# Patient Record
Sex: MALE | Race: WHITE
[De-identification: names, ages, dates, MRNs, and addresses within clinical notes are randomized per-mention and may not be internally consistent; named-entity substitution may affect disease eponyms.]

---

## 2017-01-23 ENCOUNTER — APPOINTMENT (OUTPATIENT)
Dept: UROLOGY | Facility: CLINIC | Age: 82
End: 2017-01-23

## 2017-01-23 VITALS — HEART RATE: 90 BPM | SYSTOLIC BLOOD PRESSURE: 112 MMHG | DIASTOLIC BLOOD PRESSURE: 68 MMHG | TEMPERATURE: 98 F

## 2017-07-17 ENCOUNTER — APPOINTMENT (OUTPATIENT)
Dept: UROLOGY | Facility: CLINIC | Age: 82
End: 2017-07-17

## 2017-07-17 VITALS
TEMPERATURE: 98.2 F | SYSTOLIC BLOOD PRESSURE: 128 MMHG | DIASTOLIC BLOOD PRESSURE: 83 MMHG | WEIGHT: 210 LBS | RESPIRATION RATE: 16 BRPM | OXYGEN SATURATION: 97 % | BODY MASS INDEX: 31.1 KG/M2 | HEART RATE: 63 BPM | HEIGHT: 69 IN

## 2017-07-17 DIAGNOSIS — C67.9 MALIGNANT NEOPLASM OF BLADDER, UNSPECIFIED: ICD-10-CM

## 2017-12-14 ENCOUNTER — INPATIENT (INPATIENT)
Facility: HOSPITAL | Age: 82
LOS: 3 days | Discharge: NURSING, REHAB OR LONG-TERM CARE | End: 2017-12-18
Attending: FAMILY MEDICINE | Admitting: FAMILY MEDICINE
Payer: MEDICARE

## 2017-12-14 VITALS
SYSTOLIC BLOOD PRESSURE: 141 MMHG | DIASTOLIC BLOOD PRESSURE: 75 MMHG | OXYGEN SATURATION: 100 % | HEIGHT: 66 IN | WEIGHT: 229.94 LBS | RESPIRATION RATE: 18 BRPM | HEART RATE: 94 BPM | TEMPERATURE: 98 F

## 2017-12-14 LAB
ALBUMIN SERPL ELPH-MCNC: 3.4 G/DL — SIGNIFICANT CHANGE UP (ref 3.3–5)
ALP SERPL-CCNC: 122 U/L — HIGH (ref 40–120)
ALT FLD-CCNC: 25 U/L — SIGNIFICANT CHANGE UP (ref 12–78)
ANION GAP SERPL CALC-SCNC: 6 MMOL/L — SIGNIFICANT CHANGE UP (ref 5–17)
APPEARANCE UR: CLEAR — SIGNIFICANT CHANGE UP
AST SERPL-CCNC: 21 U/L — SIGNIFICANT CHANGE UP (ref 15–37)
BACTERIA # UR AUTO: (no result)
BASOPHILS # BLD AUTO: 0 K/UL — SIGNIFICANT CHANGE UP (ref 0–0.2)
BASOPHILS NFR BLD AUTO: 0.5 % — SIGNIFICANT CHANGE UP (ref 0–2)
BILIRUB SERPL-MCNC: 0.8 MG/DL — SIGNIFICANT CHANGE UP (ref 0.2–1.2)
BILIRUB UR-MCNC: NEGATIVE — SIGNIFICANT CHANGE UP
BLD GP AB SCN SERPL QL: SIGNIFICANT CHANGE UP
BUN SERPL-MCNC: 39 MG/DL — HIGH (ref 7–23)
CALCIUM SERPL-MCNC: 9.1 MG/DL — SIGNIFICANT CHANGE UP (ref 8.5–10.1)
CHLORIDE SERPL-SCNC: 104 MMOL/L — SIGNIFICANT CHANGE UP (ref 96–108)
CO2 SERPL-SCNC: 32 MMOL/L — HIGH (ref 22–31)
COLOR SPEC: YELLOW — SIGNIFICANT CHANGE UP
CREAT SERPL-MCNC: 2.1 MG/DL — HIGH (ref 0.5–1.3)
DIFF PNL FLD: NEGATIVE — SIGNIFICANT CHANGE UP
EOSINOPHIL # BLD AUTO: 0.1 K/UL — SIGNIFICANT CHANGE UP (ref 0–0.5)
EOSINOPHIL NFR BLD AUTO: 1.1 % — SIGNIFICANT CHANGE UP (ref 0–6)
GLUCOSE SERPL-MCNC: 113 MG/DL — HIGH (ref 70–99)
GLUCOSE UR QL: NEGATIVE MG/DL — SIGNIFICANT CHANGE UP
HCT VFR BLD CALC: 39.8 % — SIGNIFICANT CHANGE UP (ref 39–50)
HGB BLD-MCNC: 12.5 G/DL — LOW (ref 13–17)
INR BLD: 2.69 RATIO — HIGH (ref 0.88–1.16)
KETONES UR-MCNC: NEGATIVE — SIGNIFICANT CHANGE UP
LEUKOCYTE ESTERASE UR-ACNC: (no result)
LYMPHOCYTES # BLD AUTO: 1 K/UL — SIGNIFICANT CHANGE UP (ref 1–3.3)
LYMPHOCYTES # BLD AUTO: 12.9 % — LOW (ref 13–44)
MCHC RBC-ENTMCNC: 30 PG — SIGNIFICANT CHANGE UP (ref 27–34)
MCHC RBC-ENTMCNC: 31.4 GM/DL — LOW (ref 32–36)
MCV RBC AUTO: 95.3 FL — SIGNIFICANT CHANGE UP (ref 80–100)
MONOCYTES # BLD AUTO: 0.6 K/UL — SIGNIFICANT CHANGE UP (ref 0–0.9)
MONOCYTES NFR BLD AUTO: 7.3 % — SIGNIFICANT CHANGE UP (ref 2–14)
NEUTROPHILS # BLD AUTO: 5.9 K/UL — SIGNIFICANT CHANGE UP (ref 1.8–7.4)
NEUTROPHILS NFR BLD AUTO: 78.3 % — HIGH (ref 43–77)
NITRITE UR-MCNC: NEGATIVE — SIGNIFICANT CHANGE UP
PH UR: 6 — SIGNIFICANT CHANGE UP (ref 5–8)
PLATELET # BLD AUTO: 156 K/UL — SIGNIFICANT CHANGE UP (ref 150–400)
POTASSIUM SERPL-MCNC: 4.5 MMOL/L — SIGNIFICANT CHANGE UP (ref 3.5–5.3)
POTASSIUM SERPL-SCNC: 4.5 MMOL/L — SIGNIFICANT CHANGE UP (ref 3.5–5.3)
PROT SERPL-MCNC: 6.7 GM/DL — SIGNIFICANT CHANGE UP (ref 6–8.3)
PROT UR-MCNC: NEGATIVE MG/DL — SIGNIFICANT CHANGE UP
PROTHROM AB SERPL-ACNC: 29.6 SEC — HIGH (ref 9.8–12.7)
RBC # BLD: 4.17 M/UL — LOW (ref 4.2–5.8)
RBC # FLD: 16.6 % — HIGH (ref 10.3–14.5)
RBC CASTS # UR COMP ASSIST: SIGNIFICANT CHANGE UP /HPF (ref 0–4)
SODIUM SERPL-SCNC: 142 MMOL/L — SIGNIFICANT CHANGE UP (ref 135–145)
SP GR SPEC: 1.01 — SIGNIFICANT CHANGE UP (ref 1.01–1.02)
TYPE + AB SCN PNL BLD: SIGNIFICANT CHANGE UP
UROBILINOGEN FLD QL: NEGATIVE MG/DL — SIGNIFICANT CHANGE UP
WBC # BLD: 7.6 K/UL — SIGNIFICANT CHANGE UP (ref 3.8–10.5)
WBC # FLD AUTO: 7.6 K/UL — SIGNIFICANT CHANGE UP (ref 3.8–10.5)
WBC UR QL: SIGNIFICANT CHANGE UP

## 2017-12-14 PROCEDURE — 73502 X-RAY EXAM HIP UNI 2-3 VIEWS: CPT | Mod: 26

## 2017-12-14 PROCEDURE — 73552 X-RAY EXAM OF FEMUR 2/>: CPT | Mod: 26

## 2017-12-14 PROCEDURE — 71010: CPT | Mod: 26

## 2017-12-14 PROCEDURE — 76377 3D RENDER W/INTRP POSTPROCES: CPT | Mod: 26

## 2017-12-14 PROCEDURE — 99285 EMERGENCY DEPT VISIT HI MDM: CPT

## 2017-12-14 PROCEDURE — 73700 CT LOWER EXTREMITY W/O DYE: CPT | Mod: 26,LT

## 2017-12-14 PROCEDURE — 73030 X-RAY EXAM OF SHOULDER: CPT | Mod: 26,LT

## 2017-12-14 PROCEDURE — 93010 ELECTROCARDIOGRAM REPORT: CPT

## 2017-12-14 PROCEDURE — 73562 X-RAY EXAM OF KNEE 3: CPT | Mod: 26,LT

## 2017-12-14 PROCEDURE — 70450 CT HEAD/BRAIN W/O DYE: CPT | Mod: 26

## 2017-12-14 RX ORDER — SODIUM CHLORIDE 9 MG/ML
1000 INJECTION INTRAMUSCULAR; INTRAVENOUS; SUBCUTANEOUS ONCE
Qty: 0 | Refills: 0 | Status: DISCONTINUED | OUTPATIENT
Start: 2017-12-14 | End: 2017-12-14

## 2017-12-14 RX ORDER — SODIUM CHLORIDE 9 MG/ML
500 INJECTION INTRAMUSCULAR; INTRAVENOUS; SUBCUTANEOUS ONCE
Qty: 0 | Refills: 0 | Status: COMPLETED | OUTPATIENT
Start: 2017-12-14 | End: 2017-12-14

## 2017-12-14 RX ORDER — MORPHINE SULFATE 50 MG/1
4 CAPSULE, EXTENDED RELEASE ORAL ONCE
Qty: 0 | Refills: 0 | Status: DISCONTINUED | OUTPATIENT
Start: 2017-12-14 | End: 2017-12-14

## 2017-12-14 RX ADMIN — SODIUM CHLORIDE 500 MILLILITER(S): 9 INJECTION INTRAMUSCULAR; INTRAVENOUS; SUBCUTANEOUS at 20:53

## 2017-12-14 RX ADMIN — MORPHINE SULFATE 4 MILLIGRAM(S): 50 CAPSULE, EXTENDED RELEASE ORAL at 22:45

## 2017-12-14 RX ADMIN — MORPHINE SULFATE 4 MILLIGRAM(S): 50 CAPSULE, EXTENDED RELEASE ORAL at 21:55

## 2017-12-14 NOTE — ED ADULT NURSE NOTE - OBJECTIVE STATEMENT
pt fell of bed this morning at 1:30 AM c/o of L hip pain. on assessment, L leg deformity noted. pt fell of bed this morning at 1:30 AM c/o of L hip pain. on assessment, L leg deformity noted. hx of b/l hip surgeries. denies LOC, hitting head.

## 2017-12-14 NOTE — ED PROVIDER NOTE - MEDICAL DECISION MAKING DETAILS
Head CT WNL.  Xray hip with ? periprosthetic fracture.  CT confirms nondisplaced fx.  Orthopedics consulted will follow patient as inpatient, poss surgery.  Pt now starting to complain of back back, localized mid thoracic on reexam.  Denies chest, flank, or abdominal pain.  XRs of back appear unremarkable, will scan rest of body to r/o occult fracture or other traumatic injury prior to admission given change in his exam on my reeval.  Signout to Dr. Leigh @  01:30.  Will follow up on scans and admit patient. Head CT WNL.  Xray hip with ? periprosthetic fracture.  CT confirms nondisplaced fx.  Orthopedics consulted will follow patient as inpatient, poss surgery.  Pt now starting to complain of back pain, localized mid thoracic on reexam.  Denies chest, flank, or abdominal pain.  XRs of back appear unremarkable, will scan rest of body to r/o occult fracture or other traumatic injury prior to admission given change in his exam on my reeval.  Signout to Dr. Leigh @  01:30.  Will follow up on scans and admit patient.

## 2017-12-14 NOTE — ED PROVIDER NOTE - OBJECTIVE STATEMENT
88 y/o male with PMHx of pacemaker, bilateral hip replacements presents to the ED c/o L hip pain s/p fall overnight at 1:30am. Pt states that he had been on the edge of the bed to get up to go to the bathroom and he slid downward on the carpet. Notes a prior fall a few months ago, head CT was negative. Denies head trauma, LOC. On Coumadin. Pt ambulates with walker at bedside. 86 y/o male with PMHx of pacemaker, bilateral hip replacements presents to the ED c/o L hip pain s/p fall overnight at 1:30am. Pt states that he had been on the edge of the bed to get up to go to the bathroom and he slid downward on the carpet. Notes a prior fall a few months ago, head CT was negative. Denies head trauma, LOC, or any other traumatic injuries or pain elsewhere. On Coumadin. Pt ambulates with walker at bedside.

## 2017-12-15 DIAGNOSIS — Z96.643 PRESENCE OF ARTIFICIAL HIP JOINT, BILATERAL: Chronic | ICD-10-CM

## 2017-12-15 DIAGNOSIS — Z90.49 ACQUIRED ABSENCE OF OTHER SPECIFIED PARTS OF DIGESTIVE TRACT: Chronic | ICD-10-CM

## 2017-12-15 LAB
ANION GAP SERPL CALC-SCNC: 5 MMOL/L — SIGNIFICANT CHANGE UP (ref 5–17)
BASOPHILS # BLD AUTO: 0 K/UL — SIGNIFICANT CHANGE UP (ref 0–0.2)
BASOPHILS NFR BLD AUTO: 0.5 % — SIGNIFICANT CHANGE UP (ref 0–2)
BUN SERPL-MCNC: 35 MG/DL — HIGH (ref 7–23)
CALCIUM SERPL-MCNC: 9.1 MG/DL — SIGNIFICANT CHANGE UP (ref 8.5–10.1)
CHLORIDE SERPL-SCNC: 106 MMOL/L — SIGNIFICANT CHANGE UP (ref 96–108)
CO2 SERPL-SCNC: 33 MMOL/L — HIGH (ref 22–31)
CREAT SERPL-MCNC: 1.91 MG/DL — HIGH (ref 0.5–1.3)
EOSINOPHIL # BLD AUTO: 0 K/UL — SIGNIFICANT CHANGE UP (ref 0–0.5)
EOSINOPHIL NFR BLD AUTO: 0.5 % — SIGNIFICANT CHANGE UP (ref 0–6)
GLUCOSE SERPL-MCNC: 103 MG/DL — HIGH (ref 70–99)
HCT VFR BLD CALC: 42 % — SIGNIFICANT CHANGE UP (ref 39–50)
HGB BLD-MCNC: 13.3 G/DL — SIGNIFICANT CHANGE UP (ref 13–17)
INR BLD: 2.53 RATIO — HIGH (ref 0.88–1.16)
LYMPHOCYTES # BLD AUTO: 0.9 K/UL — LOW (ref 1–3.3)
LYMPHOCYTES # BLD AUTO: 10 % — LOW (ref 13–44)
MCHC RBC-ENTMCNC: 30 PG — SIGNIFICANT CHANGE UP (ref 27–34)
MCHC RBC-ENTMCNC: 31.6 GM/DL — LOW (ref 32–36)
MCV RBC AUTO: 94.9 FL — SIGNIFICANT CHANGE UP (ref 80–100)
MONOCYTES # BLD AUTO: 0.6 K/UL — SIGNIFICANT CHANGE UP (ref 0–0.9)
MONOCYTES NFR BLD AUTO: 6.6 % — SIGNIFICANT CHANGE UP (ref 2–14)
NEUTROPHILS # BLD AUTO: 7.2 K/UL — SIGNIFICANT CHANGE UP (ref 1.8–7.4)
NEUTROPHILS NFR BLD AUTO: 82.4 % — HIGH (ref 43–77)
PLATELET # BLD AUTO: 138 K/UL — LOW (ref 150–400)
POTASSIUM SERPL-MCNC: 4.2 MMOL/L — SIGNIFICANT CHANGE UP (ref 3.5–5.3)
POTASSIUM SERPL-SCNC: 4.2 MMOL/L — SIGNIFICANT CHANGE UP (ref 3.5–5.3)
PROTHROM AB SERPL-ACNC: 27.9 SEC — HIGH (ref 9.8–12.7)
RBC # BLD: 4.43 M/UL — SIGNIFICANT CHANGE UP (ref 4.2–5.8)
RBC # FLD: 16.5 % — HIGH (ref 10.3–14.5)
SODIUM SERPL-SCNC: 144 MMOL/L — SIGNIFICANT CHANGE UP (ref 135–145)
WBC # BLD: 8.7 K/UL — SIGNIFICANT CHANGE UP (ref 3.8–10.5)
WBC # FLD AUTO: 8.7 K/UL — SIGNIFICANT CHANGE UP (ref 3.8–10.5)

## 2017-12-15 PROCEDURE — 74176 CT ABD & PELVIS W/O CONTRAST: CPT | Mod: 26

## 2017-12-15 PROCEDURE — 73502 X-RAY EXAM HIP UNI 2-3 VIEWS: CPT | Mod: 26

## 2017-12-15 PROCEDURE — 72100 X-RAY EXAM L-S SPINE 2/3 VWS: CPT | Mod: 26

## 2017-12-15 PROCEDURE — 71250 CT THORAX DX C-: CPT | Mod: 26

## 2017-12-15 PROCEDURE — 72070 X-RAY EXAM THORAC SPINE 2VWS: CPT | Mod: 26

## 2017-12-15 RX ORDER — ACETAMINOPHEN 500 MG
650 TABLET ORAL EVERY 6 HOURS
Qty: 0 | Refills: 0 | Status: DISCONTINUED | OUTPATIENT
Start: 2017-12-15 | End: 2017-12-18

## 2017-12-15 RX ORDER — FOLIC ACID 0.8 MG
1 TABLET ORAL DAILY
Qty: 0 | Refills: 0 | Status: DISCONTINUED | OUTPATIENT
Start: 2017-12-15 | End: 2017-12-18

## 2017-12-15 RX ORDER — MORPHINE SULFATE 50 MG/1
4 CAPSULE, EXTENDED RELEASE ORAL ONCE
Qty: 0 | Refills: 0 | Status: DISCONTINUED | OUTPATIENT
Start: 2017-12-15 | End: 2017-12-15

## 2017-12-15 RX ORDER — AMIODARONE HYDROCHLORIDE 400 MG/1
200 TABLET ORAL DAILY
Qty: 0 | Refills: 0 | Status: DISCONTINUED | OUTPATIENT
Start: 2017-12-15 | End: 2017-12-18

## 2017-12-15 RX ORDER — ATORVASTATIN CALCIUM 80 MG/1
20 TABLET, FILM COATED ORAL AT BEDTIME
Qty: 0 | Refills: 0 | Status: DISCONTINUED | OUTPATIENT
Start: 2017-12-15 | End: 2017-12-18

## 2017-12-15 RX ORDER — WARFARIN SODIUM 2.5 MG/1
2 TABLET ORAL EVERY OTHER DAY
Qty: 0 | Refills: 0 | Status: DISCONTINUED | OUTPATIENT
Start: 2017-12-16 | End: 2017-12-18

## 2017-12-15 RX ORDER — WARFARIN SODIUM 2.5 MG/1
1 TABLET ORAL
Qty: 0 | Refills: 0 | COMMUNITY

## 2017-12-15 RX ORDER — WARFARIN SODIUM 2.5 MG/1
1 TABLET ORAL EVERY OTHER DAY
Qty: 0 | Refills: 0 | Status: COMPLETED | OUTPATIENT
Start: 2017-12-15 | End: 2017-12-17

## 2017-12-15 RX ORDER — METOPROLOL TARTRATE 50 MG
25 TABLET ORAL
Qty: 0 | Refills: 0 | Status: DISCONTINUED | OUTPATIENT
Start: 2017-12-15 | End: 2017-12-18

## 2017-12-15 RX ORDER — ALLOPURINOL 300 MG
300 TABLET ORAL DAILY
Qty: 0 | Refills: 0 | Status: DISCONTINUED | OUTPATIENT
Start: 2017-12-15 | End: 2017-12-18

## 2017-12-15 RX ORDER — WARFARIN SODIUM 2.5 MG/1
0 TABLET ORAL
Qty: 0 | Refills: 0 | COMMUNITY

## 2017-12-15 RX ORDER — FUROSEMIDE 40 MG
40 TABLET ORAL
Qty: 0 | Refills: 0 | Status: DISCONTINUED | OUTPATIENT
Start: 2017-12-15 | End: 2017-12-15

## 2017-12-15 RX ORDER — LEVOTHYROXINE SODIUM 125 MCG
100 TABLET ORAL DAILY
Qty: 0 | Refills: 0 | Status: DISCONTINUED | OUTPATIENT
Start: 2017-12-15 | End: 2017-12-18

## 2017-12-15 RX ORDER — WARFARIN SODIUM 2.5 MG/1
1 TABLET ORAL EVERY OTHER DAY
Qty: 0 | Refills: 0 | Status: DISCONTINUED | OUTPATIENT
Start: 2017-12-15 | End: 2017-12-15

## 2017-12-15 RX ADMIN — MORPHINE SULFATE 4 MILLIGRAM(S): 50 CAPSULE, EXTENDED RELEASE ORAL at 01:15

## 2017-12-15 RX ADMIN — MORPHINE SULFATE 4 MILLIGRAM(S): 50 CAPSULE, EXTENDED RELEASE ORAL at 09:28

## 2017-12-15 RX ADMIN — Medication 1 MILLIGRAM(S): at 11:25

## 2017-12-15 RX ADMIN — Medication 650 MILLIGRAM(S): at 11:25

## 2017-12-15 RX ADMIN — ATORVASTATIN CALCIUM 20 MILLIGRAM(S): 80 TABLET, FILM COATED ORAL at 21:58

## 2017-12-15 RX ADMIN — MORPHINE SULFATE 4 MILLIGRAM(S): 50 CAPSULE, EXTENDED RELEASE ORAL at 06:31

## 2017-12-15 RX ADMIN — Medication 25 MILLIGRAM(S): at 17:24

## 2017-12-15 RX ADMIN — MORPHINE SULFATE 4 MILLIGRAM(S): 50 CAPSULE, EXTENDED RELEASE ORAL at 00:35

## 2017-12-15 RX ADMIN — Medication 300 MILLIGRAM(S): at 11:24

## 2017-12-15 RX ADMIN — WARFARIN SODIUM 1 MILLIGRAM(S): 2.5 TABLET ORAL at 21:58

## 2017-12-15 NOTE — H&P ADULT - ASSESSMENT
A/P  # Left hip pain/s/p mechanical fall R/O left hip periprosthetic fx  - admit to med surg  -pain management  -NWB LLE  - ortho consult appreciated   -f/u ortho regarding if patient needs surgical intervention    #Pre op eval  Patient has high  cardiac risk for intermediate risk surgery based on RCRI criteria (CKD -cr >2)  Evaluation of METS limited due to patients chronic gait disorder  if ortho plans for OR ,would consult cardiology for preop clearance  and for PPM interrogation prior to OR    # hx of Afib [id number] 4 with PPM  -INR therapeutic, continue coumadin,continue BB, continue amiodarone ,lipitor  -if plan for OR would need coumadin reversal and cardio consult to recommend need for bridging with Iv heparin     # CKD stage , hx of Left renal cyst  - followed as outpatient with Dr hardin(uro) and nephrology ([facility]-patient does not know name)  -continue allopurinol    #hx Chronic venous insufficiency  continue lasix   # DVT prophylaxis- on coumadin A/P  # Left hip pain/s/p mechanical fall R/O left hip periprosthetic fx  - admit to med surg  -pain management  -NWB LLE  - ortho consult appreciated   -f/u ortho regarding if patient needs surgical intervention  -PT    #Pre op eval  Patient has high  cardiac risk for intermediate risk surgery based on RCRI criteria (CKD -cr >2)  Evaluation of METS limited due to patients chronic gait disorder  if ortho plans for OR and patient agrees ,would consult cardiology for preop clearance  and for PPM interrogation prior to OR    # hx of Afib [id number] 4 with PPM  -INR therapeutic, continue coumadin,continue BB, continue amiodarone ,lipitor  -if plan for OR would need coumadin reversal and cardio consult to recommend need for bridging with Iv heparin     # CKD stage 4 (no prior labs to comapare with) , hx of Left renal cyst  - followed as outpatient with Dr hardin(uro) and nephrology ([facility]-patient does not know name)  -continue allopurinol  monitor BMP s/p IVF received in ED    #hx Chronic venous insufficiency  continue lasix     # DVT prophylaxis- on coumadin A/P  # Left hip pain/s/p mechanical fall R/O left hip periprosthetic fx  - admit to med surg  -pain management  -NWB LLE  - ortho consult appreciated   -f/u ortho regarding if patient needs surgical intervention  -PT    #Pre op eval  Patient has high  cardiac risk for intermediate risk surgery based on RCRI criteria (CKD -cr >2)  Evaluation of METS limited due to patients chronic gait disorder  if ortho plans for OR and patient agrees ,would consult cardiology for preop clearance  and for PPM interrogation prior to OR    # hx of Afib [id number] 4 with PPM  -INR therapeutic, continue coumadin,continue BB, continue amiodarone ,lipitor  -if plan for OR would need coumadin reversal and cardio consult to recommend need for bridging with Iv heparin     # CKD stage 4 vs superimposed FRANCINE (no prior labs to compare with) , hx of Left renal cyst  - followed as outpatient with Dr hardin(uro) and nephrology ([facility]-patient does not know name)  -continue allopurinol  monitor BMP s/p IVF received in ED    #hx Chronic venous insufficiency   ,need to get prior lab record from PMD to compare renal function   if renal function at baseline continue lasix     # DVT prophylaxis- on coumadin

## 2017-12-15 NOTE — PROGRESS NOTE ADULT - ASSESSMENT
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

## 2017-12-15 NOTE — CONSULT NOTE ADULT - SUBJECTIVE AND OBJECTIVE BOX
ortho Paged at [id number], examined patient by [id number]    87y Male ambulates with walker and wheelchair at baseline presents c/o L hip pain s/p fall and slide out of bed.  Patient states he felt thigh pain and slid down, was able to ambulate after the fall to the living room.  Since then he has been unable to ambulate, but he can bear weight, but it is painful. Denies HS/LOC. Denies numbness/tingling. Denies fever/chills. Denies pain/injury elsewhere.   Patient has B/L DARIO done over 20 years ago.  He states he has had chronic Left thigh pain for years and has not seen orthopedic surgeon to get evaluation.         PAST MEDICAL & SURGICAL HISTORY:  HTN  Pacemaker  Afib on Coumadin  Venous Stasis  PVD  CAD     Allergies    penicillins (Unknown)    Intolerances  None                            12.5   7.6   )-----------( 156      ( 14 Dec 2017 19:26 )             39.8     14 Dec 2017 19:26    142    |  104    |  39     ----------------------------<  113    4.5     |  32     |  2.10     Ca    9.1        14 Dec 2017 19:26    TPro  6.7    /  Alb  3.4    /  TBili  0.8    /  DBili  x      /  AST  21     /  ALT  25     /  AlkPhos  122    14 Dec 2017 19:26    PT/INR - ( 14 Dec 2017 19:26 )   PT: 29.6 sec;   INR: 2.69 ratio           Vital Signs Last 24 Hrs  HR: 94 (12-14-17 @ 18:10) (94 - 94)  BP: 141/75 (12-14-17 @ 18:10) (141/75 - 141/75)  RR: 18 (12-14-17 @ 18:10) (18 - 18)  SpO2: 100% (12-14-17 @ 18:10) (100% - 100%)    Imaging: resident report; XR Hip/Pelvis demonstrate B/L DARIO, L DARIO demonstrates chronic appearing subsidence of prosthesis with bone formation along lateral cortex.- official read pending    Physical Exam  General: NAD, Alert, Awake and oriented  Neurologic: No gross deficits, moving all 4s.    LEFT LE:  No open skin. Chronic venous stasis of B/L LE. No deformities or other signs of trauma at hip, knee, lower leg, ankle or foot.  Patient has chronic leg length discrepancy, R>L. TTP along lateral cortex. Full baseline painless Knee, ankle and toes. Moderate pain with passive ROM of hip, pain located to groin and lateral thigh with IR/ER, no pain with log roll.  Patient unable to SLR, but patient able to hold LLE elevated with assistance with minimal pain. SILT toes 1-5.  DP/PT weak but intact with brisk cap refill distally. Compartments soft and compressible. EHL/FHL/TA/GS intact 5/5.    Secondary Survey: No bony TTP along prominences, full painless ROM throughout, able to SLR RLE, SILT, compartments soft, grossly benign.
Patient is a 87y old  Male who presents with a chief complaint of left hip pain s/p fall.      HPI:  86 y/o male with PMHx of pacemaker,Afib , hx of renal cyst follows with dr hardin outpatient and abnormal kidney function follows with outpatient nephrology ,bilateral hip replacements presents to the ED c/o L hip pain s/p fall overnight at 1:30am 17. Pt states that he had been on the edge of the bed to get up to go to the bathroom and he slid downward on the carpet. Notes a prior fall a few months ago, head CT was negative. Denies head trauma, LOC, or any other traumatic injuries or pain elsewhere. On Coumadin.   Pt ambulates with walker at home.  Pt was noted to have a prosthetic hip fracture on left side and cardiology consulted for preop evaluation.  Pt denies any CP or SOB or synocpe.      pmhx/pshx- afib/PPM on coumadin, chronic venous insufficiency s/p right lower extremity vein surgery3 years ago, HTN,hypothyroidism, cholecystectomy ,B/l hip replacements,hyperlipidemia, ? CKD, hx of renal cyst per patient followed every 3 months by dr hardin   social- quit smoking 10 yrs ago, occasional etoh use  ,walks mainly with walker and sometimes uses wheel chair  family hx- non contributory (15 Dec 2017 06:27)      PAST MEDICAL & SURGICAL HISTORY:  PVD (peripheral vascular disease)      MEDICATIONS  (STANDING):  allopurinol 300 milliGRAM(s) Oral daily  amiodarone    Tablet 200 milliGRAM(s) Oral daily  atorvastatin 20 milliGRAM(s) Oral at bedtime  folic acid 1 milliGRAM(s) Oral daily  levothyroxine 100 MICROGram(s) Oral daily  metoprolol     tartrate 25 milliGRAM(s) Oral two times a day  warfarin 1 milliGRAM(s) Oral every other day    MEDICATIONS  (PRN):  acetaminophen   Tablet 650 milliGRAM(s) Oral every 6 hours PRN moderate pain      FAMILY HISTORY:  No family history of premature CAD or SCD.       SOCIAL HISTORY:  non smoker, no alcohol use     REVIEW OF SYSTEMS:  CONSTITUTIONAL:  No night sweats.  No fatigue, malaise, lethargy.  No fever or chills.  HEENT:  Eyes:  No visual changes.  No eye pain.      ENT:  No runny nose.  No epistaxis.  No sinus pain.  No sore throat.  No odynophagia.  No ear pain.  No congestion.  RESPIRATORY:  No cough.  No wheeze.  No hemoptysis.  No shortness of breath.  CARDIOVASCULAR:  No chest pains.  No palpitations. No shortness of breath, No orthopnea or PND.  GASTROINTESTINAL:  No abdominal pain.  No nausea or vomiting.  No diarrhea or constipation.  No hematemesis.  No hematochezia.  No melena.  GENITOURINARY:  No urgency.  No frequency.  No dysuria.  No hematuria.  No obstructive symptoms.  No discharge.  No pain.  No significant abnormal bleeding.  MUSCULOSKELETAL:  L hip pain  NEUROLOGICAL:  No tingling or numbness or weakness.  PSYCHIATRIC:  No confusion  SKIN:  No rashes.  No lesions.  No wounds.  ENDOCRINE:  No unexplained weight loss.  No polydipsia.  No polyuria.  No polyphagia.  HEMATOLOGIC:  No anemia.  No purpura.  No petechiae.  No prolonged or excessive bleeding.   ALLERGIC AND IMMUNOLOGIC:  No pruritus.  No swelling.         Vital Signs Last 24 Hrs  T(C): 36.6 (15 Dec 2017 09:16), Max: 36.8 (15 Dec 2017 08:29)  T(F): 97.8 (15 Dec 2017 09:16), Max: 98.3 (15 Dec 2017 08:29)  HR: 86 (15 Dec 2017 09:16) (62 - 94)  BP: 120/60 (15 Dec 2017 09:16) (107/64 - 151/69)  BP(mean): --  RR: 15 (15 Dec 2017 09:16) (15 - 18)  SpO2: 100% (15 Dec 2017 09:16) (97% - 100%)    PHYSICAL EXAM-    Constitutional: The patient appears to be normal, well developed, well nourished and alert and oriented to time, place and person. The patient does not appear acutely ill.     Head: Head is normocephalic and atraumatic.      Neck: The patient's neck is supple without enlargement, has no palpable thyromegaly nor thyroid nodules and has no jugular venous distention. No audible carotid bruits. There are strong carotid pulses bilaterally. No JVD.     Cardiovascular: Regular rate and rhythm without S3, S4. No murmurs or rubs are appreciated.      Respiratory: Breathsounds are normal. No rales. No wheezing.    Abdomen: Soft, nontender, nondistended with positive bowel sounds.      Extremity: No tenderness. No  pitting edema No skin discoloration No clubbing No cyanosis.     Neurologic: The patient is alert and oriented.      Skin: No rash, no obvious lesions noted.      Psychiatric: The patient appears to be emotionally stable.      INTERPRETATION OF TELEMETRY: not on     ECG: A paced rythm    I&O's Detail    14 Dec 2017 07:01  -  15 Dec 2017 07:00  --------------------------------------------------------  IN:  Total IN: 0 mL    OUT:    Voided: 750 mL  Total OUT: 750 mL    Total NET: -750 mL      15 Dec 2017 07:01  -  15 Dec 2017 15:30  --------------------------------------------------------  IN:    Oral Fluid: 240 mL  Total IN: 240 mL    OUT:    Voided: 200 mL  Total OUT: 200 mL    Total NET: 40 mL          LABS:                        13.3   8.7   )-----------( 138      ( 15 Dec 2017 12:45 )             42.0     1215    144  |  106  |  35<H>  ----------------------------<  103<H>  4.2   |  33<H>  |  1.91<H>    Ca    9.1      15 Dec 2017 12:45    TPro  6.7  /  Alb  3.4  /  TBili  0.8  /  DBili  x   /  AST  21  /  ALT  25  /  AlkPhos  122<H>  12-14        PT/INR - ( 15 Dec 2017 12:45 )   PT: 27.9 sec;   INR: 2.53 ratio           Urinalysis Basic - ( 14 Dec 2017 20:10 )    Color: Yellow / Appearance: Clear / S.010 / pH: x  Gluc: x / Ketone: Negative  / Bili: Negative / Urobili: Negative mg/dL   Blood: x / Protein: Negative mg/dL / Nitrite: Negative   Leuk Esterase: Trace / RBC: 0-2 /HPF / WBC 0-2   Sq Epi: x / Non Sq Epi: x / Bacteria: Occasional      I&O's Summary    14 Dec 2017 07:01  -  15 Dec 2017 07:00  --------------------------------------------------------  IN: 0 mL / OUT: 750 mL / NET: -750 mL    15 Dec 2017 07:  -  15 Dec 2017 15:30  --------------------------------------------------------  IN: 240 mL / OUT: 200 mL / NET: 40 mL      BNP  RADIOLOGY & ADDITIONAL STUDIES:  < from: CT Lower Extremity No Cont, Left (12.14.17 @ 22:56) >    EXAM:  CT LWR EXT LT                          [facility]                            PROCEDURE DATE:  2017          INTERPRETATION:  Clinical indication: Rule out left hip periprosthetic   fracture..    Technique: CT axial images of the left hip and femur were obtained   without intravenous contrast. Coronal and sagittal reformatted images   were also obtained. 3-D volume rendering images were obtained from a   separate workstation  .    Comparison: Earlier radiographs of the same date.     Findings:    Bones: Severe metallic artifact limits the evaluation around the hip   joint. Osteopenia. There is separation of the metallic and nonmetallic   components of the femoral stem causing endosteal erosion to the femur   medially and laterally (604-72, 3-109). There is outward buckling of the   overlying cortex but there is no acute definite cortical disruption this   level. About 4 cm above this cortical buckling, there is a thin cortical   disruption only seenon the coronal plane. Volume rendering images   demonstrate an oblique subtle fracture (500-6 through 13) .  Acetabular   and proximal femoral component are unremarkable.    Soft tissue: No hematoma in the visualized soft tissue. No significant   hipjoint effusion. Atrophic left femur and hip musculature..        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    Nondisplaced hairline fracture of the proximal femur 4 cm above the   cortical buckling.    < end of copied text >

## 2017-12-15 NOTE — H&P ADULT - NSHPLABSRESULTS_GEN_ALL_CORE
12.5   7.6   )-----------( 156      ( 14 Dec 2017 19:26 )             39.8       CBC Full  -  ( 14 Dec 2017 19:26 )  WBC Count : 7.6 K/uL  Hemoglobin : 12.5 g/dL  Hematocrit : 39.8 %  Platelet Count - Automated : 156 K/uL  Mean Cell Volume : 95.3 fl  Mean Cell Hemoglobin : 30.0 pg  Mean Cell Hemoglobin Concentration : 31.4 gm/dL  Auto Neutrophil # : 5.9 K/uL  Auto Lymphocyte # : 1.0 K/uL  Auto Monocyte # : 0.6 K/uL  Auto Eosinophil # : 0.1 K/uL  Auto Basophil # : 0.0 K/uL  Auto Neutrophil % : 78.3 %  Auto Lymphocyte % : 12.9 %  Auto Monocyte % : 7.3 %  Auto Eosinophil % : 1.1 %  Auto Basophil % : 0.5 %          142  |  104  |  39<H>  ----------------------------<  113<H>  4.5   |  32<H>  |  2.10<H>    Ca    9.1      14 Dec 2017 19:26    TPro  6.7  /  Alb  3.4  /  TBili  0.8  /  DBili  x   /  AST  21  /  ALT  25  /  AlkPhos  122<H>        LIVER FUNCTIONS - ( 14 Dec 2017 19:26 )  Alb: 3.4 g/dL / Pro: 6.7 gm/dL / ALK PHOS: 122 U/L / ALT: 25 U/L / AST: 21 U/L / GGT: x             PT/INR - ( 14 Dec 2017 19:26 )   PT: 29.6 sec;   INR: 2.69 ratio                   Urinalysis Basic - ( 14 Dec 2017 20:10 )    Color: Yellow / Appearance: Clear / S.010 / pH: x  Gluc: x / Ketone: Negative  / Bili: Negative / Urobili: Negative mg/dL   Blood: x / Protein: Negative mg/dL / Nitrite: Negative   Leuk Esterase: Trace / RBC: 0-2 /HPF / WBC 0-2   Sq Epi: x / Non Sq Epi: x / Bacteria: Occasional            MEDICATIONS  (STANDING):  allopurinol 300 milliGRAM(s) Oral daily  amiodarone    Tablet 200 milliGRAM(s) Oral daily  atorvastatin 20 milliGRAM(s) Oral at bedtime  folic acid 1 milliGRAM(s) Oral daily  furosemide    Tablet 40 milliGRAM(s) Oral two times a day  levothyroxine 100 MICROGram(s) Oral daily  metoprolol     tartrate 25 milliGRAM(s) Oral two times a day

## 2017-12-15 NOTE — CHART NOTE - NSCHARTNOTEFT_GEN_A_CORE
discussed case further with Dr. cleary  considering operative mgmt for periprosthetic hip fx  will discuss with patient and family further  possible OR tomorrow pending medical optimization/clearance  npo except meds after midnight  ic fluids while npo  hold chemical AC after midnight  will advise if plan changes further

## 2017-12-15 NOTE — H&P ADULT - NSHPPHYSICALEXAM_GEN_ALL_CORE
PHYSICAL EXAM:    Daily Height in cm: 167.64 (14 Dec 2017 18:10)    Daily     ICU Vital Signs Last 24 Hrs  T(C): 36.4 (15 Dec 2017 05:07), Max: 36.7 (14 Dec 2017 18:10)  T(F): 97.6 (15 Dec 2017 05:07), Max: 98.1 (14 Dec 2017 18:10)  HR: 64 (15 Dec 2017 05:07) (62 - 94)  BP: 133/92 (15 Dec 2017 05:07) (107/64 - 141/75)  BP(mean): --  ABP: --  ABP(mean): --  RR: 17 (15 Dec 2017 05:07) (17 - 18)  SpO2: 100% (15 Dec 2017 05:07) (100% - 100%)      Constitutional: NAD,pleasant,  HEENT: Atraumatic, PAT, Normal, No congestion  Respiratory: Breath Sounds normal, no rhonchi/wheeze  Cardiovascular: N S1S2; LAUREN present  Gastrointestinal: Abdomen soft, non tender, obese Bowel Ssounds present  Extremities: B/l lower ext edema 2+,   Neurological: awake ,alert oriented x3, no gross focal motor deficits except chronic RLE weakness (unable to passively elevate RLE above bed  which is chronic as per patient), left hip tenderness ,no edema, unable to move left hip due to pain on movement  Skin: Non cellulitic,   Lymph Nodes: No lymphadenopathy noted  Back: No CVA tenderness   Musculoskeletal: non tender  Breasts: Deferred  Genitourinary: deferred  Rectal: Deferred

## 2017-12-15 NOTE — H&P ADULT - HISTORY OF PRESENT ILLNESS
88 y/o male with PMHx of pacemaker,Afib , hx of renal cyst follows with dr hardin outpatient and abnormal kidney function follows with outpatient nephrology ,bilateral hip replacements presents to the ED c/o L hip pain s/p fall overnight at 1:30am 12/14/17. Pt states that he had been on the edge of the bed to get up to go to the bathroom and he slid downward on the carpet. Notes a prior fall a few months ago, head CT was negative. Denies head trauma, LOC, or any other traumatic injuries or pain elsewhere. On Coumadin. Pt ambulates with walker at home  trauma w/u done in ED head CT,Ct chest and Ct abd/pelvis-large left kidney cyst exophytic renal cyst vs retroperitoneal ,sigmoid diverticulosis without diverticulitis,left apical scarring/fibrosis  patient seen by ortho in Ed- possibility of left hip periprosthetic fx   INR 2.69 on coumadin  EKG paced rhythm     pmhx/pshx- afib/PPM on coumadin, chronic venous insufficiency s/p right lower extremity vein surgery3 years ago, HTN,hypothyroidism, cholecystectomy ,B/l hip replacements,hyperlipidemia, ? CKD, hx of renal cyst per patient followed every 3 months by dr hardin   social- quit smoking 10 yrs ago, occasional etoh use  ,walks mainly with walker and sometimes uses wheel chair  family hx- non contributory

## 2017-12-15 NOTE — PROGRESS NOTE ADULT - SUBJECTIVE AND OBJECTIVE BOX
Pt seen and examined. Pain controlled. No acute events overnight.  Denies any Fever/CHills/CP/SOB.  Patient complains of mild R hip pain this AM    Vital Signs Last 24 Hrs  T(C): 36.7 (14 Dec 2017 18:10), Max: 36.7 (14 Dec 2017 18:10)  T(F): 98.1 (14 Dec 2017 18:10), Max: 98.1 (14 Dec 2017 18:10)  HR: 64 (15 Dec 2017 05:07) (62 - 94)  BP: 133/92 (15 Dec 2017 05:07) (107/64 - 141/75)  RR: 18 (14 Dec 2017 18:10) (18 - 18)  SpO2: 100% (14 Dec 2017 18:10) (100% - 100%)    Gen: NAD  LLE:  Skin intact  Pain with ROM of L hip, unable to SLR L hip, Positive log roll L hip  +sensation L2-S1  +dorsiflexion/plantarflexion of ankle/hallux  +dorsalis pedis pulse, weak but palpable   Soft compartments, - calf tenderness Pt seen and examined. Pain controlled. No acute events overnight.  Denies any Fever/CHills/CP/SOB.  Patient complains of mild R hip pain this AM    Vital Signs Last 24 Hrs  T(C): 36.7 (14 Dec 2017 18:10), Max: 36.7 (14 Dec 2017 18:10)  T(F): 98.1 (14 Dec 2017 18:10), Max: 98.1 (14 Dec 2017 18:10)  HR: 64 (15 Dec 2017 05:07) (62 - 94)  BP: 133/92 (15 Dec 2017 05:07) (107/64 - 141/75)  RR: 18 (14 Dec 2017 18:10) (18 - 18)  SpO2: 100% (14 Dec 2017 18:10) (100% - 100%)    Gen: NAD  LLE:  Skin intact  Pain with ROM of L hip, unable to SLR L hip, neg log roll L hip  +sensation L2-S1  +dorsiflexion/plantarflexion of ankle/hallux  +dorsalis pedis pulse, weak but palpable   Soft compartments, - calf tenderness

## 2017-12-16 LAB
ALBUMIN SERPL ELPH-MCNC: 3 G/DL — LOW (ref 3.3–5)
ALP SERPL-CCNC: 225 U/L — HIGH (ref 40–120)
ALT FLD-CCNC: 219 U/L — HIGH (ref 12–78)
ANION GAP SERPL CALC-SCNC: 8 MMOL/L — SIGNIFICANT CHANGE UP (ref 5–17)
APTT BLD: 44.9 SEC — HIGH (ref 27.5–37.4)
AST SERPL-CCNC: 160 U/L — HIGH (ref 15–37)
BASOPHILS # BLD AUTO: 0 K/UL — SIGNIFICANT CHANGE UP (ref 0–0.2)
BASOPHILS NFR BLD AUTO: 0.7 % — SIGNIFICANT CHANGE UP (ref 0–2)
BILIRUB SERPL-MCNC: 0.9 MG/DL — SIGNIFICANT CHANGE UP (ref 0.2–1.2)
BUN SERPL-MCNC: 35 MG/DL — HIGH (ref 7–23)
CALCIUM SERPL-MCNC: 8.9 MG/DL — SIGNIFICANT CHANGE UP (ref 8.5–10.1)
CHLORIDE SERPL-SCNC: 106 MMOL/L — SIGNIFICANT CHANGE UP (ref 96–108)
CO2 SERPL-SCNC: 30 MMOL/L — SIGNIFICANT CHANGE UP (ref 22–31)
CREAT SERPL-MCNC: 1.77 MG/DL — HIGH (ref 0.5–1.3)
EOSINOPHIL # BLD AUTO: 0.2 K/UL — SIGNIFICANT CHANGE UP (ref 0–0.5)
EOSINOPHIL NFR BLD AUTO: 2.6 % — SIGNIFICANT CHANGE UP (ref 0–6)
GLUCOSE SERPL-MCNC: 88 MG/DL — SIGNIFICANT CHANGE UP (ref 70–99)
HCT VFR BLD CALC: 38.8 % — LOW (ref 39–50)
HGB BLD-MCNC: 12.2 G/DL — LOW (ref 13–17)
INR BLD: 2.93 RATIO — HIGH (ref 0.88–1.16)
LYMPHOCYTES # BLD AUTO: 1.1 K/UL — SIGNIFICANT CHANGE UP (ref 1–3.3)
LYMPHOCYTES # BLD AUTO: 15.1 % — SIGNIFICANT CHANGE UP (ref 13–44)
MAGNESIUM SERPL-MCNC: 2.2 MG/DL — SIGNIFICANT CHANGE UP (ref 1.6–2.6)
MCHC RBC-ENTMCNC: 30 PG — SIGNIFICANT CHANGE UP (ref 27–34)
MCHC RBC-ENTMCNC: 31.4 GM/DL — LOW (ref 32–36)
MCV RBC AUTO: 95.6 FL — SIGNIFICANT CHANGE UP (ref 80–100)
MONOCYTES # BLD AUTO: 0.5 K/UL — SIGNIFICANT CHANGE UP (ref 0–0.9)
MONOCYTES NFR BLD AUTO: 7.5 % — SIGNIFICANT CHANGE UP (ref 2–14)
NEUTROPHILS # BLD AUTO: 5.3 K/UL — SIGNIFICANT CHANGE UP (ref 1.8–7.4)
NEUTROPHILS NFR BLD AUTO: 74.1 % — SIGNIFICANT CHANGE UP (ref 43–77)
PHOSPHATE SERPL-MCNC: 2.5 MG/DL — SIGNIFICANT CHANGE UP (ref 2.5–4.5)
PLATELET # BLD AUTO: 149 K/UL — LOW (ref 150–400)
POTASSIUM SERPL-MCNC: 3.8 MMOL/L — SIGNIFICANT CHANGE UP (ref 3.5–5.3)
POTASSIUM SERPL-SCNC: 3.8 MMOL/L — SIGNIFICANT CHANGE UP (ref 3.5–5.3)
PROT SERPL-MCNC: 6.3 GM/DL — SIGNIFICANT CHANGE UP (ref 6–8.3)
PROTHROM AB SERPL-ACNC: 32.4 SEC — HIGH (ref 9.8–12.7)
RBC # BLD: 4.06 M/UL — LOW (ref 4.2–5.8)
RBC # FLD: 16.3 % — HIGH (ref 10.3–14.5)
SODIUM SERPL-SCNC: 144 MMOL/L — SIGNIFICANT CHANGE UP (ref 135–145)
WBC # BLD: 7.2 K/UL — SIGNIFICANT CHANGE UP (ref 3.8–10.5)
WBC # FLD AUTO: 7.2 K/UL — SIGNIFICANT CHANGE UP (ref 3.8–10.5)

## 2017-12-16 RX ORDER — DOCUSATE SODIUM 100 MG
100 CAPSULE ORAL THREE TIMES A DAY
Qty: 0 | Refills: 0 | Status: DISCONTINUED | OUTPATIENT
Start: 2017-12-16 | End: 2017-12-18

## 2017-12-16 RX ORDER — FUROSEMIDE 40 MG
40 TABLET ORAL
Qty: 0 | Refills: 0 | Status: DISCONTINUED | OUTPATIENT
Start: 2017-12-16 | End: 2017-12-18

## 2017-12-16 RX ORDER — POLYETHYLENE GLYCOL 3350 17 G/17G
17 POWDER, FOR SOLUTION ORAL DAILY
Qty: 0 | Refills: 0 | Status: DISCONTINUED | OUTPATIENT
Start: 2017-12-16 | End: 2017-12-18

## 2017-12-16 RX ORDER — SENNA PLUS 8.6 MG/1
2 TABLET ORAL AT BEDTIME
Qty: 0 | Refills: 0 | Status: DISCONTINUED | OUTPATIENT
Start: 2017-12-16 | End: 2017-12-18

## 2017-12-16 RX ORDER — POTASSIUM CHLORIDE 20 MEQ
10 PACKET (EA) ORAL
Qty: 0 | Refills: 0 | Status: DISCONTINUED | OUTPATIENT
Start: 2017-12-16 | End: 2017-12-18

## 2017-12-16 RX ADMIN — AMIODARONE HYDROCHLORIDE 200 MILLIGRAM(S): 400 TABLET ORAL at 05:41

## 2017-12-16 RX ADMIN — Medication 100 MICROGRAM(S): at 05:41

## 2017-12-16 RX ADMIN — Medication 300 MILLIGRAM(S): at 11:48

## 2017-12-16 RX ADMIN — Medication 1 MILLIGRAM(S): at 11:48

## 2017-12-16 RX ADMIN — Medication 10 MILLIEQUIVALENT(S): at 17:48

## 2017-12-16 RX ADMIN — Medication 25 MILLIGRAM(S): at 05:41

## 2017-12-16 RX ADMIN — Medication 40 MILLIGRAM(S): at 17:48

## 2017-12-16 RX ADMIN — ATORVASTATIN CALCIUM 20 MILLIGRAM(S): 80 TABLET, FILM COATED ORAL at 22:02

## 2017-12-16 RX ADMIN — Medication 25 MILLIGRAM(S): at 17:48

## 2017-12-16 RX ADMIN — WARFARIN SODIUM 2 MILLIGRAM(S): 2.5 TABLET ORAL at 22:02

## 2017-12-16 RX ADMIN — Medication 650 MILLIGRAM(S): at 14:59

## 2017-12-16 NOTE — PROGRESS NOTE ADULT - SUBJECTIVE AND OBJECTIVE BOX
Pt seen and examined. Pain controlled. No acute events overnight.  Denies any Fever/CHills/CP/SOB. Right hip pain improved.    Vital Signs Last 24 Hrs  T(C): 36.7 (16 Dec 2017 05:24), Max: 36.8 (15 Dec 2017 08:29)  T(F): 98.1 (16 Dec 2017 05:24), Max: 98.3 (15 Dec 2017 08:29)  HR: 59 (16 Dec 2017 05:24) (59 - 86)  BP: 124/72 (16 Dec 2017 05:24) (99/73 - 151/69)  RR: 14 (16 Dec 2017 05:24) (14 - 18)  SpO2: 98% (16 Dec 2017 05:24) (96% - 100%)    Gen: NAD  LLE:  Skin intact  Pain with ROM of L hip, unable to SLR L hip, neg log roll L hip  +sensation L2-S1  +dorsiflexion/plantarflexion of ankle/hallux  +dorsalis pedis pulse, weak but palpable   Soft compartments, - calf tenderness

## 2017-12-16 NOTE — PROGRESS NOTE ADULT - SUBJECTIVE AND OBJECTIVE BOX
c/c: left hip pain/fall    HPI:  88 y/o male with PMHx of pacemaker,Afib on coumadin, Hypothyroidism, HLD, CKD,  ,bilateral hip replacements presents to the ED c/o L hip pain s/p fall overnight at 1:30am 17. Pt states that he had been on the edge of the bed to get up to go to the bathroom and he slid downward on the carpet. Notes a prior fall a few months ago, head CT was negative. Denies head trauma, LOC, or any other traumatic injuries or pain elsewhere.  Pt ambulates with walker at home  trauma w/u done in ED head CT,Ct chest and Ct abd/pelvis-large left kidney cyst exophytic renal cyst vs retroperitoneal ,sigmoid diverticulosis without diverticulitis,left apical scarring/fibrosis  patient seen by ortho in Ed for  left hip periprosthetic fx     : pt seen and examined this am. Was told non operative mx was recommended by ortho. Pain relatively controlled at present while at rest. No sob/chest pain. tolerating po.     Review of system- All 10 systems reviewed and is as per HPI otherwise negative.       T(C): 36.4 (17 @ 11:05), Max: 36.8 (12-15-17 @ 17:12)  HR: 60 (17 @ 11:05) (59 - 61)  BP: 129/63 (-17 @ 11:05) (99/73 - 129/63)  RR: 18 (17 @ 11:05) (14 - 18)  SpO2: 100% (17 @ 11:05) (96% - 100%)      PHYSICAL EXAM:    GENERAL: Comfortable, no acute distress  HEAD:  Atraumatic, Normocephalic  EYES: EOMI, PERRLA  HEENT: Moist mucous membranes  NECK: Supple, No JVD  NERVOUS SYSTEM:  Alert & Oriented X3, non focal.  CHEST/LUNG: Clear to auscultation bilaterally  HEART: Regular rate and rhythm;   ABDOMEN: Soft, Nontender, Nondistended; Bowel sounds present  GENITOURINARY- Voiding, no palpable bladder  EXTREMITIES:  b/l Le edema, hyperpigmentation, chronic dermatitis.   MUSCULOSKELETAL- right hip pain with movement.   SKIN-chronic LE dermatitis.       LABS:                        12.2   7.2   )-----------( 149      ( 16 Dec 2017 06:53 )             38.8     12-    144  |  106  |  35<H>  ----------------------------<  88  3.8   |  30  |  1.77<H>    Ca    8.9      16 Dec 2017 06:53  Phos  2.5     12-  Mg     2.2     -    TPro  6.3  /  Alb  3.0<L>  /  TBili  0.9  /  DBili  x   /  AST  160<H>  /  ALT  219<H>  /  AlkPhos  225<H>  12-    PT/INR - ( 16 Dec 2017 06:53 )   PT: 32.4 sec;   INR: 2.93 ratio         PTT - ( 16 Dec 2017 06:53 )  PTT:44.9 sec  Urinalysis Basic - ( 14 Dec 2017 20:10 )    Color: Yellow / Appearance: Clear / S.010 / pH: x  Gluc: x / Ketone: Negative  / Bili: Negative / Urobili: Negative mg/dL   Blood: x / Protein: Negative mg/dL / Nitrite: Negative   Leuk Esterase: Trace / RBC: 0-2 /HPF / WBC 0-2   Sq Epi: x / Non Sq Epi: x / Bacteria: Occasional      MEDS  acetaminophen   Tablet 650 milliGRAM(s) Oral every 6 hours PRN  allopurinol 300 milliGRAM(s) Oral daily  amiodarone    Tablet 200 milliGRAM(s) Oral daily  atorvastatin 20 milliGRAM(s) Oral at bedtime  folic acid 1 milliGRAM(s) Oral daily  levothyroxine 100 MICROGram(s) Oral daily  metoprolol     tartrate 25 milliGRAM(s) Oral two times a day  warfarin 1 milliGRAM(s) Oral every other day  warfarin 2 milliGRAM(s) Oral every other day    ASSESSMENT AND PLAN:  87M, PMH AS ABOVE A/W:    1. Fall/left periprosthetic hip fracture:  -f/u final ortho recommendations  -physical therapy  -incentive spirometry  -pain control    2. hx of Afib [id number] with PPM  -INR therapeutic, continue coumadin, continue BB, continue amiodarone ,lipitor  -hold coumadin if OR planned.    3.  CKD stage 4 vs superimposed FRANCINE   -monitor renal function    4. hx Chronic venous insufficiency  -resume lasix     5. ELevated LFTs:  -no liver/gb abnormalities on imaging  -repeat in am.    6.  DVT prophylaxis- on coumadin

## 2017-12-16 NOTE — PROGRESS NOTE ADULT - ASSESSMENT
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 Zimmer does not want to operate in the acute setting  Patient states he now wants surgery   Pain control  NWB LLE   DVT ppx- can continue coumadin dosing  PT  Continue care per primary team  attending aware and agrees with above  will advise if plan changes

## 2017-12-17 LAB
-  AMIKACIN: SIGNIFICANT CHANGE UP
-  AZTREONAM: SIGNIFICANT CHANGE UP
-  CEFEPIME: SIGNIFICANT CHANGE UP
-  CEFTAZIDIME: SIGNIFICANT CHANGE UP
-  CIPROFLOXACIN: SIGNIFICANT CHANGE UP
-  GENTAMICIN: SIGNIFICANT CHANGE UP
-  IMIPENEM: SIGNIFICANT CHANGE UP
-  LEVOFLOXACIN: SIGNIFICANT CHANGE UP
-  MEROPENEM: SIGNIFICANT CHANGE UP
-  PIPERACILLIN/TAZOBACTAM: SIGNIFICANT CHANGE UP
-  TOBRAMYCIN: SIGNIFICANT CHANGE UP
ALBUMIN SERPL ELPH-MCNC: 3 G/DL — LOW (ref 3.3–5)
ALP SERPL-CCNC: 202 U/L — HIGH (ref 40–120)
ALT FLD-CCNC: 152 U/L — HIGH (ref 12–78)
ANION GAP SERPL CALC-SCNC: 7 MMOL/L — SIGNIFICANT CHANGE UP (ref 5–17)
AST SERPL-CCNC: 74 U/L — HIGH (ref 15–37)
BASOPHILS # BLD AUTO: 0 K/UL — SIGNIFICANT CHANGE UP (ref 0–0.2)
BASOPHILS NFR BLD AUTO: 0.5 % — SIGNIFICANT CHANGE UP (ref 0–2)
BILIRUB SERPL-MCNC: 0.7 MG/DL — SIGNIFICANT CHANGE UP (ref 0.2–1.2)
BUN SERPL-MCNC: 36 MG/DL — HIGH (ref 7–23)
CALCIUM SERPL-MCNC: 8.9 MG/DL — SIGNIFICANT CHANGE UP (ref 8.5–10.1)
CHLORIDE SERPL-SCNC: 103 MMOL/L — SIGNIFICANT CHANGE UP (ref 96–108)
CO2 SERPL-SCNC: 32 MMOL/L — HIGH (ref 22–31)
CREAT SERPL-MCNC: 1.67 MG/DL — HIGH (ref 0.5–1.3)
CULTURE RESULTS: SIGNIFICANT CHANGE UP
EOSINOPHIL # BLD AUTO: 0.1 K/UL — SIGNIFICANT CHANGE UP (ref 0–0.5)
EOSINOPHIL NFR BLD AUTO: 1.7 % — SIGNIFICANT CHANGE UP (ref 0–6)
GLUCOSE SERPL-MCNC: 93 MG/DL — SIGNIFICANT CHANGE UP (ref 70–99)
HCT VFR BLD CALC: 38.7 % — LOW (ref 39–50)
HGB BLD-MCNC: 12.3 G/DL — LOW (ref 13–17)
INR BLD: 2.7 RATIO — HIGH (ref 0.88–1.16)
LYMPHOCYTES # BLD AUTO: 1.3 K/UL — SIGNIFICANT CHANGE UP (ref 1–3.3)
LYMPHOCYTES # BLD AUTO: 18.3 % — SIGNIFICANT CHANGE UP (ref 13–44)
MCHC RBC-ENTMCNC: 30.2 PG — SIGNIFICANT CHANGE UP (ref 27–34)
MCHC RBC-ENTMCNC: 31.8 GM/DL — LOW (ref 32–36)
MCV RBC AUTO: 94.9 FL — SIGNIFICANT CHANGE UP (ref 80–100)
METHOD TYPE: SIGNIFICANT CHANGE UP
MONOCYTES # BLD AUTO: 0.6 K/UL — SIGNIFICANT CHANGE UP (ref 0–0.9)
MONOCYTES NFR BLD AUTO: 7.7 % — SIGNIFICANT CHANGE UP (ref 2–14)
NEUTROPHILS # BLD AUTO: 5.2 K/UL — SIGNIFICANT CHANGE UP (ref 1.8–7.4)
NEUTROPHILS NFR BLD AUTO: 71.8 % — SIGNIFICANT CHANGE UP (ref 43–77)
ORGANISM # SPEC MICROSCOPIC CNT: SIGNIFICANT CHANGE UP
ORGANISM # SPEC MICROSCOPIC CNT: SIGNIFICANT CHANGE UP
PLATELET # BLD AUTO: 160 K/UL — SIGNIFICANT CHANGE UP (ref 150–400)
POTASSIUM SERPL-MCNC: 4.3 MMOL/L — SIGNIFICANT CHANGE UP (ref 3.5–5.3)
POTASSIUM SERPL-SCNC: 4.3 MMOL/L — SIGNIFICANT CHANGE UP (ref 3.5–5.3)
PROT SERPL-MCNC: 6.3 GM/DL — SIGNIFICANT CHANGE UP (ref 6–8.3)
PROTHROM AB SERPL-ACNC: 29.8 SEC — HIGH (ref 9.8–12.7)
RBC # BLD: 4.08 M/UL — LOW (ref 4.2–5.8)
RBC # FLD: 15.6 % — HIGH (ref 10.3–14.5)
SODIUM SERPL-SCNC: 142 MMOL/L — SIGNIFICANT CHANGE UP (ref 135–145)
SPECIMEN SOURCE: SIGNIFICANT CHANGE UP
WBC # BLD: 7.3 K/UL — SIGNIFICANT CHANGE UP (ref 3.8–10.5)
WBC # FLD AUTO: 7.3 K/UL — SIGNIFICANT CHANGE UP (ref 3.8–10.5)

## 2017-12-17 RX ORDER — OXYCODONE HYDROCHLORIDE 5 MG/1
5 TABLET ORAL EVERY 6 HOURS
Qty: 0 | Refills: 0 | Status: DISCONTINUED | OUTPATIENT
Start: 2017-12-17 | End: 2017-12-18

## 2017-12-17 RX ORDER — MORPHINE SULFATE 50 MG/1
4 CAPSULE, EXTENDED RELEASE ORAL EVERY 4 HOURS
Qty: 0 | Refills: 0 | Status: DISCONTINUED | OUTPATIENT
Start: 2017-12-17 | End: 2017-12-18

## 2017-12-17 RX ADMIN — Medication 25 MILLIGRAM(S): at 05:36

## 2017-12-17 RX ADMIN — Medication 100 MILLIGRAM(S): at 21:47

## 2017-12-17 RX ADMIN — Medication 25 MILLIGRAM(S): at 17:38

## 2017-12-17 RX ADMIN — AMIODARONE HYDROCHLORIDE 200 MILLIGRAM(S): 400 TABLET ORAL at 05:36

## 2017-12-17 RX ADMIN — Medication 40 MILLIGRAM(S): at 05:36

## 2017-12-17 RX ADMIN — Medication 40 MILLIGRAM(S): at 17:38

## 2017-12-17 RX ADMIN — MORPHINE SULFATE 4 MILLIGRAM(S): 50 CAPSULE, EXTENDED RELEASE ORAL at 10:15

## 2017-12-17 RX ADMIN — Medication 100 MILLIGRAM(S): at 12:31

## 2017-12-17 RX ADMIN — SENNA PLUS 2 TABLET(S): 8.6 TABLET ORAL at 21:48

## 2017-12-17 RX ADMIN — MORPHINE SULFATE 4 MILLIGRAM(S): 50 CAPSULE, EXTENDED RELEASE ORAL at 10:45

## 2017-12-17 RX ADMIN — ATORVASTATIN CALCIUM 20 MILLIGRAM(S): 80 TABLET, FILM COATED ORAL at 21:48

## 2017-12-17 RX ADMIN — Medication 300 MILLIGRAM(S): at 12:30

## 2017-12-17 RX ADMIN — WARFARIN SODIUM 1 MILLIGRAM(S): 2.5 TABLET ORAL at 21:48

## 2017-12-17 RX ADMIN — Medication 10 MILLIEQUIVALENT(S): at 17:38

## 2017-12-17 RX ADMIN — Medication 10 MILLIEQUIVALENT(S): at 05:36

## 2017-12-17 RX ADMIN — Medication 1 MILLIGRAM(S): at 12:30

## 2017-12-17 RX ADMIN — Medication 100 MICROGRAM(S): at 05:36

## 2017-12-17 NOTE — PROGRESS NOTE ADULT - SUBJECTIVE AND OBJECTIVE BOX
c/c: left hip pain/fall    HPI:  88 y/o male with PMHx of pacemaker,Afib on coumadin, Hypothyroidism, HLD, CKD,  ,bilateral hip replacements presents to the ED c/o L hip pain s/p fall overnight at 1:30am 12/14/17. Pt states that he had been on the edge of the bed to get up to go to the bathroom and he slid downward on the carpet. Notes a prior fall a few months ago, head CT was negative. Denies head trauma, LOC, or any other traumatic injuries or pain elsewhere.  Pt ambulates with walker at home  trauma w/u done in ED head CT,Ct chest and Ct abd/pelvis-large left kidney cyst exophytic renal cyst vs retroperitoneal ,sigmoid diverticulosis without diverticulitis,left apical scarring/fibrosis  patient seen by ortho in Ed for  left hip periprosthetic fx. Non operative mx is recommended at this time.     12/17: pt seen and examined this am. Upset bc he thought he was supposed to have surgery. Multiple conversations held with patient re: plan of care.   No pain at present. No sob/chest pain.     Review of system- All 10 systems reviewed and is as per HPI otherwise negative.   Vital Signs Last 24 Hrs  T(C): 36.4 (17 Dec 2017 05:12), Max: 36.5 (16 Dec 2017 17:33)  T(F): 97.5 (17 Dec 2017 05:12), Max: 97.7 (16 Dec 2017 17:33)  HR: 59 (17 Dec 2017 05:12) (59 - 62)  BP: 133/60 (17 Dec 2017 05:12) (122/54 - 133/60)  BP(mean): 77 (16 Dec 2017 17:33) (77 - 77)  RR: 17 (17 Dec 2017 05:12) (17 - 18)  SpO2: 100% (17 Dec 2017 05:12) (98% - 100%)PHYSICAL EXAM:    GENERAL: Comfortable, no acute distress  HEAD:  Atraumatic, Normocephalic  EYES: EOMI, PERRLA  HEENT: Moist mucous membranes  NECK: Supple, No JVD  NERVOUS SYSTEM:  Alert & Oriented X3, non focal.  CHEST/LUNG: Clear to auscultation bilaterally  HEART: Regular rate and rhythm;   ABDOMEN: Soft, Nontender, Nondistended; Bowel sounds present  GENITOURINARY- Voiding, no palpable bladder  EXTREMITIES:  b/l Le edema, hyperpigmentation, chronic dermatitis.   MUSCULOSKELETAL- right hip pain with movement.   SKIN-chronic LE dermatitis.     LABS:                        12.3   7.3   )-----------( 160      ( 17 Dec 2017 06:47 )             38.7     12-17    142  |  103  |  36<H>  ----------------------------<  93  4.3   |  32<H>  |  1.67<H>    Ca    8.9      17 Dec 2017 06:47  Phos  2.5     12-16  Mg     2.2     12-16    TPro  6.3  /  Alb  3.0<L>  /  TBili  0.7  /  DBili  x   /  AST  74<H>  /  ALT  152<H>  /  AlkPhos  202<H>  12-17    PT/INR - ( 17 Dec 2017 06:47 )   PT: 29.8 sec;   INR: 2.70 ratio         PTT - ( 16 Dec 2017 06:53 )  PTT:44.9 sec                MEDS  acetaminophen   Tablet 650 milliGRAM(s) Oral every 6 hours PRN  allopurinol 300 milliGRAM(s) Oral daily  amiodarone    Tablet 200 milliGRAM(s) Oral daily  atorvastatin 20 milliGRAM(s) Oral at bedtime  folic acid 1 milliGRAM(s) Oral daily  levothyroxine 100 MICROGram(s) Oral daily  metoprolol     tartrate 25 milliGRAM(s) Oral two times a day  warfarin 1 milliGRAM(s) Oral every other day  warfarin 2 milliGRAM(s) Oral every other day    ASSESSMENT AND PLAN:  87M, PMH AS ABOVE A/W:    1. Fall/left periprosthetic hip fracture:  -non operative mx per ortho at this time.   -physical therapy  -incentive spirometry  -pain control    2. hx of Afib [id number] with PPM  -INR therapeutic, continue coumadin, continue BB, continue amiodarone ,lipitor    3. 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  -monitor.     4. hx Chronic venous insufficiency  -resumed lasix     5. ELevated LFTs:  -no liver/gb abnormalities on imaging  -?due to passive congestion  -repeat in am    6.  DVT prophylaxis- on coumadin    7. Dispo:  dc planning to rehab once bed available likely tomorrow

## 2017-12-17 NOTE — PROGRESS NOTE ADULT - SUBJECTIVE AND OBJECTIVE BOX
Pt seen and examined. Pain controlled. No acute events overnight.  Denies any Fever/CHills/CP/SOB.     Vital Signs Last 24 Hrs  T(C): 36.4 (17 Dec 2017 05:12), Max: 36.5 (16 Dec 2017 17:33)  T(F): 97.5 (17 Dec 2017 05:12), Max: 97.7 (16 Dec 2017 17:33)  HR: 59 (17 Dec 2017 05:12) (59 - 62)  BP: 133/60 (17 Dec 2017 05:12) (122/54 - 133/60)  BP(mean): 77 (16 Dec 2017 17:33) (77 - 77)  RR: 17 (17 Dec 2017 05:12) (17 - 18)  SpO2: 100% (17 Dec 2017 05:12) (98% - 100%)    Gen: NAD  LLE:  Skin intact  Pain with ROM of L hip, unable to SLR L hip, neg log roll L hip  +sensation L2-S1  +dorsiflexion/plantarflexion of ankle/hallux  +dorsalis pedis pulse, weak but palpable   Soft compartments, - calf tenderness

## 2017-12-17 NOTE — PROGRESS NOTE ADULT - ASSESSMENT
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 Zimmer does not want to operate in the acute setting, patient will remain non weight bearing until fracture heals  Plans for major revision surgery will be done as outpatient with Dr. 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 Zimmer does not want to operate in the acute setting, patient will remain non weight bearing until fracture heals  Plans for major revision surgery will be done as outpatient with Dr. Zimmer or with Orthopedic surgeon of his choosing   Pain control  Protective Toe touch weight bearing LLE   DVT ppx- can continue coumadin dosing  PT  Continue care per primary team  attending aware and agrees with above  Ortho stable for DC

## 2017-12-18 ENCOUNTER — TRANSCRIPTION ENCOUNTER (OUTPATIENT)
Age: 82
End: 2017-12-18

## 2017-12-18 VITALS
SYSTOLIC BLOOD PRESSURE: 122 MMHG | HEART RATE: 60 BPM | RESPIRATION RATE: 17 BRPM | TEMPERATURE: 98 F | OXYGEN SATURATION: 100 % | DIASTOLIC BLOOD PRESSURE: 51 MMHG

## 2017-12-18 LAB
ALBUMIN SERPL ELPH-MCNC: 2.9 G/DL — LOW (ref 3.3–5)
ALP SERPL-CCNC: 174 U/L — HIGH (ref 40–120)
ALT FLD-CCNC: 105 U/L — HIGH (ref 12–78)
ANION GAP SERPL CALC-SCNC: 4 MMOL/L — LOW (ref 5–17)
AST SERPL-CCNC: 39 U/L — HIGH (ref 15–37)
BILIRUB SERPL-MCNC: 0.6 MG/DL — SIGNIFICANT CHANGE UP (ref 0.2–1.2)
BUN SERPL-MCNC: 39 MG/DL — HIGH (ref 7–23)
CALCIUM SERPL-MCNC: 8.8 MG/DL — SIGNIFICANT CHANGE UP (ref 8.5–10.1)
CHLORIDE SERPL-SCNC: 106 MMOL/L — SIGNIFICANT CHANGE UP (ref 96–108)
CO2 SERPL-SCNC: 33 MMOL/L — HIGH (ref 22–31)
CREAT SERPL-MCNC: 1.94 MG/DL — HIGH (ref 0.5–1.3)
GLUCOSE SERPL-MCNC: 102 MG/DL — HIGH (ref 70–99)
INR BLD: 2.53 RATIO — HIGH (ref 0.88–1.16)
MAGNESIUM SERPL-MCNC: 2.3 MG/DL — SIGNIFICANT CHANGE UP (ref 1.6–2.6)
PHOSPHATE SERPL-MCNC: 2.7 MG/DL — SIGNIFICANT CHANGE UP (ref 2.5–4.5)
POTASSIUM SERPL-MCNC: 4.3 MMOL/L — SIGNIFICANT CHANGE UP (ref 3.5–5.3)
POTASSIUM SERPL-SCNC: 4.3 MMOL/L — SIGNIFICANT CHANGE UP (ref 3.5–5.3)
PROT SERPL-MCNC: 6.1 GM/DL — SIGNIFICANT CHANGE UP (ref 6–8.3)
PROTHROM AB SERPL-ACNC: 27.9 SEC — HIGH (ref 9.8–12.7)
SODIUM SERPL-SCNC: 143 MMOL/L — SIGNIFICANT CHANGE UP (ref 135–145)

## 2017-12-18 RX ORDER — SENNA PLUS 8.6 MG/1
2 TABLET ORAL
Qty: 0 | Refills: 0 | DISCHARGE
Start: 2017-12-18

## 2017-12-18 RX ORDER — METOPROLOL TARTRATE 50 MG
1 TABLET ORAL
Qty: 0 | Refills: 0 | DISCHARGE
Start: 2017-12-18

## 2017-12-18 RX ORDER — FOLIC ACID 0.8 MG
1 TABLET ORAL
Qty: 0 | Refills: 0 | COMMUNITY

## 2017-12-18 RX ORDER — LEVOTHYROXINE SODIUM 125 MCG
1 TABLET ORAL
Qty: 0 | Refills: 0 | COMMUNITY

## 2017-12-18 RX ORDER — METOPROLOL TARTRATE 50 MG
1 TABLET ORAL
Qty: 0 | Refills: 0 | COMMUNITY

## 2017-12-18 RX ORDER — FUROSEMIDE 40 MG
40 TABLET ORAL
Qty: 0 | Refills: 0 | COMMUNITY

## 2017-12-18 RX ORDER — ACETAMINOPHEN 500 MG
2 TABLET ORAL
Qty: 0 | Refills: 0 | DISCHARGE
Start: 2017-12-18

## 2017-12-18 RX ORDER — DIOSMIN COMPLEX NO.1 630 MG
1 TABLET ORAL
Qty: 0 | Refills: 0 | COMMUNITY

## 2017-12-18 RX ORDER — FOLIC ACID 0.8 MG
1 TABLET ORAL
Qty: 0 | Refills: 0 | DISCHARGE
Start: 2017-12-18

## 2017-12-18 RX ORDER — LEVOTHYROXINE SODIUM 125 MCG
1 TABLET ORAL
Qty: 0 | Refills: 0 | DISCHARGE
Start: 2017-12-18

## 2017-12-18 RX ORDER — POTASSIUM CHLORIDE 20 MEQ
1 PACKET (EA) ORAL
Qty: 0 | Refills: 0 | DISCHARGE
Start: 2017-12-18

## 2017-12-18 RX ORDER — FUROSEMIDE 40 MG
1 TABLET ORAL
Qty: 0 | Refills: 0 | DISCHARGE
Start: 2017-12-18

## 2017-12-18 RX ORDER — ALLOPURINOL 300 MG
1 TABLET ORAL
Qty: 0 | Refills: 0 | DISCHARGE
Start: 2017-12-18

## 2017-12-18 RX ORDER — POLYETHYLENE GLYCOL 3350 17 G/17G
17 POWDER, FOR SOLUTION ORAL
Qty: 0 | Refills: 0 | DISCHARGE
Start: 2017-12-18

## 2017-12-18 RX ORDER — AMIODARONE HYDROCHLORIDE 400 MG/1
1 TABLET ORAL
Qty: 0 | Refills: 0 | COMMUNITY

## 2017-12-18 RX ORDER — ATORVASTATIN CALCIUM 80 MG/1
1 TABLET, FILM COATED ORAL
Qty: 0 | Refills: 0 | DISCHARGE
Start: 2017-12-18

## 2017-12-18 RX ORDER — POTASSIUM CHLORIDE 20 MEQ
0 PACKET (EA) ORAL
Qty: 0 | Refills: 0 | COMMUNITY

## 2017-12-18 RX ORDER — AMIODARONE HYDROCHLORIDE 400 MG/1
1 TABLET ORAL
Qty: 0 | Refills: 0 | DISCHARGE
Start: 2017-12-18

## 2017-12-18 RX ORDER — ATORVASTATIN CALCIUM 80 MG/1
1 TABLET, FILM COATED ORAL
Qty: 0 | Refills: 0 | COMMUNITY

## 2017-12-18 RX ORDER — ALLOPURINOL 300 MG
1 TABLET ORAL
Qty: 0 | Refills: 0 | COMMUNITY

## 2017-12-18 RX ORDER — OXYCODONE HYDROCHLORIDE 5 MG/1
1 TABLET ORAL
Qty: 0 | Refills: 0 | DISCHARGE
Start: 2017-12-18

## 2017-12-18 RX ORDER — DOCUSATE SODIUM 100 MG
1 CAPSULE ORAL
Qty: 0 | Refills: 0 | DISCHARGE
Start: 2017-12-18

## 2017-12-18 RX ADMIN — AMIODARONE HYDROCHLORIDE 200 MILLIGRAM(S): 400 TABLET ORAL at 07:07

## 2017-12-18 RX ADMIN — Medication 1 MILLIGRAM(S): at 11:09

## 2017-12-18 RX ADMIN — Medication 10 MILLIEQUIVALENT(S): at 16:48

## 2017-12-18 RX ADMIN — Medication 25 MILLIGRAM(S): at 16:48

## 2017-12-18 RX ADMIN — Medication 100 MILLIGRAM(S): at 14:05

## 2017-12-18 RX ADMIN — Medication 40 MILLIGRAM(S): at 16:47

## 2017-12-18 RX ADMIN — Medication 10 MILLIEQUIVALENT(S): at 07:08

## 2017-12-18 RX ADMIN — Medication 100 MILLIGRAM(S): at 07:07

## 2017-12-18 RX ADMIN — Medication 25 MILLIGRAM(S): at 07:08

## 2017-12-18 RX ADMIN — Medication 300 MILLIGRAM(S): at 11:09

## 2017-12-18 RX ADMIN — Medication 100 MICROGRAM(S): at 07:08

## 2017-12-18 RX ADMIN — POLYETHYLENE GLYCOL 3350 17 GRAM(S): 17 POWDER, FOR SOLUTION ORAL at 11:09

## 2017-12-18 RX ADMIN — Medication 40 MILLIGRAM(S): at 07:08

## 2017-12-18 NOTE — PROGRESS NOTE ADULT - SUBJECTIVE AND OBJECTIVE BOX
c/c: left hip pain/fall    HPI:  86 y/o male with PMHx of pacemaker,Afib on coumadin, Hypothyroidism, HLD, CKD,  ,bilateral hip replacements presents to the ED c/o L hip pain s/p fall overnight at 1:30am 12/14/17. Pt states that he had been on the edge of the bed to get up to go to the bathroom and he slid downward on the carpet. Notes a prior fall a few months ago, head CT was negative. Denies head trauma, LOC, or any other traumatic injuries or pain elsewhere.  Pt ambulates with walker at home  trauma w/u done in ED head CT,Ct chest and Ct abd/pelvis-large left kidney cyst exophytic renal cyst vs retroperitoneal ,sigmoid diverticulosis without diverticulitis,left apical scarring/fibrosis  patient seen by ortho in Ed for  left hip periprosthetic fx. Non operative mx is recommended at this time.     12/17: pt seen and examined this am. Upset bc he thought he was supposed to have surgery. Multiple conversations held with patient re: plan of care.   12/18 in pain. Otherwise no acute events    Review of system- All 10 systems reviewed and is as per HPI otherwise negative.     Vital Signs Last 24 Hrs  T(C): 36.7 (18 Dec 2017 11:20), Max: 36.7 (18 Dec 2017 11:20)  T(F): 98 (18 Dec 2017 11:20), Max: 98 (18 Dec 2017 11:20)  HR: 62 (18 Dec 2017 11:20) (60 - 62)  BP: 127/71 (18 Dec 2017 11:20) (111/54 - 127/71)  BP(mean): --  RR: 16 (18 Dec 2017 11:20) (16 - 16)  SpO2: 97% (18 Dec 2017 11:20) (97% - 99%)    PHYSICAL EXAM:  GENERAL: Comfortable, no acute distress  HEAD:  Atraumatic, Normocephalic  EYES: EOMI, PERRLA  HEENT: Moist mucous membranes  NECK: Supple, No JVD  NERVOUS SYSTEM:  Alert & Oriented X3, non focal.  CHEST/LUNG: Clear to auscultation bilaterally  HEART: Regular rate and rhythm;   ABDOMEN: Soft, Nontender, Nondistended; Bowel sounds present  GENITOURINARY- Voiding, no palpable bladder  EXTREMITIES:  b/l Le edema, hyperpigmentation, chronic dermatitis.   MUSCULOSKELETAL- right hip pain with movement.   SKIN-chronic LE dermatitis.     LABS:                        12.3   7.3   )-----------( 160      ( 17 Dec 2017 06:47 )             38.7     143    |  106    |  39     ----------------------------<  102    4.3     |  33     |  1.94     Ca    8.8        18 Dec 2017 06:29  Phos  2.7       18 Dec 2017 06:29  Mg     2.3       18 Dec 2017 06:29  TPro  6.1    /  Alb  2.9    /  TBili  0.6    /  DBili  x      /  AST  39     /  ALT  105    /  AlkPhos  174    18 Dec 2017 06:29  LIVER FUNCTIONS - ( 18 Dec 2017 06:29 )  Alb: 2.9 g/dL / Pro: 6.1 gm/dL / ALK PHOS: 174 U/L / ALT: 105 U/L / AST: 39 U/L / GGT: x         PT/INR - ( 18 Dec 2017 06:29 )   PT: 27.9 sec;   INR: 2.53 ratio      MEDS  acetaminophen   Tablet 650 milliGRAM(s) Oral every 6 hours PRN  allopurinol 300 milliGRAM(s) Oral daily  amiodarone    Tablet 200 milliGRAM(s) Oral daily  atorvastatin 20 milliGRAM(s) Oral at bedtime  folic acid 1 milliGRAM(s) Oral daily  levothyroxine 100 MICROGram(s) Oral daily  metoprolol     tartrate 25 milliGRAM(s) Oral two times a day  warfarin 1 milliGRAM(s) Oral every other day  warfarin 2 milliGRAM(s) Oral every other day    ASSESSMENT AND PLAN:  #Fall/left periprosthetic hip fracture:  -non operative mx per ortho at this time.   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  -monitor.     #hx Chronic venous insufficiency  -resumed lasix     #ELevated LFTs:  -no liver/gb abnormalities on imaging  -?due to passive congestion  -repeat in am    #DVT prophylaxis- on coumadin    #Dispo- ILDEFONSO today

## 2017-12-18 NOTE — DISCHARGE NOTE ADULT - PATIENT PORTAL LINK FT
“You can access the [employer] Patient Portal, offered by [facility], by registering with the following website: [email or web address]”

## 2017-12-18 NOTE — DISCHARGE NOTE ADULT - CARE PLAN
Principal Discharge DX:	Periprosthetic fracture around internal prosthetic right hip joint  Goal:	non-surgical- ILDEFONSO  Instructions for follow-up, activity and diet:	outpatient f/u

## 2017-12-18 NOTE — DISCHARGE NOTE ADULT - HOSPITAL COURSE
PCP- DR Nunn  CC- Patient is a 87y old  Male who presents with a chief complaint of left hip pain s/p fall (15 Dec 2017 06:27)  HPI:  86 y/o male with PMHx of pacemaker,Afib , hx of renal cyst follows with dr hardin outpatient and abnormal kidney function follows with outpatient nephrology ,bilateral hip replacements presents to the ED c/o L hip pain s/p fall overnight at 1:30am 12/14/17. Pt states that he had been on the edge of the bed to get up to go to the bathroom and he slid downward on the carpet. Notes a prior fall a few months ago, head CT was negative. Denies head trauma, LOC, or any other traumatic injuries or pain elsewhere. On Coumadin. Pt ambulates with walker at home  trauma w/u done in ED head CT,Ct chest and Ct abd/pelvis-large left kidney cyst exophytic renal cyst vs retroperitoneal ,sigmoid diverticulosis without diverticulitis,left apical scarring/fibrosis  patient seen by ortho in Ed- possibility of left hip periprosthetic fx   INR 2.69 on coumadin  EKG paced rhythm   Hospital course- seen by ortho, non-surgical, cleared for PT and ILDEFONSO  Review of system- All 10 systems reviewed and is as per HPI otherwise negative.     T(C): 36.7 (12-18-17 @ 11:20), Max: 36.7 (12-18-17 @ 11:20)  HR: 62 (12-18-17 @ 11:20) (60 - 62)  BP: 127/71 (12-18-17 @ 11:20) (111/54 - 127/71)  RR: 16 (12-18-17 @ 11:20) (16 - 16)  SpO2: 97% (12-18-17 @ 11:20) (97% - 99%)  Wt(kg): --  LABS:                        12.3   7.3   )-----------( 160      ( 17 Dec 2017 06:47 )             38.7     143  |  106  |  39<H>  ----------------------------<  102<H>  4.3   |  33<H>  |  1.94<H>    Ca    8.8      18 Dec 2017 06:29  Phos  2.7     12-18  Mg     2.3     12-18  TPro  6.1  /  Alb  2.9<L>  /  TBili  0.6  /  DBili  x   /  AST  39<H>  /  ALT  105<H>  /  AlkPhos  174<H>  12-18  PT/INR - ( 18 Dec 2017 06:29 )   PT: 27.9 sec;   INR: 2.53 ratio      PHYSICAL EXAM:  GENERAL: NAD, well-groomed, well-developed  HEAD:  Atraumatic, Normocephalic  EYES: EOMI, PERRLA, conjunctiva and sclera clear  HEENT: Moist mucous membranes  NECK: Supple, No JVD  NERVOUS SYSTEM:  Alert & Oriented X3, Motor Strength 5/5 B/L upper and lower extremities; DTRs 2+ intact and symmetric  CHEST/LUNG: Clear to auscultation bilaterally; No rales, rhonchi, wheezing, or rubs  HEART: Regular rate and rhythm; No murmurs, rubs, or gallops  ABDOMEN: Soft, Nontender, Nondistended; Bowel sounds present  GENITOURINARY- Voiding, no palpable bladder  EXTREMITIES:  2+ Peripheral Pulses, No clubbing, cyanosis, or edema  MUSCULOSKELTAL- painful ROM LT leg  SKIN-no rash, no lesion  CNS- alert, oriented X3, non focal     Assessment/Plan  #LT periprosthetic fracture  Non-operative. For ILDEFONSO. Pain meds prn. Outpatient ortho f/u with DR Zimmer  #Afib on coumadin  Monitor INR at rehab  #Dispo- ILDEFONSO today

## 2017-12-18 NOTE — DISCHARGE NOTE ADULT - MEDICATION SUMMARY - MEDICATIONS TO TAKE
I will START or STAY ON the medications listed below when I get home from the hospital:    acetaminophen 325 mg oral tablet  -- 2 tab(s) by mouth every 6 hours, As needed, moderate pain  -- Indication: For Pain    oxyCODONE 5 mg oral tablet  -- 1 tab(s) by mouth every 6 hours, As needed, Moderate Pain (4 - 6)  -- Indication: For Pain    oxyCODONE 10 mg oral tablet  -- 1 tab(s) by mouth every 6 hours, As Needed for severe pain  -- Indication: For Pain    amiodarone 200 mg oral tablet  -- 1 tab(s) by mouth once a day  -- Indication: For afib    warfarin 2 mg oral tablet  -- 1 tab(s) by mouth every other day  -- Indication: For afib    warfarin  -- 1 milligram(s) by mouth every other day  -- Indication: For afib    allopurinol 300 mg oral tablet  -- 1 tab(s) by mouth once a day  -- Indication: For gout    atorvastatin 20 mg oral tablet  -- 1 tab(s) by mouth once a day (at bedtime)  -- Indication: For cholesterol    metoprolol tartrate 25 mg oral tablet  -- 1 tab(s) by mouth 2 times a day  -- Indication: For HTN    furosemide 40 mg oral tablet  -- 1 tab(s) by mouth 2 times a day  -- Indication: For HTN    docusate sodium 100 mg oral capsule  -- 1 cap(s) by mouth 3 times a day  -- Indication: For bowel regimen    polyethylene glycol 3350 oral powder for reconstitution  -- 17 gram(s) by mouth once a day  -- Indication: For bowel regimen    senna oral tablet  -- 2 tab(s) by mouth once a day (at bedtime)  -- Indication: For bowel regimen    potassium chloride 10 mEq oral tablet, extended release  -- 1 tab(s) by mouth 2 times a day  -- Indication: For Supplement    levothyroxine 100 mcg (0.1 mg) oral tablet  -- 1 tab(s) by mouth once a day  -- Indication: For thyroid    folic acid 1 mg oral tablet  -- 1 tab(s) by mouth once a day  -- Indication: For vitamin    Vasculera 630 mg oral tablet  -- 1 tab(s) by mouth once a day  -- Indication: For Home med

## 2017-12-18 NOTE — DISCHARGE NOTE ADULT - CARE PROVIDER_API CALL
Ben Zimmer (DO), Orthopaedic Surgery  [address] [place] [address]  Phone: [phone]  Fax: [phone]
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
## 2017-12-23 DIAGNOSIS — N17.9 ACUTE KIDNEY FAILURE, UNSPECIFIED: ICD-10-CM
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
## 2017-12-23 DIAGNOSIS — I73.9 PERIPHERAL VASCULAR DISEASE, UNSPECIFIED: ICD-10-CM

## 2017-12-23 DIAGNOSIS — E78.5 HYPERLIPIDEMIA, UNSPECIFIED: ICD-10-CM

## 2017-12-23 DIAGNOSIS — M97.02XA PERIPROSTHETIC FRACTURE AROUND INTERNAL PROSTHETIC LEFT HIP JOINT, INITIAL ENCOUNTER: ICD-10-CM

## 2017-12-23 DIAGNOSIS — N18.4 CHRONIC KIDNEY DISEASE, STAGE 4 (SEVERE): ICD-10-CM

## 2017-12-23 DIAGNOSIS — S72.001A FRACTURE OF UNSPECIFIED PART OF NECK OF RIGHT FEMUR, INITIAL ENCOUNTER FOR CLOSED FRACTURE: ICD-10-CM
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
## 2017-12-23 DIAGNOSIS — I12.9 HYPERTENSIVE CHRONIC KIDNEY DISEASE WITH STAGE 1 THROUGH STAGE 4 CHRONIC KIDNEY DISEASE, OR UNSPECIFIED CHRONIC KIDNEY DISEASE: ICD-10-CM

## 2017-12-23 DIAGNOSIS — S72.002A FRACTURE OF UNSPECIFIED PART OF NECK OF LEFT FEMUR, INITIAL ENCOUNTER FOR CLOSED FRACTURE: ICD-10-CM

## 2017-12-23 DIAGNOSIS — Z95.0 PRESENCE OF CARDIAC PACEMAKER: ICD-10-CM

## 2017-12-23 DIAGNOSIS — Z87.891 PERSONAL HISTORY OF NICOTINE DEPENDENCE: ICD-10-CM

## 2017-12-23 DIAGNOSIS — I48.91 UNSPECIFIED ATRIAL FIBRILLATION: ICD-10-CM

## 2017-12-23 DIAGNOSIS — I25.10 ATHEROSCLEROTIC HEART DISEASE OF NATIVE CORONARY ARTERY WITHOUT ANGINA PECTORIS: ICD-10-CM

## 2017-12-23 DIAGNOSIS — E03.9 HYPOTHYROIDISM, UNSPECIFIED: ICD-10-CM
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
## 2018-01-16 ENCOUNTER — APPOINTMENT (OUTPATIENT)
Dept: ELECTROPHYSIOLOGY | Facility: CLINIC | Age: 83
End: 2018-01-16
Payer: MEDICARE

## 2018-01-16 DIAGNOSIS — Z95.0 PRESENCE OF CARDIAC PACEMAKER: ICD-10-CM

## 2018-01-16 DIAGNOSIS — I49.5 SICK SINUS SYNDROME: ICD-10-CM

## 2018-01-16 PROCEDURE — 93293 PM PHONE R-STRIP DEVICE EVAL: CPT

## 2018-01-18 PROBLEM — I73.9 PERIPHERAL VASCULAR DISEASE, UNSPECIFIED: Chronic | Status: ACTIVE | Noted: 2017-12-15

## 2018-01-30 PROBLEM — Z95.0 CARDIAC PACEMAKER IN SITU: Status: ACTIVE | Noted: 2018-01-30

## 2018-01-30 PROBLEM — I49.5 SICK SINUS SYNDROME: Status: ACTIVE | Noted: 2018-01-30

## 2018-09-27 NOTE — DISCHARGE NOTE ADULT - NS AS DC STROKE DX YN
Carlie Mar RN   Registered Nurse      Pre Admission Screening   Signed                             []Hide copied text    []Hover for details      Anesthesia Assessment: Preoperative EQUATION     Planned Procedure: Procedure(s) (LRB):  CYSTOSCOPY, FLEXIBLE, POSSIBLE ANTEGRADE CYSTOSCOPY (N/A)  Requested Anesthesia Type:General/MAC  Surgeon: Alfredito Holland MD  Service: Urology  Known or anticipated Date of Surgery:10/4/2018     Surgeon notes: reviewed     Electronic QUestionnaire Assessment completed via nurse interview with patient.      NO AQ     Triage considerations:      The patient has no apparent active cardiac condition (No unstable coronary Syndrome such as severe unstable angina or recent [<1 month] myocardial infarction, decompensated CHF, severe valvular   disease or significant arrhythmia)     Previous anesthesia records:GETA, MAC, No problems and Not available     [facility]   Subspecialty notes: Endocrinology     Other important co-morbidities: none     Tests already available:  Available tests,  within 1 month . 9/8/18 EKG, CMP, CBC.                            Instructions given. (See in Nurse's note)     Optimization:  Anesthesia Preop Clinic Assessment  Indicated-not required for this procedure                Plan:    Testing:  none                           Patient  has previously scheduled Medical Appointment: none     Navigation:                          Straight Line to surgery.                          No tests, anesthesia preop clinic visit, or consult required.                                                            Electronically signed by Carlie Mar RN at 9/27/2018  2:52 PM       Pre-admit on 10/4/2018            Detailed Report                                                                                                                   09/27/2018  Xu Bonilla is a 23 y.o., male.  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  Past Surgical History:   Procedure Laterality Date    bilateral subcutaneous mastectomy  2015    HYSTERECTOMY      phalloplasty  2018    TONSILLECTOMY  2017         Vital Signs Range (Last 24H):  Temp:  [37.1 °C (98.8 °F)]   Pulse:  [66]   Resp:  [18]   BP: (134)/(82)   SpO2:  [97 %]       CBC:     Recent Labs   Lab  18   1301   WBC  4.58   RBC  4.80   HGB  13.5*   HCT  42.5   PLT  201   MCV  89   MCH  28.1   MCHC  31.8*       CMP:   Recent Labs   Lab  18   1301   NA  142   K  3.9   CL  107   CO2  26   BUN  10   CREATININE  1.0   GLU  76   CALCIUM  9.6   ALBUMIN  3.5   PROT  6.5   ALKPHOS  81   ALT  20   AST  17   BILITOT  0.2       INR:  No results for input(s): PT, INR, PROTIME, APTT in the last 720 hours.      Diagnostic Studies:      EKD Echo:  Anesthesia Evaluation    I have reviewed the Patient Summary Reports.     I have reviewed the Nursing Notes.   I have reviewed the Medications.     Review of Systems  Anesthesia Hx:  No problems with previous Anesthesia Denies Family Hx of Anesthesia complications.   Denies Personal Hx of Anesthesia complications.   Social:  Non-Smoker, Social Alcohol Use    Hematology/Oncology:  Hematology Normal   Oncology Normal     EENT/Dental:EENT/Dental Normal   Cardiovascular:  Cardiovascular Normal Exercise tolerance: good   Denies Angina.    Pulmonary:  Pulmonary Normal  Denies Shortness of breath.  Denies Recent URI.    Renal/:   Transgender-S/P sex reassignment surgery Other Renal / Gu Conditions: (urethral fistula)   Hepatic/GI:  Hepatic/GI Normal    Musculoskeletal:  Musculoskeletal Normal    Neurological:  Neurology Normal    Endocrine:  Endocrine Normal    Psych:  Attention Deficit Disorder.         Physical Exam  General:  Well nourished    Airway/Jaw/Neck:  Airway Findings: Mouth Opening: Normal  Tongue: Normal  General Airway Assessment: Good  Mallampati: I  TM Distance: Normal, at least 6 cm  Jaw/Neck Findings:  Neck ROM: Normal ROM      Dental:  Dental Findings: In tact   Chest/Lungs:  Chest/Lungs Findings: Normal Respiratory Rate     Heart/Vascular:  Heart Findings:       Mental Status:  Mental Status Findings:  Cooperative, Alert and Oriented         Anesthesia Plan  Type of Anesthesia, risks & benefits discussed:  Anesthesia Type:  general  Patient's Preference:   Intra-op Monitoring Plan:   Intra-op Monitoring Plan Comments:   Post Op Pain Control Plan:   Post Op Pain Control Plan Comments:   Induction:   IV  Beta Blocker:  Patient is not currently on a Beta-Blocker (No further documentation required).       Informed Consent: Patient understands risks and agrees with Anesthesia plan.  Questions answered. Anesthesia consent signed with patient.  ASA Score: 2     Day of Surgery Review of History & Physical:    H&P update referred to the surgeon.         Ready For Surgery From Anesthesia Perspective.        no

## 2019-02-22 ENCOUNTER — EMERGENCY (EMERGENCY)
Facility: HOSPITAL | Age: 84
LOS: 0 days | Discharge: HOME | End: 2019-02-22
Attending: EMERGENCY MEDICINE
Payer: MEDICARE

## 2019-02-22 VITALS
DIASTOLIC BLOOD PRESSURE: 54 MMHG | RESPIRATION RATE: 16 BRPM | HEART RATE: 61 BPM | TEMPERATURE: 97 F | SYSTOLIC BLOOD PRESSURE: 121 MMHG | OXYGEN SATURATION: 100 %

## 2019-02-22 VITALS — HEIGHT: 67 IN | WEIGHT: 231.93 LBS

## 2019-02-22 DIAGNOSIS — Z79.899 OTHER LONG TERM (CURRENT) DRUG THERAPY: ICD-10-CM

## 2019-02-22 DIAGNOSIS — I48.91 UNSPECIFIED ATRIAL FIBRILLATION: ICD-10-CM

## 2019-02-22 DIAGNOSIS — L03.115 CELLULITIS OF RIGHT LOWER LIMB: ICD-10-CM

## 2019-02-22 DIAGNOSIS — Z96.643 PRESENCE OF ARTIFICIAL HIP JOINT, BILATERAL: Chronic | ICD-10-CM

## 2019-02-22 DIAGNOSIS — I10 ESSENTIAL (PRIMARY) HYPERTENSION: ICD-10-CM

## 2019-02-22 DIAGNOSIS — Z90.49 ACQUIRED ABSENCE OF OTHER SPECIFIED PARTS OF DIGESTIVE TRACT: Chronic | ICD-10-CM

## 2019-02-22 DIAGNOSIS — L03.116 CELLULITIS OF LEFT LOWER LIMB: ICD-10-CM

## 2019-02-22 DIAGNOSIS — Z87.891 PERSONAL HISTORY OF NICOTINE DEPENDENCE: ICD-10-CM

## 2019-02-22 DIAGNOSIS — Z79.01 LONG TERM (CURRENT) USE OF ANTICOAGULANTS: ICD-10-CM

## 2019-02-22 DIAGNOSIS — Z95.0 PRESENCE OF CARDIAC PACEMAKER: ICD-10-CM

## 2019-02-22 DIAGNOSIS — M79.89 OTHER SPECIFIED SOFT TISSUE DISORDERS: ICD-10-CM

## 2019-02-22 PROBLEM — G62.9 POLYNEUROPATHY, UNSPECIFIED: Chronic | Status: ACTIVE | Noted: 2017-12-15

## 2019-02-22 LAB
ALBUMIN SERPL ELPH-MCNC: 3 G/DL — LOW (ref 3.3–5)
ALP SERPL-CCNC: 141 U/L — HIGH (ref 40–120)
ALT FLD-CCNC: 27 U/L — SIGNIFICANT CHANGE UP (ref 12–78)
ANION GAP SERPL CALC-SCNC: 7 MMOL/L — SIGNIFICANT CHANGE UP (ref 5–17)
APTT BLD: 47.8 SEC — HIGH (ref 27.5–36.3)
AST SERPL-CCNC: 20 U/L — SIGNIFICANT CHANGE UP (ref 15–37)
BASOPHILS # BLD AUTO: 0.02 K/UL — SIGNIFICANT CHANGE UP (ref 0–0.2)
BASOPHILS NFR BLD AUTO: 0.3 % — SIGNIFICANT CHANGE UP (ref 0–2)
BILIRUB SERPL-MCNC: 0.5 MG/DL — SIGNIFICANT CHANGE UP (ref 0.2–1.2)
BUN SERPL-MCNC: 35 MG/DL — HIGH (ref 7–23)
CALCIUM SERPL-MCNC: 8.6 MG/DL — SIGNIFICANT CHANGE UP (ref 8.5–10.1)
CHLORIDE SERPL-SCNC: 106 MMOL/L — SIGNIFICANT CHANGE UP (ref 96–108)
CO2 SERPL-SCNC: 32 MMOL/L — HIGH (ref 22–31)
CREAT SERPL-MCNC: 1.83 MG/DL — HIGH (ref 0.5–1.3)
EOSINOPHIL # BLD AUTO: 0.16 K/UL — SIGNIFICANT CHANGE UP (ref 0–0.5)
EOSINOPHIL NFR BLD AUTO: 2.4 % — SIGNIFICANT CHANGE UP (ref 0–6)
GLUCOSE SERPL-MCNC: 133 MG/DL — HIGH (ref 70–99)
HCT VFR BLD CALC: 37.7 % — LOW (ref 39–50)
HGB BLD-MCNC: 11.9 G/DL — LOW (ref 13–17)
IMM GRANULOCYTES NFR BLD AUTO: 0.3 % — SIGNIFICANT CHANGE UP (ref 0–1.5)
INR BLD: 2.72 RATIO — HIGH (ref 0.88–1.16)
LACTATE SERPL-SCNC: 1.3 MMOL/L — SIGNIFICANT CHANGE UP (ref 0.7–2)
LYMPHOCYTES # BLD AUTO: 1 K/UL — SIGNIFICANT CHANGE UP (ref 1–3.3)
LYMPHOCYTES # BLD AUTO: 14.9 % — SIGNIFICANT CHANGE UP (ref 13–44)
MCHC RBC-ENTMCNC: 30 PG — SIGNIFICANT CHANGE UP (ref 27–34)
MCHC RBC-ENTMCNC: 31.6 GM/DL — LOW (ref 32–36)
MCV RBC AUTO: 95 FL — SIGNIFICANT CHANGE UP (ref 80–100)
MONOCYTES # BLD AUTO: 0.63 K/UL — SIGNIFICANT CHANGE UP (ref 0–0.9)
MONOCYTES NFR BLD AUTO: 9.4 % — SIGNIFICANT CHANGE UP (ref 2–14)
NEUTROPHILS # BLD AUTO: 4.89 K/UL — SIGNIFICANT CHANGE UP (ref 1.8–7.4)
NEUTROPHILS NFR BLD AUTO: 72.7 % — SIGNIFICANT CHANGE UP (ref 43–77)
NRBC # BLD: 0 /100 WBCS — SIGNIFICANT CHANGE UP (ref 0–0)
PLATELET # BLD AUTO: 164 K/UL — SIGNIFICANT CHANGE UP (ref 150–400)
POTASSIUM SERPL-MCNC: 4.1 MMOL/L — SIGNIFICANT CHANGE UP (ref 3.5–5.3)
POTASSIUM SERPL-SCNC: 4.1 MMOL/L — SIGNIFICANT CHANGE UP (ref 3.5–5.3)
PROT SERPL-MCNC: 6.7 GM/DL — SIGNIFICANT CHANGE UP (ref 6–8.3)
PROTHROM AB SERPL-ACNC: 31.1 SEC — HIGH (ref 10–12.9)
RBC # BLD: 3.97 M/UL — LOW (ref 4.2–5.8)
RBC # FLD: 16.6 % — HIGH (ref 10.3–14.5)
SODIUM SERPL-SCNC: 145 MMOL/L — SIGNIFICANT CHANGE UP (ref 135–145)
WBC # BLD: 6.72 K/UL — SIGNIFICANT CHANGE UP (ref 3.8–10.5)
WBC # FLD AUTO: 6.72 K/UL — SIGNIFICANT CHANGE UP (ref 3.8–10.5)

## 2019-02-22 PROCEDURE — 93970 EXTREMITY STUDY: CPT | Mod: 26

## 2019-02-22 PROCEDURE — 99284 EMERGENCY DEPT VISIT MOD MDM: CPT | Mod: 25

## 2019-02-22 RX ADMIN — Medication 100 MILLIGRAM(S): at 20:05

## 2019-02-22 NOTE — ED STATDOCS - PROGRESS NOTE DETAILS
signed Rosy Marcelo PA-C Pt seen initially in intake by Dr Vinicius Brooks.   88M BIB family for eval of lower extremity redness noticed by his home nurse. Pt normally has swollen, discolored legs which need daily wrapping or else they fill with fluid and blisters, he sees Dr Maharaj. Pt feeling well, has no pain, fevers or any new symptoms. Family notes that both legs are usually swollen and the left leg looks much less swollen than previously but both legs look a little bit more red than usual (erythema is proximal to pts chronic discoloration from venous stasis). Pt normally uses a walker. PMH CKD, HTN, HLD, Afib on coumadin, PVD. PMD Danitza Nunn, nephdharmesh Thomas. Plan labs, sono, abx. Pt and family agree with plan of  care. signed Rosy Marcelo PA-C   No significant findings on labwork or imaging, lab results similar to prior results in 2017. No significant findings on sono, which was limited, though relatively low suspicion for DVT in this adequately anticoagulated individual. Will tx for cellulitis, start on PO Clinda, outpt f/u vasc jazzmine 1-2 days. return precautions given. Pt also instructed to have INR checked within a few days as abx can potentially interact. pt given copy of labwork and imaging results. return precautions given. Pt feeling well, pt and family agree with DC and plan of care.

## 2019-02-22 NOTE — ED ADULT TRIAGE NOTE - CHIEF COMPLAINT QUOTE
Pt brought in by family, sent in by home care RN for bilateral leg cellulitis. Pt denies fever chills or pain at this time. Swelling and redness noted to both legs. Denies chest pain or SOB

## 2019-02-22 NOTE — ED STATDOCS - OBJECTIVE STATEMENT
89 y/o male with a PMHx of pacemaker, neuropathy, PVD, Afib on Coumadin, HTN, HLD presents to the ED c/o redness of lower extremities. Pt notes redness and swelling of legs at baseline but today, nurse noted worsening redness. Family states swelling has improved. Denies fevers, CP, or NV. Pt also reports chronic SOB, not currently exacerbated. Pt ambulates with walker. On water pill. Former smoker. No ETOH use. Vascular Dr. Phillips. PCP Dr. Ball. Cardio Dr. Mansfield

## 2019-02-22 NOTE — ED STATDOCS - PMH
Afib    HLD (hyperlipidemia)    HTN (hypertension)    Neuropathy    Pacemaker    PVD (peripheral vascular disease)

## 2019-02-22 NOTE — ED STATDOCS - NS ED ROS FT
Constitutional: No fever or chills  Eyes: No visual changes  HEENT: No throat pain  CV: No chest pain  Resp: No SOB no cough  GI: No abd pain, nausea or vomiting  : No dysuria  MSK: No musculoskeletal pain  Skin: No rash +redness of lower extremities  Neuro: No headache

## 2019-02-22 NOTE — ED STATDOCS - NS_ ATTENDINGSCRIBEDETAILS _ED_A_ED_FT
I, Vinicius Brooks MD,  performed the initial face to face bedside interview with this patient regarding history of present illness, review of symptoms and relevant past medical, social and family history.  I completed an independent physical examination.  I was the initial provider who evaluated this patient.  The history, relevant review of systems, past medical and surgical history, medical decision making, and physical examination was documented by the scribe in my presence and I attest to the accuracy of the documentation.

## 2019-02-22 NOTE — ED STATDOCS - PHYSICAL EXAMINATION
Constitutional: mild distress AAOx3  Eyes: PERRLA EOMI  Head: Normocephalic atraumatic  Mouth: MMM  Cardiac: regular rate   Resp: Lungs CTAB  GI: Abd s/nt/nd  Neuro: CN2-12 intact  Skin: No rashes +significant b/l LE venous-stasis with erythema. no warmth or crepitus. Normal peripheral pulses and soft compartments. Constitutional: NAD  Eyes: PERRLA EOMI  Head: Normocephalic atraumatic  Mouth: MMM  Cardiac: regular rate   Resp: Lungs CTAB  GI: Abd s/nt/nd  Neuro: CN2-12 intact  Skin:  +significant b/l LE venous-stasis with erythema. no warmth or crepitus. Normal peripheral pulses and soft compartments.

## 2019-02-22 NOTE — ED STATDOCS - CLINICAL SUMMARY MEDICAL DECISION MAKING FREE TEXT BOX
89 y/o male hx of Afib on Comaudin HTN, HLD presents to the ED for b/l LE redness, pt has home wound care nurse who unwrapped legs today and noticed worsening redness so sent pt to ED. Now pt has no pain or discomfort, family states legs look slightly more red. Exam with significant b/l LE venous-stasis with erythema but no warmth or crepitus and normal peripheral pulses and soft compartments. Concern for cellutlitis, will get labs, US, abx and reassess.

## 2019-05-14 NOTE — ED ADULT TRIAGE NOTE - DIRECT TO ROOM CARE INITIATED:
Subjective:      Patient ID: Berenice Hayes is a 57 y.o. female.    Chief Complaint: Follow-up    HPI:  HPI   Redness of hand bilaterally from the MCP to finger tip. Biopsy : pernio but unable to take the BP med and instead was taken nicotinamide  Hypothyroidism followed by  Dr. Espinoza  Abdomen: brown patch and Dr. Landrum sent a message to Dr. Espinoza  Patient had more swelling and with this more difficulty urinating    Patient continues on plaquenil uncertain: 6 weeks into the med had 2 good weekends and then a crash again    Biopsy Thyroid: 4/17/2019: inadequate  Finger Biopsy  FINAL PATHOLOGIC DIAGNOSIS  [place] FINAL DIAGNOSIS:  RIGHT THIRD FINGER, SKIN, [id number], 3/8/2019:  -Superficial dermal telangiectasias with mild mostly lymphocytic perivascular inflammation.  Comment:  Multiple tissue levels examined. PAS-D stain highlights basement membrane zone and vessels of unremarkable  thickness. The histopathologic features are nonspecific but somewhat suggestive of pernio. Clinical correlation is  recommended to determine need for additional biopsy if the problem persists without clinical diagnosis.    Patient had a visit with Vascular: Dr. Keenan    Patient Active Problem List   Diagnosis    Fibromyalgia    Bladder spasm    Osteoporosis, postmenopausal    Fibrocystic breast changes    Vulvar vestibulodynia    Vulvar pain    Vascular disorder: Erytnromelalgia    Raynaud's disease    Incomplete defecation    Straining during bowel movements    Chronic constipation    Rectocele    Disorder of muscle    Anxiety    Bilateral hand pain    Fatigue    Perimenopause vs Memopause    Menopause syndrome    Pudendal neuralgia    Pelvic pain in female    Urinary hesitancy    Hypothyroidism due to Hashimoto's thyroiditis    Multinodular goiter    Encounter for herb and vitamin supplement management    Gluten free diet    Family history of ischemic heart disease (IHD)    Cystocele, midline    Estefani  syndrome    Chronic pain syndrome    Trigeminal neuralgia    Hip pain, right    Erythema of finger    Erythromelalgia    Sjogren's syndrome    Pernio    Rash     Past Medical History:   Diagnosis Date    Asthma with allergic rhinitis     Bladder spasm     Dense breasts     heterogeneously/fibrocystic disease    Elevated antinuclear antibody (GUICHO) level     Endometriosis of cervix     Erythromelalgia     Fibromyalgia     Ganglion cyst     left toe    Goiter     MNG    Hashimoto's disease     Hashimoto's thyroiditis     Headache(784.0)     Hypothyroidism     Hashimoto with + Tg ab    Osteoporosis     femoral neck    Raynaud's phenomenon     Rhinitis     Scoliosis     Sleep disorder     type of Narcolepsy ( resolved)    Vitamin D deficiency disease     Vulvodynia      Past Surgical History:   Procedure Laterality Date    BLOCK-NERVE-PUDENDAL Bilateral 10/26/2017    [facility]    BLOCK-NERVE-PUDENDAL Right 10/28/2015    [facility]    BLOCK-NERVE-PUDENDAL Right 2015    [facility]    BLOCK-NERVE-PUDENDAL Left 2/3/2015    [facility]    BREAST SURGERY      fibrocystic tumor removed     SECTION      2x    CYST REMOVAL      laparoscopic cyst on ovary    HYSTERECTOMY      intradermal cyst       removed from left index finger    SINUS SURGERY      2x     Family History   Problem Relation Age of Onset    Diabetes Mother     Fibromyalgia Mother     Polymyalgia rheumatica Mother     Macular degeneration Mother     Arthritis Mother     Hypertension Mother     Kidney disease Mother     Pneumonia Mother     Adrenal disorder Mother         adrenal insufficiency    Coronary artery disease Father     Arthritis Father         osteoarthritis    Hypertension Father     Heart disease Father     Diabetes Father      "Hyperlipidemia Father     Hyperlipidemia Brother     Cancer Brother         oral    Thyroid cancer Daughter     Cancer Daughter         thyroid    Hypothyroidism Daughter     Breast cancer Neg Hx     Ovarian cancer Neg Hx     Colon cancer Neg Hx     Melanoma Neg Hx      Review of Systems:  Exhaustion, in conjunction with previous complaints  Objective:     Vitals:    05/14/19 0844   BP: 98/60   Pulse: 97   SpO2: 98%   Weight: 41.9 kg (92 lb 6 oz)   Height: 5' 1" (1.549 m)   PainSc:   4     Body mass index is 17.45 kg/m².  Physical Exam   Constitutional: She is oriented to person, place, and time. No distress.   Patient is quite thin   Neck: Carotid bruit is not present. No thyromegaly present.   Cardiovascular: Normal rate, regular rhythm and normal heart sounds. PMI is not displaced.   Pulmonary/Chest: Effort normal and breath sounds normal. No respiratory distress.   Abdominal: Soft. Bowel sounds are normal. She exhibits no distension. There is no tenderness.   Musculoskeletal:   Patient has intermittent edema   Neurological: She is alert and oriented to person, place, and time.     Assessment:     1. Fatigue, unspecified type      Plan:   Berenice was seen today for follow-up.    Diagnoses and all orders for this visit:    Fatigue, unspecified type:  This time the patient has been scheduled to see her rheumatologist as well as her endocrinologist I will see her again in 6 months for a general evaluation.  I had little to contribute to her overall evaluations today        Problem List Items Addressed This Visit     Fatigue - Primary        No orders of the defined types were placed in this encounter.    Follow up in about 6 months (around 11/4/2019) for Annual exam.     Medication List           Accurate as of 5/14/19  6:10 PM. If you have any questions, ask your nurse or doctor.               CONTINUE taking these medications    albuterol 90 mcg/actuation inhaler  Commonly known as:  VENTOLIN HFA  Inhale 2 " puffs into the lungs every 6 (six) hours as needed for Wheezing. Rescue     * amitriptyline 10 MG tablet  Commonly known as:  ELAVIL  Take 1 tablet (10 mg total) by mouth once daily.     * amitriptyline 50 MG tablet  Commonly known as:  ELAVIL  TAKE 1 TABLET NIGHTLY WITH THE 10 MG AMITRIPTYLINE     aspirin 81 MG EC tablet  Commonly known as:  ECOTRIN     DULoxetine 20 MG capsule  Commonly known as:  CYMBALTA  Take 1 capsule (20 mg total) by mouth once daily.     fluticasone propionate 50 mcg/actuation nasal spray  Commonly known as:  FLONASE  1 spray by Each Nare route once daily.     gabapentin 100 MG capsule  Commonly known as:  NEURONTIN  TAKE 1 CAPSULE IN THE MORNING, 1 CAPSULE AT NOON, 1 CAPSULE AT 5 P.M. AND 4 CAPSULES AT BEDTIME     hydroxychloroquine 200 mg tablet  Commonly known as:  PLAQUENIL     lidocaine-prilocaine cream  Commonly known as:  EMLA  USE AS DIRECTED BY PRESCRIBER OR PACKAGE INSTRUCTIONS     liothyronine 5 MCG Tab  Commonly known as:  CYTOMEL  Take 3.5 tablets (17.5 mcg) once daily, Brand name medically  necessary     meloxicam 7.5 MG tablet  Commonly known as:  MOBIC  TAKE 1 TABLET (7.5 MG TOTAL) BY MOUTH ONCE DAILY. FOR INFLAMMATION     metaxalone 800 MG tablet  Commonly known as:  SKELAXIN  TAKE 1 TABLET EVERY EVENING (SPASM)     MUCINEX 600 mg 12 hr tablet  Generic drug:  guaiFENesin     multivitamin capsule     mupirocin 2 % ointment  Commonly known as:  BACTROBAN  Apply to affected area 3 times daily     niacinamide 500 mg Tab  Take 1 tablet (500 mg total) by mouth 3 (three) times daily.     pilocarpine 5 MG Tab  Commonly known as:  SALAGEN  Take 1 tablet (5 mg total) by mouth 3 (three) times daily as needed.     PREVIDENT 5000 DRY MOUTH 1.1 % Gel  Generic drug:  fluoride (sodium)     RESTASIS 0.05 % ophthalmic emulsion  Generic drug:  cycloSPORINE     risedronate 35 mg Tbec  TAKE 1 TABLET ONCE A WEEK     triamcinolone acetonide 0.1% 0.1 % ointment  Commonly known as:  KENALOG  AAA BID  x 1-2 wks then prn flares only     VITAMIN D3 1,000 unit capsule  Generic drug:  cholecalciferol (vitamin D3)     * YUVAFEM 10 mcg Tab  Generic drug:  estradiol  INSERT 1 TABLET VAGINALLY TWICE WEEKLY     * estradiol 10 mcg Tab  Commonly known as:  VAGIFEM  Place 1 tablet (10 mcg total) vaginally twice a week.     * estradiol 0.05 mg/24 hr td ptsw 0.05 mg/24 hr  Commonly known as:  VIVELLE-DOT  Place 1 patch onto the skin twice a week.     zolpidem 10 mg Tab  Commonly known as:  AMBIEN  TAKE 1 TABLET BY MOUTH AT BEDTIME AS NEEDED         * This list has 5 medication(s) that are the same as other medications prescribed for you. Read the directions carefully, and ask your doctor or other care provider to review them with you.                 14-Dec-2017 18:11

## 2019-06-20 RX ORDER — FLUCONAZOLE 150 MG/1
1 TABLET ORAL
Qty: 0 | Refills: 0 | DISCHARGE
Start: 2019-06-20

## 2019-06-24 ENCOUNTER — INPATIENT (INPATIENT)
Facility: HOSPITAL | Age: 84
LOS: 7 days | Discharge: NURSING, REHAB OR LONG-TERM CARE | End: 2019-07-02
Attending: HOSPITALIST | Admitting: HOSPITALIST
Payer: MEDICARE

## 2019-06-24 VITALS
TEMPERATURE: 99 F | SYSTOLIC BLOOD PRESSURE: 121 MMHG | WEIGHT: 225.09 LBS | DIASTOLIC BLOOD PRESSURE: 54 MMHG | HEART RATE: 101 BPM | RESPIRATION RATE: 20 BRPM | HEIGHT: 67 IN | OXYGEN SATURATION: 100 %

## 2019-06-24 DIAGNOSIS — Z96.643 PRESENCE OF ARTIFICIAL HIP JOINT, BILATERAL: Chronic | ICD-10-CM

## 2019-06-24 DIAGNOSIS — Z90.49 ACQUIRED ABSENCE OF OTHER SPECIFIED PARTS OF DIGESTIVE TRACT: Chronic | ICD-10-CM

## 2019-06-24 PROBLEM — I48.91 UNSPECIFIED ATRIAL FIBRILLATION: Chronic | Status: ACTIVE | Noted: 2019-02-22

## 2019-06-24 PROBLEM — I10 ESSENTIAL (PRIMARY) HYPERTENSION: Chronic | Status: ACTIVE | Noted: 2019-02-22

## 2019-06-24 PROBLEM — E78.5 HYPERLIPIDEMIA, UNSPECIFIED: Chronic | Status: ACTIVE | Noted: 2019-02-22

## 2019-06-24 LAB
ALBUMIN SERPL ELPH-MCNC: 2.8 G/DL — LOW (ref 3.3–5)
ALP SERPL-CCNC: 89 U/L — SIGNIFICANT CHANGE UP (ref 40–120)
ALT FLD-CCNC: 23 U/L — SIGNIFICANT CHANGE UP (ref 12–78)
ANION GAP SERPL CALC-SCNC: 9 MMOL/L — SIGNIFICANT CHANGE UP (ref 5–17)
APPEARANCE UR: CLEAR — SIGNIFICANT CHANGE UP
APTT BLD: 28.9 SEC — SIGNIFICANT CHANGE UP (ref 27.5–36.3)
AST SERPL-CCNC: 20 U/L — SIGNIFICANT CHANGE UP (ref 15–37)
BACTERIA # UR AUTO: ABNORMAL
BASOPHILS # BLD AUTO: 0.03 K/UL — SIGNIFICANT CHANGE UP (ref 0–0.2)
BASOPHILS NFR BLD AUTO: 0.2 % — SIGNIFICANT CHANGE UP (ref 0–2)
BILIRUB SERPL-MCNC: 0.9 MG/DL — SIGNIFICANT CHANGE UP (ref 0.2–1.2)
BILIRUB UR-MCNC: NEGATIVE — SIGNIFICANT CHANGE UP
BUN SERPL-MCNC: 34 MG/DL — HIGH (ref 7–23)
CALCIUM SERPL-MCNC: 8.5 MG/DL — SIGNIFICANT CHANGE UP (ref 8.5–10.1)
CHLORIDE SERPL-SCNC: 101 MMOL/L — SIGNIFICANT CHANGE UP (ref 96–108)
CO2 SERPL-SCNC: 26 MMOL/L — SIGNIFICANT CHANGE UP (ref 22–31)
COLOR SPEC: YELLOW — SIGNIFICANT CHANGE UP
CREAT SERPL-MCNC: 2.07 MG/DL — HIGH (ref 0.5–1.3)
DIFF PNL FLD: NEGATIVE — SIGNIFICANT CHANGE UP
EOSINOPHIL # BLD AUTO: 0.01 K/UL — SIGNIFICANT CHANGE UP (ref 0–0.5)
EOSINOPHIL NFR BLD AUTO: 0.1 % — SIGNIFICANT CHANGE UP (ref 0–6)
EPI CELLS # UR: SIGNIFICANT CHANGE UP
GLUCOSE SERPL-MCNC: 94 MG/DL — SIGNIFICANT CHANGE UP (ref 70–99)
GLUCOSE UR QL: NEGATIVE MG/DL — SIGNIFICANT CHANGE UP
HCT VFR BLD CALC: 32.7 % — LOW (ref 39–50)
HGB BLD-MCNC: 10.2 G/DL — LOW (ref 13–17)
HYALINE CASTS # UR AUTO: ABNORMAL /LPF
IMM GRANULOCYTES NFR BLD AUTO: 0.9 % — SIGNIFICANT CHANGE UP (ref 0–1.5)
INR BLD: 1.46 RATIO — HIGH (ref 0.88–1.16)
KETONES UR-MCNC: NEGATIVE — SIGNIFICANT CHANGE UP
LACTATE SERPL-SCNC: 1.3 MMOL/L — SIGNIFICANT CHANGE UP (ref 0.7–2)
LEUKOCYTE ESTERASE UR-ACNC: ABNORMAL
LYMPHOCYTES # BLD AUTO: 1.05 K/UL — SIGNIFICANT CHANGE UP (ref 1–3.3)
LYMPHOCYTES # BLD AUTO: 5.3 % — LOW (ref 13–44)
MCHC RBC-ENTMCNC: 28.3 PG — SIGNIFICANT CHANGE UP (ref 27–34)
MCHC RBC-ENTMCNC: 31.2 GM/DL — LOW (ref 32–36)
MCV RBC AUTO: 90.6 FL — SIGNIFICANT CHANGE UP (ref 80–100)
MONOCYTES # BLD AUTO: 1.18 K/UL — HIGH (ref 0–0.9)
MONOCYTES NFR BLD AUTO: 6 % — SIGNIFICANT CHANGE UP (ref 2–14)
NEUTROPHILS # BLD AUTO: 17.26 K/UL — HIGH (ref 1.8–7.4)
NEUTROPHILS NFR BLD AUTO: 87.5 % — HIGH (ref 43–77)
NITRITE UR-MCNC: NEGATIVE — SIGNIFICANT CHANGE UP
PH UR: 5 — SIGNIFICANT CHANGE UP (ref 5–8)
PLATELET # BLD AUTO: 174 K/UL — SIGNIFICANT CHANGE UP (ref 150–400)
POTASSIUM SERPL-MCNC: 3.8 MMOL/L — SIGNIFICANT CHANGE UP (ref 3.5–5.3)
POTASSIUM SERPL-SCNC: 3.8 MMOL/L — SIGNIFICANT CHANGE UP (ref 3.5–5.3)
PROT SERPL-MCNC: 6 GM/DL — SIGNIFICANT CHANGE UP (ref 6–8.3)
PROT UR-MCNC: NEGATIVE MG/DL — SIGNIFICANT CHANGE UP
PROTHROM AB SERPL-ACNC: 16.4 SEC — HIGH (ref 10–12.9)
RBC # BLD: 3.61 M/UL — LOW (ref 4.2–5.8)
RBC # FLD: 16.8 % — HIGH (ref 10.3–14.5)
RBC CASTS # UR COMP ASSIST: NEGATIVE /HPF — SIGNIFICANT CHANGE UP (ref 0–4)
SODIUM SERPL-SCNC: 136 MMOL/L — SIGNIFICANT CHANGE UP (ref 135–145)
SP GR SPEC: 1.01 — SIGNIFICANT CHANGE UP (ref 1.01–1.02)
UROBILINOGEN FLD QL: NEGATIVE MG/DL — SIGNIFICANT CHANGE UP
WBC # BLD: 19.7 K/UL — HIGH (ref 3.8–10.5)
WBC # FLD AUTO: 19.7 K/UL — HIGH (ref 3.8–10.5)
WBC UR QL: ABNORMAL

## 2019-06-24 PROCEDURE — 72170 X-RAY EXAM OF PELVIS: CPT | Mod: 26

## 2019-06-24 PROCEDURE — 99285 EMERGENCY DEPT VISIT HI MDM: CPT

## 2019-06-24 PROCEDURE — 71045 X-RAY EXAM CHEST 1 VIEW: CPT | Mod: 26

## 2019-06-24 RX ORDER — METOPROLOL TARTRATE 50 MG
25 TABLET ORAL
Refills: 0 | Status: DISCONTINUED | OUTPATIENT
Start: 2019-06-24 | End: 2019-07-02

## 2019-06-24 RX ORDER — LEVOTHYROXINE SODIUM 125 MCG
125 TABLET ORAL DAILY
Refills: 0 | Status: DISCONTINUED | OUTPATIENT
Start: 2019-06-24 | End: 2019-07-02

## 2019-06-24 RX ORDER — ACETAMINOPHEN 500 MG
650 TABLET ORAL EVERY 6 HOURS
Refills: 0 | Status: DISCONTINUED | OUTPATIENT
Start: 2019-06-24 | End: 2019-07-02

## 2019-06-24 RX ORDER — AMIODARONE HYDROCHLORIDE 400 MG/1
200 TABLET ORAL DAILY
Refills: 0 | Status: DISCONTINUED | OUTPATIENT
Start: 2019-06-24 | End: 2019-07-02

## 2019-06-24 RX ORDER — ATORVASTATIN CALCIUM 80 MG/1
20 TABLET, FILM COATED ORAL AT BEDTIME
Refills: 0 | Status: DISCONTINUED | OUTPATIENT
Start: 2019-06-24 | End: 2019-07-02

## 2019-06-24 RX ORDER — CEFEPIME 1 G/1
2000 INJECTION, POWDER, FOR SOLUTION INTRAMUSCULAR; INTRAVENOUS ONCE
Refills: 0 | Status: COMPLETED | OUTPATIENT
Start: 2019-06-24 | End: 2019-06-24

## 2019-06-24 RX ORDER — WARFARIN SODIUM 2.5 MG/1
1 TABLET ORAL
Qty: 0 | Refills: 0 | DISCHARGE

## 2019-06-24 RX ORDER — SODIUM CHLORIDE 9 MG/ML
3200 INJECTION INTRAMUSCULAR; INTRAVENOUS; SUBCUTANEOUS ONCE
Refills: 0 | Status: COMPLETED | OUTPATIENT
Start: 2019-06-24 | End: 2019-06-24

## 2019-06-24 RX ORDER — VANCOMYCIN HCL 1 G
125 VIAL (EA) INTRAVENOUS EVERY 6 HOURS
Refills: 0 | Status: DISCONTINUED | OUTPATIENT
Start: 2019-06-24 | End: 2019-07-02

## 2019-06-24 RX ORDER — FOLIC ACID 0.8 MG
1 TABLET ORAL DAILY
Refills: 0 | Status: DISCONTINUED | OUTPATIENT
Start: 2019-06-24 | End: 2019-07-02

## 2019-06-24 RX ORDER — OXYCODONE HYDROCHLORIDE 5 MG/1
1 TABLET ORAL
Qty: 0 | Refills: 0 | DISCHARGE

## 2019-06-24 RX ORDER — SODIUM CHLORIDE 9 MG/ML
1000 INJECTION INTRAMUSCULAR; INTRAVENOUS; SUBCUTANEOUS
Refills: 0 | Status: DISCONTINUED | OUTPATIENT
Start: 2019-06-24 | End: 2019-06-26

## 2019-06-24 RX ORDER — VANCOMYCIN HCL 1 G
1500 VIAL (EA) INTRAVENOUS ONCE
Refills: 0 | Status: COMPLETED | OUTPATIENT
Start: 2019-06-24 | End: 2019-06-24

## 2019-06-24 RX ORDER — WARFARIN SODIUM 2.5 MG/1
2 TABLET ORAL DAILY
Refills: 0 | Status: DISCONTINUED | OUTPATIENT
Start: 2019-06-24 | End: 2019-06-27

## 2019-06-24 RX ORDER — ACETAMINOPHEN 500 MG
650 TABLET ORAL ONCE
Refills: 0 | Status: COMPLETED | OUTPATIENT
Start: 2019-06-24 | End: 2019-06-24

## 2019-06-24 RX ORDER — ALLOPURINOL 300 MG
300 TABLET ORAL DAILY
Refills: 0 | Status: DISCONTINUED | OUTPATIENT
Start: 2019-06-24 | End: 2019-07-02

## 2019-06-24 RX ADMIN — ATORVASTATIN CALCIUM 20 MILLIGRAM(S): 80 TABLET, FILM COATED ORAL at 23:10

## 2019-06-24 RX ADMIN — CEFEPIME 2000 MILLIGRAM(S): 1 INJECTION, POWDER, FOR SOLUTION INTRAMUSCULAR; INTRAVENOUS at 16:59

## 2019-06-24 RX ADMIN — SODIUM CHLORIDE 3200 MILLILITER(S): 9 INJECTION INTRAMUSCULAR; INTRAVENOUS; SUBCUTANEOUS at 15:10

## 2019-06-24 RX ADMIN — Medication 650 MILLIGRAM(S): at 15:10

## 2019-06-24 RX ADMIN — Medication 25 MILLIGRAM(S): at 23:10

## 2019-06-24 RX ADMIN — CEFEPIME 100 MILLIGRAM(S): 1 INJECTION, POWDER, FOR SOLUTION INTRAMUSCULAR; INTRAVENOUS at 15:55

## 2019-06-24 RX ADMIN — SODIUM CHLORIDE 3200 MILLILITER(S): 9 INJECTION INTRAMUSCULAR; INTRAVENOUS; SUBCUTANEOUS at 16:59

## 2019-06-24 RX ADMIN — Medication 250 MILLIGRAM(S): at 16:59

## 2019-06-24 NOTE — ED PROVIDER NOTE - CHPI ED SYMPTOMS NEG
Physical Therapy Note Chart reviewed. Spoke with nursing. Patient currently receiving dialysis and then with plans for colonoscopy following dialysis. RN recommending PT defer as patient will unavailable for PT intervention. Will defer and continue to follow.  
 
Thank you, 
Shiv Ramos, PT, DPT 
 no tingling

## 2019-06-24 NOTE — ED ADULT TRIAGE NOTE - CHIEF COMPLAINT QUOTE
Pt BIBEMS for fall on coumadin last night with right elbow pain and per EMS family reported [facility] at home of 102. Trauma alert and Code Sepsis initiated on arrival.

## 2019-06-24 NOTE — ED ADULT NURSE NOTE - OBJECTIVE STATEMENT
Pt states "I went to the bathroom and felt weak and just slid off the toilet", denies hitting head, denies LOC, pt on coumadin, c/o pain to left knee upon movement, c/o pain/burning upon urination with increased weakness over the last few days Pt states "I went to the bathroom and felt weak and just slid off the toilet", denies hitting head, denies LOC, pt on coumadin, c/o pain to left knee upon movement, c/o pain/burning upon urination with increased weakness over the last few days, pt recently treated for c-diff and c/o three episodes of diarrhea this morning

## 2019-06-24 NOTE — H&P ADULT - ASSESSMENT
88 yrs M  PMHx of A-fib on Coumadin s/p pacemaker, HTN, HLD, CKD 4 bought to the ED by the family with the CC of the Fall last night .    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 secondary to  -Patient with T max -101.7 /RR -22 /BP-98/42 and WBC-19.70   -Admit to Medsurg  -In the ED received Vancomycin and cefepime   -In the ED 3.2 liter bolus  -continue Gentle fluids   -UA :LE small ,bacteria occasional and WBC 6-10  -Lactate 1.3  -CXR :no acute pathology or PNA  -Blood cultures x 2 follow up   -check C diff for PCR and GI PCR  -Trend WBC      #Weakness likely secondary to Deconditioning and or Sepsis and or Diarrhea  -Physical therapy      # Atrial fibrillation   - [id number] 4 with PPM  - INR subtherapeutic   - continue home medication     #Fall  -follow up X ray pelvis       # FRANCINE on CKD   -Likely secondary to the Prerenal   -continue Gentle fluids   -monitor renal function  -No NSAIDs  -Baseline 1.6-1.8    # HTN  -DASH diet      #HLD  -continue home medication      #DVT prophylaxis  -on coumadin    #Advance Directives 88 yrs M  PMHx of A-fib on Coumadin s/p pacemaker, HTN, HLD, CKD 4 bought to the ED by the family with the CC of the Fall last night .    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 secondary to Diarrhoea likely secondary to the C diff   -With history of recent C diff infection   -Patient with T max -101.7 /RR -22 /BP-98/42 and WBC-19.70   -Admit to Med surg  -In the ED received Vancomycin and cefepime   -In the ED 3.2 liter bolus  -will add PO vancomycin empirically treat the C diff  -ID consult   -continue Gentle fluids reassess in the AM need for the fluids   -UA :LE small ,bacteria occasional and WBC 6-10 -Patient denies symptoms of UTI  -Lactate 1.3  -CXR :no acute pathology or PNA- Denies cough   -Blood cultures x 2 follow up   -check C diff for PCR and GI PCR  -Trend WBC      # Atrial fibrillation   - [id number] 4 with PPM  - INR subtherapeutic   - continue home medication     #Mechanical Fall-No head trauma .  -follow up X ray pelvis and X ray Knee left   -Physical therapy     # FRANCINE on CKD   -Likely secondary to the Prerenal   -continue Gentle fluids   -monitor renal function  -No NSAIDs  -Baseline 1.6-1.8      #Weakness likely secondary to Deconditioning and or Sepsis and or Diarrhea  -Physical therapy      #HTN  -DASH diet  -will hold home medication for now  -Resume depending on his BP    #HLD  -continue home medication    #DVT prophylaxis  -On Coumadin  -IMPROVE VTE Individual Risk Assessment    RISK                                                                Points    [  ] Previous VTE                                                  3    [  ] Thrombophilia                                               2    [  ] Lower limb paralysis                                      2        (unable to hold up >15 seconds)      [  ] Current Cancer                                              2         (within 6 months)    [x  ] Immobilization > 24 hrs                                1    [  ] ICU/CCU stay > 24 hours                              1    [ x ] Age > 60                                                      1    IMPROVE VTE Score ____2_____    IMPROVE Score 0-1: Low Risk, No VTE prophylaxis required for most patients, encourage ambulation.   IMPROVE Score 2-3: At risk, pharmacologic VTE prophylaxis is indicated for most patients (in the absence of a contraindication)  IMPROVE Score > or = 4: High Risk, pharmacologic VTE prophylaxis is indicated for most patients (in the absence of a contraindication)    #Advance Directives  -Patient would like to daughter to make decision   -Son at the bedside they would like to discuss tomorrow with the daughter HCP -not liked to be called 88 yrs M  PMHx of A-fib on Coumadin s/p pacemaker, HTN, HLD, CKD 4 bought to the ED by the family with the CC of the Fall last night .    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 secondary to Diarrhoea to the C diff   -With history of recent C diff infection   -Patient with T max -101.7 /RR -22 /BP-98/42 and WBC-19.70   -Admit to Med surg  -In the ED received Vancomycin and cefepime   -In the ED 3.2 liter bolus  -will add PO vancomycin empirically treat the C diff  -ID consult   -continue Gentle fluids reassess in the AM need for the fluids   -UA :LE small ,bacteria occasional and WBC 6-10 -Patient denies symptoms of UTI  -Lactate 1.3  -CXR :no acute pathology or PNA- Denies cough   -Blood cultures x 2 follow up   -check C diff for PCR and GI PCR  -Trend WBC      # Atrial fibrillation   - [id number] 4 with PPM  - INR subtherapeutic   - continue home medication     #Mechanical Fall-No head trauma .  -follow up X ray pelvis and X ray Knee left   -Physical therapy     # FRANCINE on CKD   -Likely secondary to the Prerenal   -continue Gentle fluids   -monitor renal function  -No NSAIDs  -Baseline 1.6-1.8      #Weakness likely secondary to Deconditioning and or Sepsis and or Diarrhea  -Physical therapy      #HTN  -DASH diet  -will hold home medication for now  -Resume depending on his BP    #HLD  -continue home medication    #DVT prophylaxis  -On Coumadin  -IMPROVE VTE Individual Risk Assessment    RISK                                                                Points    [  ] Previous VTE                                                  3    [  ] Thrombophilia                                               2    [  ] Lower limb paralysis                                      2        (unable to hold up >15 seconds)      [  ] Current Cancer                                              2         (within 6 months)    [x  ] Immobilization > 24 hrs                                1    [  ] ICU/CCU stay > 24 hours                              1    [ x ] Age > 60                                                      1    IMPROVE VTE Score ____2_____    IMPROVE Score 0-1: Low Risk, No VTE prophylaxis required for most patients, encourage ambulation.   IMPROVE Score 2-3: At risk, pharmacologic VTE prophylaxis is indicated for most patients (in the absence of a contraindication)  IMPROVE Score > or = 4: High Risk, pharmacologic VTE prophylaxis is indicated for most patients (in the absence of a contraindication)    #Advance Directives  -Patient would like to daughter to make decision   -Son at the bedside they would like to discuss tomorrow with the daughter HCP -not liked to be called 88 yrs M  PMHx of A-fib on Coumadin s/p pacemaker, HTN, HLD , CKD  ,Recent C diff Infection presenting to the ED s/p fall last night ,fever and  Persistent diarrhoea  .     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 secondary to Diarrhoea - ?C diff   -With history of recent C diff infection   -Patient with T max -101.7 /RR -22 /BP-98/42 and WBC-19.70   -Admit to Med surg  -In the ED received Vancomycin and cefepime   -In the ED 3.2 liter bolus  -will add PO vancomycin empirically treat the C diff  -ID consult   -continue Gentle fluids reassess in the AM need for the fluids   -UA :LE small ,bacteria occasional and WBC 6-10 -Patient denies symptoms of UTI  -Lactate 1.3  -CXR :no acute pathology or PNA- Denies cough   -Blood cultures x 2 follow up   -check C diff for PCR and GI PCR  -Trend WBC      # Atrial fibrillation   - [id number] 4 with PPM  - INR subtherapeutic   - continue home medication including amiodarone ,Metoprolol and coumadin     #Mechanical Fall-No head trauma .  -follow up X ray pelvis and X ray Knee left   -Physical therapy     # FRANCINE on CKD   -Likely secondary to the Prerenal   -continue Gentle fluids   -monitor renal function  -No NSAIDs  -Baseline 1.6-1.8    #Hypothyroidism   -continue home med    #Gout  -continue Allopurinol       #Weakness likely secondary to Deconditioning and or Sepsis and or Diarrhea  -Physical therapy      #HTN  -DASH diet  -continue Metoprolol     #HLD  -continue home medication    #DVT prophylaxis  -On Coumadin  -IMPROVE VTE Individual Risk Assessment    RISK                                                                Points    [  ] Previous VTE                                                  3    [  ] Thrombophilia                                               2    [  ] Lower limb paralysis                                      2        (unable to hold up >15 seconds)      [  ] Current Cancer                                              2         (within 6 months)    [x  ] Immobilization > 24 hrs                                1    [  ] ICU/CCU stay > 24 hours                              1    [ x ] Age > 60                                                      1    IMPROVE VTE Score ____2_____    IMPROVE Score 0-1: Low Risk, No VTE prophylaxis required for most patients, encourage ambulation.   IMPROVE Score 2-3: At risk, pharmacologic VTE prophylaxis is indicated for most patients (in the absence of a contraindication)  IMPROVE Score > or = 4: High Risk, pharmacologic VTE prophylaxis is indicated for most patients (in the absence of a contraindication)    #Advance Directives  -Patient would like  daughter to make decision   -Son at the bedside they would like to discuss tomorrow with the daughter HCP -not liked to be called over phone. 88 yrs M  PMHx of A-fib on Coumadin s/p pacemaker, HTN, HLD , CKD  ,Recent C diff Infection presenting to the ED s/p fall last night ,fever and  Persistent diarrhoea  .     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 secondary to Diarrhoea - ?C diff   -With history of recent C diff infection   -Patient with T max -101.7 /RR -22 /BP-98/42 and WBC-19.70   -Admit to Med surg  -In the ED received Vancomycin and cefepime   -In the ED 3.2 liter bolus  -will add PO vancomycin empirically treat the C diff  -ID consult   -continue Gentle fluids reassess in the AM need for the fluids   -UA :LE small ,bacteria occasional and WBC 6-10 -Patient denies symptoms of UTI  -Lactate 1.3  -CXR :no acute pathology or PNA- Denies cough   -Blood cultures x 2 follow up   -check C diff for PCR and GI PCR  -Trend WBC      # Atrial fibrillation   - [id number] 4 with PPM  - INR subtherapeutic increase 2 mg coumadin ,home dose 1 mg daily   - continue home medication including amiodarone ,Metoprolol and coumadin     #Mechanical Fall-No head trauma .  -follow up X ray pelvis and X ray Knee left   -Physical therapy     # FRANCINE on CKD 3  -Likely secondary to the Prerenal   -continue Gentle fluids   -monitor renal function  -No NSAIDs  -Baseline 1.6-1.8    #Hypothyroidism   -continue home med    #Gout  -continue Allopurinol       #Weakness likely secondary to Deconditioning and or Sepsis and or Diarrhea  -Physical therapy      #HTN  -DASH diet  -continue Metoprolol     #HLD  -continue home medication    #DVT prophylaxis  -On Coumadin  -IMPROVE VTE Individual Risk Assessment    RISK                                                                Points    [  ] Previous VTE                                                  3    [  ] Thrombophilia                                               2    [  ] Lower limb paralysis                                      2        (unable to hold up >15 seconds)      [  ] Current Cancer                                              2         (within 6 months)    [x  ] Immobilization > 24 hrs                                1    [  ] ICU/CCU stay > 24 hours                              1    [ x ] Age > 60                                                      1    IMPROVE VTE Score ____2_____    IMPROVE Score 0-1: Low Risk, No VTE prophylaxis required for most patients, encourage ambulation.   IMPROVE Score 2-3: At risk, pharmacologic VTE prophylaxis is indicated for most patients (in the absence of a contraindication)  IMPROVE Score > or = 4: High Risk, pharmacologic VTE prophylaxis is indicated for most patients (in the absence of a contraindication)    #Advance Directives  -Full code   -Patient would like  daughter -HCP  to make decision   -Would like to discuss in person as per the son .

## 2019-06-24 NOTE — ED PROVIDER NOTE - OBJECTIVE STATEMENT
87 y/o M with a PMHx of A-fib on Coumadin s/p pacemaker, HTN, HLD, PVD presenting to the ED s/p fall last night. Pt in ED c/o L knee pain. Pt was using the bathroom and when he got up to walk away, his legs "felt like jelly" and he fell. Family helped pt off the ground and pt was able to ambulate with assistance. Pt states that he felt extremely weak. Daughter at bedside notes that pt got out of rehab a few weeks ago for UTI and PNA induced weakness. While pt was in rehab, he got cellulitis and C-diff and was placed on Vancomycin, which he since has finished. Pt states he is still having diarrhea, although it has improved. Pt had 3 episodes of diarrhea today; loose and watery. Pt also had a fever today. Tmax: 102F. +dysuria. +occasional EtOH use. Denies head strike, LOC, cough, abd pain, sore throat, tobacco use, illicit drug use, or ear pain. NKDA. PMD: Dr. Nunn. Cardiologist: Dr. Mansfield.

## 2019-06-24 NOTE — ED PROVIDER NOTE - SKIN, MLM
+L thigh erythema extending to groin. +3 areas of superficial ulceration with some scaliness of skin on RLE and R foot. +superficial ulceration on R anterior lateral calf. +4cm L shaped skin tear R elbow.

## 2019-06-24 NOTE — ED ADULT NURSE REASSESSMENT NOTE - NS ED NURSE REASSESS COMMENT FT1
Pt with bowel movement in bed, incontinence care provided, linens changed and pt repositioned for comfort, awaiting BW results and dispo, will continue to monitor for safety and comfort

## 2019-06-24 NOTE — ED ADULT NURSE NOTE - NSIMPLEMENTINTERV_GEN_ALL_ED
Implemented All Fall with Harm Risk Interventions:  [place] to call system. Call bell, personal items and telephone within reach. Instruct patient to call for assistance. Room bathroom lighting operational. Non-slip footwear when patient is off stretcher. Physically safe environment: no spills, clutter or unnecessary equipment. Stretcher in lowest position, wheels locked, appropriate side rails in place. Provide visual cue, wrist band, yellow gown, etc. Monitor gait and stability. Monitor for mental status changes and reorient to person, place, and time. Review medications for side effects contributing to fall risk. Reinforce activity limits and safety measures with patient and family. Provide visual clues: red socks.

## 2019-06-24 NOTE — H&P ADULT - NSICDXPASTMEDICALHX_GEN_ALL_CORE_FT
PAST MEDICAL HISTORY:  Afib     HLD (hyperlipidemia)     HTN (hypertension)     Neuropathy     Pacemaker     PVD (peripheral vascular disease) PAST MEDICAL HISTORY:  Afib     H/O Clostridium difficile infection     HLD (hyperlipidemia)     HTN (hypertension)     Neuropathy     Pacemaker     Pneumonia     PVD (peripheral vascular disease)

## 2019-06-24 NOTE — ED PROVIDER NOTE - CARE PLAN
Principal Discharge DX:	Sepsis, due to unspecified organism  Secondary Diagnosis:	Weakness  Secondary Diagnosis:	Diarrhea, unspecified type

## 2019-06-24 NOTE — ED ADULT NURSE REASSESSMENT NOTE - COMFORT CARE
assisted in cleaning and changing patient brief and sheets. patient is clean and dry at this time/repositioned/side rails up

## 2019-06-24 NOTE — H&P ADULT - NSHPPHYSICALEXAM_GEN_ALL_CORE
Vital Signs Last 24 Hrs  T(C): 37.1 (24 Jun 2019 20:17), Max: 39.2 (24 Jun 2019 15:05)  T(F): 98.8 (24 Jun 2019 20:17), Max: 102.6 (24 Jun 2019 15:05)  HR: 87 (24 Jun 2019 20:17) (60 - 101)  BP: 100/68 (24 Jun 2019 20:17) (71/55 - 121/54)  BP(mean): --  RR: 18 (24 Jun 2019 20:17) (17 - 22)  SpO2: 97% (24 Jun 2019 20:17) (94% - 100%)

## 2019-06-24 NOTE — H&P ADULT - NEUROLOGICAL DETAILS
alert and oriented x 3/responds to pain/responds to verbal commands/sensation intact/deep reflexes intact

## 2019-06-24 NOTE — H&P ADULT - MUSCULOSKELETAL
detailed exam no joint swelling/no joint erythema/no calf tenderness/decreased ROM due to pain/no joint warmth/Left knee decreased ROM and bruise in he left knee

## 2019-06-24 NOTE — ED ADULT NURSE REASSESSMENT NOTE - COMFORT CARE
assisting in cleaning and changing patients sheets and brief. patient is clean and dry at this time./repositioned/side rails up

## 2019-06-24 NOTE — H&P ADULT - RS GEN PE MLT RESP DETAILS PC
clear to auscultation bilaterally/normal/breath sounds equal/good air movement/airway patent/respirations non-labored

## 2019-06-24 NOTE — PHARMACOTHERAPY INTERVENTION NOTE - COMMENTS
med history complete, reviewed medications with patient and confirmed with doctor first med profile
dose adjusted to 1500mg according to pt's weight

## 2019-06-24 NOTE — H&P ADULT - NSHPSOCIALHISTORY_GEN_ALL_CORE
Lives with the family ,not a smoker or alcoholic Lives with the family  occasionally drinks alcohol ,not a smoker

## 2019-06-24 NOTE — ED PROVIDER NOTE - CLINICAL SUMMARY MEDICAL DECISION MAKING FREE TEXT BOX
Pt with h/o pacer, A-fib, recent UTI, C-diff recently discharged from rehab. Here with episode of weakness, fall last night, and diarrhea today with fever. R/o recurrent C-diff. Sepsis. Labs, fluids, abx, admit.

## 2019-06-24 NOTE — H&P ADULT - HISTORY OF PRESENT ILLNESS
88 yrs M  PMHx of A-fib on Coumadin s/p pacemaker, HTN, HLD , CKD 4 bought to the ED by the family with the CC of the Fall last night .   presenting to the ED s/p fall last night. Pt in ED c/o L knee pain. Pt was using the bathroom and when he got up to walk away, his legs "felt like jelly" and he fell. Family helped pt off the ground and pt was able to ambulate with assistance. Pt states that he felt extremely weak. Daughter at bedside notes that pt got out of rehab a few weeks ago for UTI and PNA induced weakness. While pt was in rehab, he got cellulitis and C-diff and was placed on Vancomycin, which he since has finished. Pt states he is still having diarrhea, although it has improved. Pt had 3 episodes of diarrhea today; loose and watery. Pt also had a fever today. Tmax: 102F. +dysuria. +occasional EtOH use. Denies head strike, LOC, cough, abd pain, sore throat, tobacco use, illicit drug use, or ear pain 88 yrs M  PMHx of A-fib on Coumadin s/p pacemaker, HTN, HLD , CKD  ,Recent C diff Infection presenting to the ED s/p fall last night ,fever and Persistent diarrhoea x 3 days .   Patient reports that he using the bathroom and when he got up to walk away, his legs felt weak and he slipped and fell hit his Left knee . Family helped pt off the ground and pt was able to ambulate with assistance. As per the patient he have been having diarrhoea for the past 3 days , about 3 episodes /day .Describes as he used a "stool softener "   He was admitted at [facility] for the Pneumonia and as per the chart note ?UTI in the month of May 2019 and discharged to the Rehab .While he was in the Rehab he had diagnosed with the C diff and was treated with the Vancomycin and in addition he had Cellulitis of face and leg .Today he had a fever of 102 F and was making no sense and family both him to the hospital .   Denies head trauma , Loss of consciousness , cough, sorethroat  ,URI symptoms , abd pain, blood in stools ,headache ,change  in the urination including dysuria .  Baseline he uses a walker ,has limited mobility for the past few years .    In ED patient had fever of 101.7 and received 3.2 liter fluid and vancomycin and Cefepime . 88 yrs M  PMHx of A-fib on Coumadin s/p pacemaker, HTN, HLD , CKD  ,Recent C diff Infection presenting to the ED s/p fall last night ,fever and  Persistent diarrhoea  .   Patient reports that he using the bathroom and when he got up to walk away, his legs felt weak and he slipped and fell hit his Left knee . Family helped pt off the ground and pt was able to ambulate with assistance. As per the patient he have been having diarrhoea for the past 3 days , about 3 episodes /day .Describes as he used a "stool softener "   He was admitted at [facility] for the Pneumonia and as per the chart note ?UTI in the month of May 2019 and discharged to the Rehab .While he was in the Rehab he had diagnosed with the C diff and was treated with the Vancomycin and in addition he had Cellulitis of face and leg .Today he had a fever of 102 F and was making no sense and family both him to the hospital .   Denies head trauma , Loss of consciousness , cough, sorethroat  ,URI symptoms , abd pain, blood in stools ,headache ,change  in the urination including dysuria .  Baseline he uses a walker ,has limited mobility for the past few years .    In ED patient had fever of 101.7 and received 3.2 liter fluid and vancomycin and Cefepime .

## 2019-06-24 NOTE — H&P ADULT - ATTENDING COMMENTS
Patient seen and examined with resident physician Louisa Crystal. I personally had a face-to-face encounter with the patient, examined the patient myself and reviewed the plan of care with the patient and Resident Louisa Crystal. I agree with the assessment and plan of Resident Louisa rCystal as stated and discussed.    This is a 88 y.o. male with PMH Afib on Coumadin, s/p pacemaker, HTN, HLD, CKD stage 3, recent C diff infection presenting to the ED s/p fall last night, fever and  persistent diarrhea.    #sepsis (leukocytosis/tachycardia) due to infective diarrhea, possible c diff infection  -admit to med surg  -check c diff, GI pcr  -iv hydration  -PO vanco  -ID consult    #AFib  -cont coumadin, BB, amio    #FRANCINE on CKD stage 3  -iv fluids  -monitor renal function    #hypothyroidism, gout  -cont home meds

## 2019-06-25 LAB
ALBUMIN SERPL ELPH-MCNC: 2.1 G/DL — LOW (ref 3.3–5)
ALP SERPL-CCNC: 68 U/L — SIGNIFICANT CHANGE UP (ref 40–120)
ALT FLD-CCNC: 37 U/L — SIGNIFICANT CHANGE UP (ref 12–78)
ANION GAP SERPL CALC-SCNC: 9 MMOL/L — SIGNIFICANT CHANGE UP (ref 5–17)
APTT BLD: 29.3 SEC — SIGNIFICANT CHANGE UP (ref 27.5–36.3)
AST SERPL-CCNC: 42 U/L — HIGH (ref 15–37)
BASOPHILS # BLD AUTO: 0.04 K/UL — SIGNIFICANT CHANGE UP (ref 0–0.2)
BASOPHILS NFR BLD AUTO: 0.2 % — SIGNIFICANT CHANGE UP (ref 0–2)
BILIRUB SERPL-MCNC: 1 MG/DL — SIGNIFICANT CHANGE UP (ref 0.2–1.2)
BUN SERPL-MCNC: 35 MG/DL — HIGH (ref 7–23)
C DIFF BY PCR RESULT: DETECTED
C DIFF TOX GENS STL QL NAA+PROBE: SIGNIFICANT CHANGE UP
CALCIUM SERPL-MCNC: 8 MG/DL — LOW (ref 8.5–10.1)
CHLORIDE SERPL-SCNC: 105 MMOL/L — SIGNIFICANT CHANGE UP (ref 96–108)
CO2 SERPL-SCNC: 24 MMOL/L — SIGNIFICANT CHANGE UP (ref 22–31)
CREAT SERPL-MCNC: 2.21 MG/DL — HIGH (ref 0.5–1.3)
CULTURE RESULTS: SIGNIFICANT CHANGE UP
EOSINOPHIL # BLD AUTO: 0 K/UL — SIGNIFICANT CHANGE UP (ref 0–0.5)
EOSINOPHIL NFR BLD AUTO: 0 % — SIGNIFICANT CHANGE UP (ref 0–6)
GLUCOSE SERPL-MCNC: 80 MG/DL — SIGNIFICANT CHANGE UP (ref 70–99)
HCT VFR BLD CALC: 29.4 % — LOW (ref 39–50)
HGB BLD-MCNC: 9.1 G/DL — LOW (ref 13–17)
IMM GRANULOCYTES NFR BLD AUTO: 1.7 % — HIGH (ref 0–1.5)
INR BLD: 1.51 RATIO — HIGH (ref 0.88–1.16)
LYMPHOCYTES # BLD AUTO: 1.1 K/UL — SIGNIFICANT CHANGE UP (ref 1–3.3)
LYMPHOCYTES # BLD AUTO: 5.2 % — LOW (ref 13–44)
MCHC RBC-ENTMCNC: 28.1 PG — SIGNIFICANT CHANGE UP (ref 27–34)
MCHC RBC-ENTMCNC: 31 GM/DL — LOW (ref 32–36)
MCV RBC AUTO: 90.7 FL — SIGNIFICANT CHANGE UP (ref 80–100)
MONOCYTES # BLD AUTO: 1.34 K/UL — HIGH (ref 0–0.9)
MONOCYTES NFR BLD AUTO: 6.3 % — SIGNIFICANT CHANGE UP (ref 2–14)
NEUTROPHILS # BLD AUTO: 18.3 K/UL — HIGH (ref 1.8–7.4)
NEUTROPHILS NFR BLD AUTO: 86.6 % — HIGH (ref 43–77)
PLATELET # BLD AUTO: 153 K/UL — SIGNIFICANT CHANGE UP (ref 150–400)
POTASSIUM SERPL-MCNC: 3.6 MMOL/L — SIGNIFICANT CHANGE UP (ref 3.5–5.3)
POTASSIUM SERPL-SCNC: 3.6 MMOL/L — SIGNIFICANT CHANGE UP (ref 3.5–5.3)
PROT SERPL-MCNC: 5 GM/DL — LOW (ref 6–8.3)
PROTHROM AB SERPL-ACNC: 17 SEC — HIGH (ref 10–12.9)
RBC # BLD: 3.24 M/UL — LOW (ref 4.2–5.8)
RBC # FLD: 16.9 % — HIGH (ref 10.3–14.5)
SODIUM SERPL-SCNC: 138 MMOL/L — SIGNIFICANT CHANGE UP (ref 135–145)
SPECIMEN SOURCE: SIGNIFICANT CHANGE UP
WBC # BLD: 21.13 K/UL — HIGH (ref 3.8–10.5)
WBC # FLD AUTO: 21.13 K/UL — HIGH (ref 3.8–10.5)

## 2019-06-25 PROCEDURE — 74176 CT ABD & PELVIS W/O CONTRAST: CPT | Mod: 26

## 2019-06-25 PROCEDURE — 93010 ELECTROCARDIOGRAM REPORT: CPT

## 2019-06-25 PROCEDURE — 73562 X-RAY EXAM OF KNEE 3: CPT | Mod: 26,RT

## 2019-06-25 RX ORDER — METRONIDAZOLE 500 MG
TABLET ORAL
Refills: 0 | Status: DISCONTINUED | OUTPATIENT
Start: 2019-06-25 | End: 2019-06-30

## 2019-06-25 RX ORDER — METRONIDAZOLE 500 MG
500 TABLET ORAL ONCE
Refills: 0 | Status: COMPLETED | OUTPATIENT
Start: 2019-06-25 | End: 2019-06-25

## 2019-06-25 RX ORDER — LACTOBACILLUS ACIDOPHILUS 100MM CELL
1 CAPSULE ORAL
Refills: 0 | Status: DISCONTINUED | OUTPATIENT
Start: 2019-06-25 | End: 2019-07-02

## 2019-06-25 RX ORDER — METRONIDAZOLE 500 MG
500 TABLET ORAL EVERY 8 HOURS
Refills: 0 | Status: DISCONTINUED | OUTPATIENT
Start: 2019-06-25 | End: 2019-06-30

## 2019-06-25 RX ADMIN — SODIUM CHLORIDE 100 MILLILITER(S): 9 INJECTION INTRAMUSCULAR; INTRAVENOUS; SUBCUTANEOUS at 00:00

## 2019-06-25 RX ADMIN — Medication 100 MILLIGRAM(S): at 13:32

## 2019-06-25 RX ADMIN — Medication 1 TABLET(S): at 18:02

## 2019-06-25 RX ADMIN — SODIUM CHLORIDE 100 MILLILITER(S): 9 INJECTION INTRAMUSCULAR; INTRAVENOUS; SUBCUTANEOUS at 18:19

## 2019-06-25 RX ADMIN — AMIODARONE HYDROCHLORIDE 200 MILLIGRAM(S): 400 TABLET ORAL at 06:10

## 2019-06-25 RX ADMIN — WARFARIN SODIUM 2 MILLIGRAM(S): 2.5 TABLET ORAL at 21:44

## 2019-06-25 RX ADMIN — Medication 1 MILLIGRAM(S): at 11:38

## 2019-06-25 RX ADMIN — Medication 125 MILLIGRAM(S): at 18:02

## 2019-06-25 RX ADMIN — Medication 1 TABLET(S): at 21:43

## 2019-06-25 RX ADMIN — Medication 300 MILLIGRAM(S): at 11:38

## 2019-06-25 RX ADMIN — WARFARIN SODIUM 2 MILLIGRAM(S): 2.5 TABLET ORAL at 00:00

## 2019-06-25 RX ADMIN — Medication 125 MILLIGRAM(S): at 06:10

## 2019-06-25 RX ADMIN — Medication 125 MILLIGRAM(S): at 12:41

## 2019-06-25 RX ADMIN — Medication 125 MILLIGRAM(S): at 00:00

## 2019-06-25 RX ADMIN — SODIUM CHLORIDE 100 MILLILITER(S): 9 INJECTION INTRAMUSCULAR; INTRAVENOUS; SUBCUTANEOUS at 09:09

## 2019-06-25 RX ADMIN — Medication 100 MILLIGRAM(S): at 21:43

## 2019-06-25 RX ADMIN — ATORVASTATIN CALCIUM 20 MILLIGRAM(S): 80 TABLET, FILM COATED ORAL at 21:44

## 2019-06-25 RX ADMIN — Medication 125 MICROGRAM(S): at 06:10

## 2019-06-25 NOTE — DIETITIAN INITIAL EVALUATION ADULT. - ADD RECOMMEND
1. c/w current diet order. 2. add ensure enlive BID 3. add MVI w/ minerals. 4. weekly wt. 5. provide assistance w/ meals PRN.

## 2019-06-25 NOTE — CONSULT NOTE ADULT - ASSESSMENT
88 yrs M  PMHx of A-fib on Coumadin s/p pacemaker, HTN, HLD , CKD  ,Recent C diff Infection presenting to the ED s/p fall last night ,fever and  Persistent diarrhea  . Patient reports that he using the bathroom and when he got up to walk away, his legs felt weak and he slipped and fell hit his Left knee. As per the patient he have been having diarrhoea for the past 3 days, about 3 episodes /day. He was admitted at [facility] for the Pneumonia and as per the chart note ?UTI in the month of May 2019 and discharged to the Rehab .While he was in the Rehab he had diagnosed with the C diff and was treated with the Vancomycin and in addition he had Cellulitis of face and leg. Here febrile to 102, wbc ct 21, was given oral vancomycin/IV flagyl/fluids and vanco/cefepime.      1. diarrhea. fever. leukocytosis. 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 other issues -care per medicine

## 2019-06-25 NOTE — CHART NOTE - NSCHARTNOTEFT_GEN_A_CORE
Upon Nutritional Assessment by the Registered Dietitian your patient was determined to meet criteria / has evidence of the following diagnosis/diagnoses:          [ ]  Mild Protein Calorie Malnutrition        [ ]  Moderate Protein Calorie Malnutrition        [x ] Severe Protein Calorie Malnutrition        [ ] Unspecified Protein Calorie Malnutrition        [ ] Underweight / BMI <19        [ ] Morbid Obesity / BMI > 40      Findings:     Pt was found to be C.diff +. Pt reports some improvement w/ diarrhea however feels that shortly after eating he has a loose BM. Pt denies change in appetite during this time and pt was consuming normal intake. Pt reports noticing wt loss and states # and admission wt (125#) reveals 10# wt loss. Pt states this wt loss occurred over past 2 mths which is clinically significant. Pt noted w/ +1 edema in b/l LE possibly masking further wt loss. NFPE significant for muscle wasting: mild: temporal; fat depletion: mild: occipital and buccal. Based on above pt meets severe malnutrition in the context of acute illness AEB mild muscle/fat wasting, wt loss of 7.4% BW in 2 months, and +1 edema. D/w pt and recommend adding ensure enlive BID to supplement intake until diarrhea resolved. No noted skin breakdown. [place] 17. No c/o chewing/swallowing difficulties. No c/o n/v/abd pain currently.    Findings as based on:  •  Comprehensive nutrition assessment and consultation  •  Calorie counts (nutrient intake analysis)  •  Food acceptance and intake status from observations by staff  •  Follow up  •  Patient education  •  Intervention secondary to interdisciplinary rounds  •   concerns      Treatment:      1. c/w current diet order.   2. add ensure enlive BID   3. add MVI w/ minerals.   4. weekly wt.   5. provide assistance w/ meals PRN.    The following diet has been recommended:      PROVIDER Section:     By signing this assessment you are acknowledging and agree with the diagnosis/diagnoses assigned by the Registered Dietitian    Comments:

## 2019-06-25 NOTE — PROGRESS NOTE ADULT - SUBJECTIVE AND OBJECTIVE BOX
CHIEF COMPLAINT:    SUBJECTIVE:     REVIEW OF SYSTEMS:  CONSTITUTIONAL: No weakness, fevers or chills  EYES/ENT: No visual changes;  No vertigo or throat pain   NECK: No pain or stiffness  RESPIRATORY: No cough, wheezing, hemoptysis; No shortness of breath  CARDIOVASCULAR: No chest pain or palpitations  GASTROINTESTINAL: No abdominal or epigastric pain. No nausea, vomiting, or hematemesis; No diarrhea or constipation. No melena or hematochezia.  GENITOURINARY: No dysuria, frequency or hematuria  NEUROLOGICAL: No numbness or weakness  SKIN: No itching, burning, rashes, or lesions   All other review of systems is negative unless indicated above    Vital Signs Last 24 Hrs  T(C): 37.4 (25 Jun 2019 17:18), Max: 38.7 (24 Jun 2019 18:15)  T(F): 99.3 (25 Jun 2019 17:18), Max: 101.7 (24 Jun 2019 18:15)  HR: 61 (25 Jun 2019 17:18) (58 - 87)  BP: 101/43 (25 Jun 2019 17:18) (98/42 - 122/56)  BP(mean): 74 (24 Jun 2019 23:06) (74 - 74)  RR: 18 (25 Jun 2019 17:18) (18 - 22)  SpO2: 97% (25 Jun 2019 17:18) (95% - 100%)    I&O's Summary      CAPILLARY BLOOD GLUCOSE          PHYSICAL EXAM:  Constitutional: NAD, awake and alert, well-developed  HEENT: PERR, EOMI, Normal Hearing, MMM  Neck: Soft and supple, No LAD, No JVD  Respiratory: Breath sounds are clear bilaterally, No wheezing, rales or rhonchi  Cardiovascular: S1 and S2, regular rate and rhythm, no Murmurs, gallops or rubs  Gastrointestinal: Bowel Sounds present, soft, nontender, nondistended, no guarding, no rebound  Extremities: No peripheral edema  Vascular: 2+ peripheral pulses  Neurological: A/O x 3, no focal deficits  Musculoskeletal: 5/5 strength b/l upper and lower extremities  Skin: No rashes    MEDICATIONS:  MEDICATIONS  (STANDING):  allopurinol 300 milliGRAM(s) Oral daily  amiodarone    Tablet 200 milliGRAM(s) Oral daily  atorvastatin 20 milliGRAM(s) Oral at bedtime  folic acid 1 milliGRAM(s) Oral daily  lactobacillus acidophilus 1 Tablet(s) Oral two times a day  levothyroxine 125 MICROGram(s) Oral daily  metoprolol tartrate 25 milliGRAM(s) Oral two times a day  metroNIDAZOLE  IVPB      metroNIDAZOLE  IVPB 500 milliGRAM(s) IV Intermittent every 8 hours  multivitamin 1 Tablet(s) Oral daily  sodium chloride 0.9%. 1000 milliLiter(s) (100 mL/Hr) IV Continuous <Continuous>  vancomycin    Solution 125 milliGRAM(s) Oral every 6 hours  warfarin 2 milliGRAM(s) Oral daily      LABS: All Labs Reviewed:                        9.1    21.13 )-----------( 153      ( 25 Jun 2019 06:36 )             29.4     06-25    138  |  105  |  35<H>  ----------------------------<  80  3.6   |  24  |  2.21<H>    Ca    8.0<L>      25 Jun 2019 06:36    TPro  5.0<L>  /  Alb  2.1<L>  /  TBili  1.0  /  DBili  x   /  AST  42<H>  /  ALT  37  /  AlkPhos  68  06-25    PT/INR - ( 25 Jun 2019 13:50 )   PT: 17.0 sec;   INR: 1.51 ratio         PTT - ( 25 Jun 2019 13:50 )  PTT:29.3 sec      Blood Culture:     RADIOLOGY/EKG:    DVT PPX:    ADVANCED DIRECTIVE:    DISPOSITION: SUBJECTIVE: 88 yrs M  PMHx of A-fib on Coumadin s/p pacemaker, HTN, HLD , CKD  ,Recent C diff Infection presenting to the ED s/p fall last night ,fever and  Persistent diarrhoea  .   Patient reports that he using the bathroom and when he got up to walk away, his legs felt weak and he slipped and fell hit his Left knee . Family helped pt off the ground and pt was able to ambulate with assistance. As per the patient he have been having diarrhoea for the past 3 days , about 3 episodes /day .Describes as he used a "stool softener "   He was admitted at [facility] for the Pneumonia and as per the chart note ?UTI in the month of May 2019 and discharged to the Rehab .While he was in the Rehab he had diagnosed with the C diff and was treated with the Vancomycin and in addition he had Cellulitis of face and leg .Today he had a fever of 102 F and was making no sense and family both him to the hospital .   Denies head trauma , Loss of consciousness , cough, sorethroat  ,URI symptoms , abd pain, blood in stools ,headache ,change  in the urination including dysuria .  Baseline he uses a walker ,has limited mobility for the past few years .    In ED patient had fever of 101.7 and received 3.2 liter fluid and vancomycin and Cefepime .     6/25/19: Patient seen and evaluated by medical team at bedside. This AM patient had 3 loose bowel movements with mucous. Patient states that he feels weak but denies any abdominal pain at this time. DEnies fevers or chills or nausea.     REVIEW OF SYSTEMS:  CONSTITUTIONAL: + weakness; no fevers or chills  EYES/ENT: No visual changes;  No vertigo or throat pain   NECK: No pain or stiffness  RESPIRATORY: No cough, wheezing, hemoptysis; No shortness of breath  CARDIOVASCULAR: No chest pain or palpitations  GASTROINTESTINAL: No abdominal or epigastric pain. No nausea, vomiting, or hematemesis; + diarrhea. No melena or hematochezia.  GENITOURINARY: No dysuria, frequency or hematuria  NEUROLOGICAL: No numbness or weakness  SKIN: No itching, burning, rashes, or lesions   All other review of systems is negative unless indicated above    Vital Signs Last 24 Hrs  T(C): 37.4 (25 Jun 2019 17:18), Max: 38.7 (24 Jun 2019 18:15)  T(F): 99.3 (25 Jun 2019 17:18), Max: 101.7 (24 Jun 2019 18:15)  HR: 61 (25 Jun 2019 17:18) (58 - 87)  BP: 101/43 (25 Jun 2019 17:18) (98/42 - 122/56)  BP(mean): 74 (24 Jun 2019 23:06) (74 - 74)  RR: 18 (25 Jun 2019 17:18) (18 - 22)  SpO2: 97% (25 Jun 2019 17:18) (95% - 100%)    PHYSICAL EXAM:  Constitutional: NAD, awake and alert, elderly gentleman frail appearing  HEENT: PERR, EOMI, Normal Hearing, dry mucous membranes  Neck: Soft and supple, No LAD, No JVD  Respiratory: Breath sounds are clear bilaterally, No wheezing, rales or rhonchi  Cardiovascular: S1 and S2, regular rate and rhythm, no Murmurs, gallops or rubs  Gastrointestinal: Bowel Sounds present, soft, nontender, nondistended, no guarding, no rebound  Extremities: dry skin, + mild erythema on RLE heel - no wound noted  Vascular: 2+ peripheral pulses  Neurological: A/O x 3, no focal deficits  Musculoskeletal: 4/5 strength lower extremities      MEDICATIONS:  MEDICATIONS  (STANDING):  allopurinol 300 milliGRAM(s) Oral daily  amiodarone    Tablet 200 milliGRAM(s) Oral daily  atorvastatin 20 milliGRAM(s) Oral at bedtime  folic acid 1 milliGRAM(s) Oral daily  lactobacillus acidophilus 1 Tablet(s) Oral two times a day  levothyroxine 125 MICROGram(s) Oral daily  metoprolol tartrate 25 milliGRAM(s) Oral two times a day  metroNIDAZOLE  IVPB      metroNIDAZOLE  IVPB 500 milliGRAM(s) IV Intermittent every 8 hours  multivitamin 1 Tablet(s) Oral daily  sodium chloride 0.9%. 1000 milliLiter(s) (100 mL/Hr) IV Continuous <Continuous>  vancomycin    Solution 125 milliGRAM(s) Oral every 6 hours  warfarin 2 milliGRAM(s) Oral daily      LABS: All Labs Reviewed:                        9.1    21.13 )-----------( 153      ( 25 Jun 2019 06:36 )             29.4     06-25    138  |  105  |  35<H>  ----------------------------<  80  3.6   |  24  |  2.21<H>    Ca    8.0<L>      25 Jun 2019 06:36    TPro  5.0<L>  /  Alb  2.1<L>  /  TBili  1.0  /  DBili  x   /  AST  42<H>  /  ALT  37  /  AlkPhos  68  06-25    PT/INR - ( 25 Jun 2019 13:50 )   PT: 17.0 sec;   INR: 1.51 ratio      < from: CT Abdomen and Pelvis No Cont (12.15.17 @ 02:46) >    CHEST:     LUNGS AND LARGE AIRWAYS: Left apical scarring and fibrosis. Patent   central airways.No pulmonary nodules.  PLEURA: No pleural effusion.  VESSELS: Severe atherosclerotic disease..  HEART: Heart size is normal.No pericardial effusion.  MEDIASTINUM AND DARRYL: No lymphadenopathy.  CHEST WALL AND LOWER NECK: Within normal limits.    ABDOMEN AND PELVIS:    LIVER: Within normal limits.  BILE DUCTS: Normal caliber.  GALLBLADDER: Within normal limits.  SPLEEN: Within normal limits.  PANCREAS: Within normal limits.  ADRENALS: Within normal limits.  KIDNEYS/URETERS: Multiple renal cysts of simple fluid or complex fluid.   7.4 x 5.2 x 6.6 cm sized fluid attenuation lesion adjacent to the left   kidney.    BLADDER: Within normal limits.  REPRODUCTIVE ORGANS: Unremarkable.    BOWEL: No bowel obstruction. Appendix is not visualized. Sigmoid   diverticulosis without diverticulitis.   PERITONEUM: No ascites.  VESSELS:  Within normal limits.  RETROPERITONEUM: No lymphadenopathy.    ABDOMINAL WALL:Within normal limits.  BONES: Within normal limits.    IMPRESSION:     Chest: No acute injury to the thorax. Left apical scarring.    Abdomen: Sigmoid diverticulosis without diverticulitis. Multiple small   renal cysts. Large cyst adjacent to the left the kidney is either an   exophytic renal cyst or retroperitoneal cyst.    < end of copied text >         PTT - ( 25 Jun 2019 13:50 )  PTT:29.3 sec      Blood Culture:     RADIOLOGY/EKG:    DVT PPX:    ADVANCED DIRECTIVE:    DISPOSITION:

## 2019-06-25 NOTE — CONSULT NOTE ADULT - SUBJECTIVE AND OBJECTIVE BOX
Patient is a 88y old  Male who presents with a chief complaint of Fever ,fall ,diarrhoea (2019 21:01)    HPI:  88 yrs M  PMHx of A-fib on Coumadin s/p pacemaker, HTN, HLD , CKD  ,Recent C diff Infection presenting to the ED s/p fall last night ,fever and  Persistent diarrhea  . Patient reports that he using the bathroom and when he got up to walk away, his legs felt weak and he slipped and fell hit his Left knee. As per the patient he have been having diarrhoea for the past 3 days, about 3 episodes /day. He was admitted at [facility] for the Pneumonia and as per the chart note ?UTI in the month of May 2019 and discharged to the Rehab .While he was in the Rehab he had diagnosed with the C diff and was treated with the Vancomycin and in addition he had Cellulitis of face and leg. Here febrile to 102, wbc ct 21, was given oral vancomycin/IV flagyl/fluids and vanco/cefepime.      PMH: as above  PSH: as above  Meds: per reconciliation sheet, noted below  MEDICATIONS  (STANDING):  allopurinol 300 milliGRAM(s) Oral daily  amiodarone    Tablet 200 milliGRAM(s) Oral daily  atorvastatin 20 milliGRAM(s) Oral at bedtime  folic acid 1 milliGRAM(s) Oral daily  levothyroxine 125 MICROGram(s) Oral daily  metoprolol tartrate 25 milliGRAM(s) Oral two times a day  metroNIDAZOLE  IVPB      sodium chloride 0.9%. 1000 milliLiter(s) (100 mL/Hr) IV Continuous <Continuous>  vancomycin    Solution 125 milliGRAM(s) Oral every 6 hours  warfarin 2 milliGRAM(s) Oral daily      Allergies    penicillins (Unknown)    Intolerances      Social: no smoking, no alcohol, no illegal drugs; no recent travel, no exposure to TB  FAMILY HISTORY:  FHx: cancer     no history of premature cardiovascular disease in first degree relatives    ROS: the patient denies fever, no chills, no HA, no dizziness, no sore throat, no blurry vision, no CP, no palpitations, no SOB, no cough, no abdominal pain, no N/V, no dysuria, no leg pain, no claudication, no rash, no joint aches, no rectal pain or bleeding, no night sweats  All other systems reviewed and are negative    Vital Signs Last 24 Hrs  T(C): 37.6 (2019 10:21), Max: 39.2 (2019 15:05)  T(F): 99.6 (2019 10:21), Max: 102.6 (2019 15:05)  HR: 60 (2019 10:21) (58 - 101)  BP: 102/40 (2019 10:21) (71/55 - 122/56)  BP(mean): 74 (2019 23:06) (74 - 74)  RR: 18 (2019 10:21) (17 - 22)  SpO2: 95% (2019 10:21) (94% - 100%)  Daily Height in cm: 170.18 (2019 14:37)    Daily     PE:  Constitutional: frail looking  HEENT: NC/AT, EOMI, PERRLA, conjunctivae clear; ears and nose atraumatic; pharynx benign  Neck: supple; thyroid not palpable  Back: no tenderness  Respiratory: respiratory effort normal; clear to auscultation  Cardiovascular: S1S2 regular, no murmurs  Abdomen: soft, not tender, not distended, positive BS; liver and spleen WNL  Genitourinary: no suprapubic tenderness  Lymphatic: no LN palpable  Musculoskeletal: no muscle tenderness, no joint swelling or tenderness  Extremities: no pedal edema  Neurological/ Psychiatric:  moving all extremities  Skin: no rashes; no palpable lesions    Labs: all available labs reviewed                        9.1    21.13 )-----------( 153      ( 2019 06:36 )             29.4     06-25    138  |  105  |  35<H>  ----------------------------<  80  3.6   |  24  |  2.21<H>    Ca    8.0<L>      2019 06:36    TPro  5.0<L>  /  Alb  2.1<L>  /  TBili  1.0  /  DBili  x   /  AST  42<H>  /  ALT  37  /  AlkPhos  68  06-25     LIVER FUNCTIONS - ( 2019 06:36 )  Alb: 2.1 g/dL / Pro: 5.0 gm/dL / ALK PHOS: 68 U/L / ALT: 37 U/L / AST: 42 U/L / GGT: x           Urinalysis Basic - ( 2019 16:45 )    Color: Yellow / Appearance: Clear / S.010 / pH: x  Gluc: x / Ketone: Negative  / Bili: Negative / Urobili: Negative mg/dL   Blood: x / Protein: Negative mg/dL / Nitrite: Negative   Leuk Esterase: Small / RBC: Negative /HPF / WBC 6-10   Sq Epi: x / Non Sq Epi: Few / Bacteria: Occasional        Radiology: all available radiological tests reviewed    EXAM:  XR CHEST PORTABLE IMMED 1V                            PROCEDURE DATE:  2019          INTERPRETATION:  Portable chest radiograph dated 2019.    COMPARISON: 2017.    CLINICAL INFORMATION: Sepsis.  FINDINGS:    The airway is midline.  There are no airspace consolidations. Chronic left upper lobe scarring is   again noted.  There is no pleural effusion or pneumothorax.   The cardiac silhouette is normal size. Continued application of the left   subclavian vein pacemaker, leads are in the right heart.  The bones are normal.     IMPRESSION:    No acute cardiopulmonary findings.      Advanced directives addressed: full resuscitation

## 2019-06-25 NOTE — DIETITIAN INITIAL EVALUATION ADULT. - NUTRITIONGOAL OUTCOME1
Pt will consume at least 80% of meals/supplements, no further unintentional wt loss, reduced s/s mal

## 2019-06-25 NOTE — PROGRESS NOTE ADULT - ASSESSMENT
88 yrs M  PMHx of A-fib on Coumadin s/p pacemaker, HTN, HLD , CKD  ,Recent C diff Infection presenting to the ED s/p fall last night ,fever and  Persistent diarrhoea  .     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  -In the ED received Vancomycin and cefepime   -In the ED 3.2 liter bolus  -continue PO Vancomycin  - Flagyl added today  -ID recommendations appreciated  -continue IVF as patients BP on lower end of normal (BP medications not given today)  -Blood cultures x 2 follow up     # Atrial fibrillation   - continue amiodarone ,Metoprolol (with hold parameters)  - INR 1.51 today  - Warfarin 2mg tonight (home dose 1 mg daily)    # FRANCINE on CKD 3  - mild increase in Cr today, likely due to continued diarrhea  -No NSAIDs  -Baseline 1.6-1.8  - continue IVF    #Hypothyroidism   -continue synthroid    #Gout  -continue Allopurinol       #Weakness likely secondary to Deconditioning and or Sepsis and or Diarrhea  -Physical therapy eval pending    #HTN  -DASH diet  -continue Metoprolol     #HLD  -continue  statin    #Dispo: continued hospitalization

## 2019-06-25 NOTE — DIETITIAN INITIAL EVALUATION ADULT. - PHYSICAL APPEARANCE
obese/BMI 35.4; NFPE significant for muscle wasting: mild: temporal; fat depletion: mild: occipital and buccal.

## 2019-06-25 NOTE — PROGRESS NOTE ADULT - ATTENDING COMMENTS
on exam- comfortable   -rs-aeeb, cta  -p/a-soft, bs+  -cvs-s1s2 normal     #C diff - ct oral vanco, flagyl and monitor

## 2019-06-25 NOTE — DIETITIAN INITIAL EVALUATION ADULT. - PERTINENT LABORATORY DATA
06-25 Na138 mmol/L Glu 80 mg/dL K+ 3.6 mmol/L Cr  2.21 mg/dL<H> BUN 35 mg/dL<H> Phos n/a   Alb 2.1 g/dL<L> PAB n/a

## 2019-06-25 NOTE — DIETITIAN INITIAL EVALUATION ADULT. - PERTINENT MEDS FT
MEDICATIONS  (STANDING):  allopurinol 300 milliGRAM(s) Oral daily  amiodarone    Tablet 200 milliGRAM(s) Oral daily  atorvastatin 20 milliGRAM(s) Oral at bedtime  folic acid 1 milliGRAM(s) Oral daily  levothyroxine 125 MICROGram(s) Oral daily  metoprolol tartrate 25 milliGRAM(s) Oral two times a day  metroNIDAZOLE  IVPB      metroNIDAZOLE  IVPB 500 milliGRAM(s) IV Intermittent every 8 hours  sodium chloride 0.9%. 1000 milliLiter(s) (100 mL/Hr) IV Continuous <Continuous>  vancomycin    Solution 125 milliGRAM(s) Oral every 6 hours  warfarin 2 milliGRAM(s) Oral daily    MEDICATIONS  (PRN):  acetaminophen   Tablet .. 650 milliGRAM(s) Oral every 6 hours PRN Temp greater or equal to 38C (100.4F), Mild Pain (1 - 3), Moderate Pain (4 - 6)

## 2019-06-25 NOTE — DIETITIAN INITIAL EVALUATION ADULT. - MALNUTRITION
pt meets severe malnutrition in the context of acute illness AEB mild muscle/fat wasting, wt loss of 7.4% BW in 2 months, and +1 edema. pt meets severe malnutrition in the context of acute illness

## 2019-06-25 NOTE — PHYSICAL THERAPY INITIAL EVALUATION ADULT - DIAGNOSIS, PT EVAL
sepsis ,+diarrhea,r/o recurrent/persistent C.Diff infection,fever to 102F,leukocytosis ,generalized weakness,fall injuring L knee in home after using bathroom

## 2019-06-25 NOTE — DIETITIAN INITIAL EVALUATION ADULT. - OTHER INFO
Pt is a 89 yo M w/ PMH A-fib on Coumadin s/p pacemaker, HTN, HLD , CKD  ,Recent C diff Infection presenting to the ED s/p fall last night ,fever and  Persistent diarrhea. Pt was found to be C.diff +. Pt reports some improvement w/ diarrhea however feels that shortly after eating he has a loose BM. Pt denies change in appetite during this time and pt was consuming normal intake. Pt reports noticing wt loss and states # and admission wt (125#) reveals 10# wt loss. Pt states this wt loss occurred over past 2 mths which is clinically significant. Pt noted w/ +1 edema in b/l LE possibly masking further wt loss. NFPE significant for muscle wasting: mild: temporal; fat depletion: mild: occipital and buccal. Based on above pt meets severe malnutrition in the context of acute illness AEB mild muscle/fat wasting, wt loss of 7.4% BW in 2 months, and +1 edema. D/w pt and recommend adding ensure enlive BID to supplement intake until diarrhea resolved. No noted skin breakdown. [place] 17. No c/o chewing/swallowing difficulties. No c/o n/v/abd pain currently. Recommendations: 1. c/w current diet order. 2. add ensure enlive BID 3. add MVI w/ minerals. 4. weekly wt. 5. provide assistance w/ meals PRN.

## 2019-06-25 NOTE — PHYSICAL THERAPY INITIAL EVALUATION ADULT - PATIENT PROFILE REVIEW, REHAB EVAL
yes/pt was hospitalized in May 2019 @ [facility] with PNA/UTI,sent to rehab ,diagnosed with +C.Diff,also cellulitis face & leg,treted with Vancomycin

## 2019-06-26 ENCOUNTER — TRANSCRIPTION ENCOUNTER (OUTPATIENT)
Age: 84
End: 2019-06-26

## 2019-06-26 LAB
ANION GAP SERPL CALC-SCNC: 8 MMOL/L — SIGNIFICANT CHANGE UP (ref 5–17)
BUN SERPL-MCNC: 42 MG/DL — HIGH (ref 7–23)
CALCIUM SERPL-MCNC: 7.9 MG/DL — LOW (ref 8.5–10.1)
CHLORIDE SERPL-SCNC: 107 MMOL/L — SIGNIFICANT CHANGE UP (ref 96–108)
CO2 SERPL-SCNC: 23 MMOL/L — SIGNIFICANT CHANGE UP (ref 22–31)
CREAT SERPL-MCNC: 2.17 MG/DL — HIGH (ref 0.5–1.3)
CULTURE RESULTS: SIGNIFICANT CHANGE UP
GLUCOSE SERPL-MCNC: 112 MG/DL — HIGH (ref 70–99)
INR BLD: 1.41 RATIO — HIGH (ref 0.88–1.16)
MAGNESIUM SERPL-MCNC: 2 MG/DL — SIGNIFICANT CHANGE UP (ref 1.6–2.6)
POTASSIUM SERPL-MCNC: 3.2 MMOL/L — LOW (ref 3.5–5.3)
POTASSIUM SERPL-SCNC: 3.2 MMOL/L — LOW (ref 3.5–5.3)
PROTHROM AB SERPL-ACNC: 15.8 SEC — HIGH (ref 10–12.9)
SODIUM SERPL-SCNC: 138 MMOL/L — SIGNIFICANT CHANGE UP (ref 135–145)
SPECIMEN SOURCE: SIGNIFICANT CHANGE UP

## 2019-06-26 RX ORDER — POTASSIUM CHLORIDE 20 MEQ
40 PACKET (EA) ORAL EVERY 4 HOURS
Refills: 0 | Status: COMPLETED | OUTPATIENT
Start: 2019-06-26 | End: 2019-06-26

## 2019-06-26 RX ADMIN — Medication 100 MILLIGRAM(S): at 05:04

## 2019-06-26 RX ADMIN — Medication 125 MILLIGRAM(S): at 11:04

## 2019-06-26 RX ADMIN — Medication 1 TABLET(S): at 17:00

## 2019-06-26 RX ADMIN — WARFARIN SODIUM 2 MILLIGRAM(S): 2.5 TABLET ORAL at 23:04

## 2019-06-26 RX ADMIN — Medication 125 MILLIGRAM(S): at 00:00

## 2019-06-26 RX ADMIN — ATORVASTATIN CALCIUM 20 MILLIGRAM(S): 80 TABLET, FILM COATED ORAL at 23:11

## 2019-06-26 RX ADMIN — Medication 125 MILLIGRAM(S): at 23:04

## 2019-06-26 RX ADMIN — Medication 125 MILLIGRAM(S): at 06:36

## 2019-06-26 RX ADMIN — Medication 125 MILLIGRAM(S): at 17:01

## 2019-06-26 RX ADMIN — Medication 1 TABLET(S): at 05:04

## 2019-06-26 RX ADMIN — Medication 40 MILLIEQUIVALENT(S): at 08:29

## 2019-06-26 RX ADMIN — Medication 125 MICROGRAM(S): at 05:04

## 2019-06-26 RX ADMIN — Medication 100 MILLIGRAM(S): at 13:29

## 2019-06-26 RX ADMIN — Medication 100 MILLIGRAM(S): at 23:04

## 2019-06-26 RX ADMIN — Medication 40 MILLIEQUIVALENT(S): at 13:29

## 2019-06-26 RX ADMIN — Medication 1 TABLET(S): at 11:04

## 2019-06-26 RX ADMIN — Medication 1 MILLIGRAM(S): at 11:04

## 2019-06-26 RX ADMIN — Medication 300 MILLIGRAM(S): at 11:04

## 2019-06-26 NOTE — PROGRESS NOTE ADULT - SUBJECTIVE AND OBJECTIVE BOX
SUBJECTIVE:  88 yrs M  PMHx of A-fib on Coumadin s/p pacemaker, HTN, HLD , CKD  ,Recent C diff Infection presenting to the ED s/p fall last night ,fever and  Persistent diarrhoea  .   Patient reports that he using the bathroom and when he got up to walk away, his legs felt weak and he slipped and fell hit his Left knee . Family helped pt off the ground and pt was able to ambulate with assistance. As per the patient he have been having diarrhoea for the past 3 days , about 3 episodes /day .Describes as he used a "stool softener "   He was admitted at [facility] for the Pneumonia and as per the chart note ?UTI in the month of May 2019 and discharged to the Rehab .While he was in the Rehab he had diagnosed with the C diff and was treated with the Vancomycin and in addition he had Cellulitis of face and leg .Today he had a fever of 102 F and was making no sense and family both him to the hospital .   Denies head trauma , Loss of consciousness , cough, sorethroat  ,URI symptoms , abd pain, blood in stools ,headache ,change  in the urination including dysuria .  Baseline he uses a walker ,has limited mobility for the past few years .    In ED patient had fever of 101.7 and received 3.2 liter fluid and vancomycin and Cefepime .     6/26/19:     REVIEW OF SYSTEMS:  CONSTITUTIONAL: No weakness, fevers or chills  EYES/ENT: No visual changes;  No vertigo or throat pain   NECK: No pain or stiffness  RESPIRATORY: No cough, wheezing, hemoptysis; No shortness of breath  CARDIOVASCULAR: No chest pain or palpitations  GASTROINTESTINAL: No abdominal or epigastric pain. No nausea, vomiting, or hematemesis; No diarrhea or constipation. No melena or hematochezia.  GENITOURINARY: No dysuria, frequency or hematuria  NEUROLOGICAL: No numbness or weakness  SKIN: No itching, burning, rashes, or lesions   All other review of systems is negative unless indicated above    Vital Signs Last 24 Hrs  T(C): 36.8 (26 Jun 2019 11:02), Max: 37.4 (25 Jun 2019 17:18)  T(F): 98.3 (26 Jun 2019 11:02), Max: 99.3 (25 Jun 2019 17:18)  HR: 66 (26 Jun 2019 11:02) (60 - 68)  BP: 125/51 (26 Jun 2019 11:02) (100/44 - 125/51)  BP(mean): --  RR: 17 (26 Jun 2019 11:02) (17 - 18)  SpO2: 100% (26 Jun 2019 11:02) (97% - 100%)    I&O's Summary      CAPILLARY BLOOD GLUCOSE          PHYSICAL EXAM:  Constitutional: NAD, awake and alert, well-developed  HEENT: PERR, EOMI, Normal Hearing, MMM  Neck: Soft and supple, No LAD, No JVD  Respiratory: Breath sounds are clear bilaterally, No wheezing, rales or rhonchi  Cardiovascular: S1 and S2, regular rate and rhythm, no Murmurs, gallops or rubs  Gastrointestinal: Bowel Sounds present, soft, nontender, nondistended, no guarding, no rebound  Extremities: No peripheral edema  Vascular: 2+ peripheral pulses  Neurological: A/O x 3, no focal deficits  Musculoskeletal: 5/5 strength b/l upper and lower extremities  Skin: No rashes    MEDICATIONS:  MEDICATIONS  (STANDING):  allopurinol 300 milliGRAM(s) Oral daily  amiodarone    Tablet 200 milliGRAM(s) Oral daily  atorvastatin 20 milliGRAM(s) Oral at bedtime  folic acid 1 milliGRAM(s) Oral daily  lactobacillus acidophilus 1 Tablet(s) Oral two times a day  levothyroxine 125 MICROGram(s) Oral daily  metoprolol tartrate 25 milliGRAM(s) Oral two times a day  metroNIDAZOLE  IVPB      metroNIDAZOLE  IVPB 500 milliGRAM(s) IV Intermittent every 8 hours  multivitamin 1 Tablet(s) Oral daily  vancomycin    Solution 125 milliGRAM(s) Oral every 6 hours  warfarin 2 milliGRAM(s) Oral daily      LABS: All Labs Reviewed:                        9.1    21.13 )-----------( 153      ( 25 Jun 2019 06:36 )             29.4     06-26    138  |  107  |  42<H>  ----------------------------<  112<H>  3.2<L>   |  23  |  2.17<H>    Ca    7.9<L>      26 Jun 2019 07:04  Mg     2.0     06-26    TPro  5.0<L>  /  Alb  2.1<L>  /  TBili  1.0  /  DBili  x   /  AST  42<H>  /  ALT  37  /  AlkPhos  68  06-25    PT/INR - ( 26 Jun 2019 07:04 )   PT: 15.8 sec;   INR: 1.41 ratio         PTT - ( 25 Jun 2019 13:50 )  PTT:29.3 sec      Blood Culture: 06-24 @ 16:45  Organism --  Gram Stain Blood -- Gram Stain --  Specimen Source .Urine Clean Catch (Midstream)  Culture-Blood --    06-24 @ 15:15  Organism --  Gram Stain Blood -- Gram Stain --  Specimen Source .Blood Blood-Peripheral  Culture-Blood --    06-24 @ 10:40  Organism --  Gram Stain Blood -- Gram Stain --  Specimen Source .Stool Feces  Culture-Blood --        RADIOLOGY/EKG:    DVT PPX:    ADVANCED DIRECTIVE:    DISPOSITION: SUBJECTIVE:  88 yrs M  PMHx of A-fib on Coumadin s/p pacemaker, HTN, HLD , CKD  ,Recent C diff Infection presenting to the ED s/p fall last night ,fever and  Persistent diarrhoea  .   Patient reports that he using the bathroom and when he got up to walk away, his legs felt weak and he slipped and fell hit his Left knee . Family helped pt off the ground and pt was able to ambulate with assistance. As per the patient he have been having diarrhoea for the past 3 days , about 3 episodes /day .Describes as he used a "stool softener "   He was admitted at [facility] for the Pneumonia and as per the chart note ?UTI in the month of May 2019 and discharged to the Rehab .While he was in the Rehab he had diagnosed with the C diff and was treated with the Vancomycin and in addition he had Cellulitis of face and leg .Today he had a fever of 102 F and was making no sense and family both him to the hospital .   Denies head trauma , Loss of consciousness , cough, sorethroat  ,URI symptoms , abd pain, blood in stools ,headache ,change  in the urination including dysuria .  Baseline he uses a walker ,has limited mobility for the past few years .    In ED patient had fever of 101.7 and received 3.2 liter fluid and vancomycin and Cefepime .     6/26/19: Patient seen and examined at bedside with medical team. At this time, patient has no complaints. He notes that the frequency of bowel movements have decreased- he had 3 bowel movements overnight. He is tolerating diet. Denies fevers or chills.    REVIEW OF SYSTEMS:  CONSTITUTIONAL: +weakness; fevers or chills  EYES/ENT: No visual changes;  No vertigo or throat pain   NECK: No pain or stiffness  RESPIRATORY: No cough, wheezing, hemoptysis; No shortness of breath  CARDIOVASCULAR: No chest pain or palpitations  GASTROINTESTINAL: No abdominal or epigastric pain. No nausea, vomiting, or hematemesis; + diarrhea . No melena or hematochezia.  GENITOURINARY: No dysuria, frequency or hematuria  NEUROLOGICAL: No numbness or weakness  SKIN: No itching, burning, rashes, or lesions   All other review of systems is negative unless indicated above    Vital Signs Last 24 Hrs  T(C): 36.8 (26 Jun 2019 11:02), Max: 37.4 (25 Jun 2019 17:18)  T(F): 98.3 (26 Jun 2019 11:02), Max: 99.3 (25 Jun 2019 17:18)  HR: 66 (26 Jun 2019 11:02) (60 - 68)  BP: 125/51 (26 Jun 2019 11:02) (100/44 - 125/51)  BP(mean): --  RR: 17 (26 Jun 2019 11:02) (17 - 18)  SpO2: 100% (26 Jun 2019 11:02) (97% - 100%)    I&O's Summary      CAPILLARY BLOOD GLUCOSE          PHYSICAL EXAM:  Constitutional: NAD, awake and alert, elderly gentleman frail appearing  HEENT: PERR, EOMI, Normal Hearing, moist mucous membranes  Neck: Soft and supple, No LAD, No JVD  Respiratory: Breath sounds are clear bilaterally, No wheezing, rales or rhonchi  Cardiovascular: S1 and S2, regular rate and rhythm, no Murmurs, gallops or rubs  Gastrointestinal: Bowel Sounds present, soft, nontender, nondistended, no guarding, no rebound  Extremities: dry skin, + mild erythema on RLE heel - no wound noted  Vascular: 2+ peripheral pulses  Neurological: A/O x 3, no focal deficits  Musculoskeletal: 4/5 strength lower extremities    MEDICATIONS:  MEDICATIONS  (STANDING):  allopurinol 300 milliGRAM(s) Oral daily  amiodarone    Tablet 200 milliGRAM(s) Oral daily  atorvastatin 20 milliGRAM(s) Oral at bedtime  folic acid 1 milliGRAM(s) Oral daily  lactobacillus acidophilus 1 Tablet(s) Oral two times a day  levothyroxine 125 MICROGram(s) Oral daily  metoprolol tartrate 25 milliGRAM(s) Oral two times a day  metroNIDAZOLE  IVPB      metroNIDAZOLE  IVPB 500 milliGRAM(s) IV Intermittent every 8 hours  multivitamin 1 Tablet(s) Oral daily  vancomycin    Solution 125 milliGRAM(s) Oral every 6 hours  warfarin 2 milliGRAM(s) Oral daily      LABS: All Labs Reviewed:                        9.1    21.13 )-----------( 153      ( 25 Jun 2019 06:36 )             29.4     06-26    138  |  107  |  42<H>  ----------------------------<  112<H>  3.2<L>   |  23  |  2.17<H>    Ca    7.9<L>      26 Jun 2019 07:04  Mg     2.0     06-26    TPro  5.0<L>  /  Alb  2.1<L>  /  TBili  1.0  /  DBili  x   /  AST  42<H>  /  ALT  37  /  AlkPhos  68  06-25    PT/INR - ( 26 Jun 2019 07:04 )   PT: 15.8 sec;   INR: 1.41 ratio         PTT - ( 25 Jun 2019 13:50 )  PTT:29.3 sec      Blood Culture: 06-24 @ 16:45  Organism --  Gram Stain Blood -- Gram Stain --  Specimen Source .Urine Clean Catch (Midstream)  Culture-Blood --    06-24 @ 15:15  Organism --  Gram Stain Blood -- Gram Stain --  Specimen Source .Blood Blood-Peripheral  Culture-Blood --    06-24 @ 10:40  Organism --  Gram Stain Blood -- Gram Stain --  Specimen Source .Stool Feces  Culture-Blood --

## 2019-06-26 NOTE — PROGRESS NOTE ADULT - ASSESSMENT
88 yrs M  PMHx of A-fib on Coumadin s/p pacemaker, HTN, HLD , CKD  ,Recent C diff Infection presenting to the ED s/p fall last night ,fever and  Persistent diarrhoea  .     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  -In the ED received Vancomycin and cefepime   -In the ED 3.2 liter bolus  -continue PO Vancomycin and flagyl  -ID recommendations appreciated  -Blood cultures x 2 follow up     # Atrial fibrillation   - continue amiodarone ,Metoprolol (with hold parameters)  - INR 1.41 today  - Warfarin 2mg tonight (home dose 1 mg daily)    # FRANCINE on CKD 3  - stable  -No NSAIDs  -Baseline 1.6-1.8      #Hypothyroidism   -continue synthroid    #Gout  -continue Allopurinol     #Weakness likely secondary to Deconditioning and or Sepsis and or Diarrhea  -Physical therapy eval    #HTN  -DASH diet  -continue Metoprolol     #HLD  -continue  statin    #Dispo: continued hospitalization

## 2019-06-26 NOTE — PROGRESS NOTE ADULT - ASSESSMENT
88 yrs M  PMHx of A-fib on Coumadin s/p pacemaker, HTN, HLD , CKD  ,Recent C diff Infection presenting to the ED s/p fall last night ,fever and  Persistent diarrhea  . Patient reports that he using the bathroom and when he got up to walk away, his legs felt weak and he slipped and fell hit his Left knee. As per the patient he have been having diarrhoea for the past 3 days, about 3 episodes /day. He was admitted at [facility] for the Pneumonia and as per the chart note ?UTI in the month of May 2019 and discharged to the Rehab .While he was in the Rehab he had diagnosed with the C diff and was treated with the Vancomycin and in addition he had Cellulitis of face and leg. Here febrile to 102, wbc ct 21, was given oral vancomycin/IV flagyl/fluids and vanco/cefepime.      1. diarrhea. fever. leukocytosis. recurrent CDAD  - slowly improving c diff positive   - on oral vancomycin [id number] #2  - on IV flagyl [id number] #2  - blood cx no growth, urine cx contaminated  - continue with above cdad tx  - probiotics BID  - monitor temps  - supportive care  - fu cbc    2. other issues -care per medicine

## 2019-06-26 NOTE — DISCHARGE NOTE NURSING/CASE MANAGEMENT/SOCIAL WORK - NSDPDISTO_GEN_ALL_CORE
Home with home care/[facility] / [facility]  [phone] [facility] / [facility]  [phone]/Sub-Acute rehab

## 2019-06-26 NOTE — DISCHARGE NOTE NURSING/CASE MANAGEMENT/SOCIAL WORK - NSSCNAMETXT_GEN_ALL_CORE
[facility] / [facility]

## 2019-06-26 NOTE — PROGRESS NOTE ADULT - SUBJECTIVE AND OBJECTIVE BOX
Date of service: 19 @ 11:58    pt seen and examined  feeling better today  less frequency diarrhea  still loose bms   afebrile    ROS: no fever or chills; denies dizziness, no HA, no SOB or cough, no  constipation; no dysuria, no urinary frequency, no legs pain, no rashes    MEDICATIONS  (STANDING):  allopurinol 300 milliGRAM(s) Oral daily  amiodarone    Tablet 200 milliGRAM(s) Oral daily  atorvastatin 20 milliGRAM(s) Oral at bedtime  folic acid 1 milliGRAM(s) Oral daily  lactobacillus acidophilus 1 Tablet(s) Oral two times a day  levothyroxine 125 MICROGram(s) Oral daily  metoprolol tartrate 25 milliGRAM(s) Oral two times a day  metroNIDAZOLE  IVPB      metroNIDAZOLE  IVPB 500 milliGRAM(s) IV Intermittent every 8 hours  multivitamin 1 Tablet(s) Oral daily  potassium chloride    Tablet ER 40 milliEquivalent(s) Oral every 4 hours  vancomycin    Solution 125 milliGRAM(s) Oral every 6 hours  warfarin 2 milliGRAM(s) Oral daily    Vital Signs Last 24 Hrs  T(C): 36.8 (2019 11:02), Max: 37.4 (2019 17:18)  T(F): 98.3 (2019 11:02), Max: 99.3 (2019 17:18)  HR: 66 (2019 11:02) (60 - 68)  BP: 125/51 (2019 11:02) (100/44 - 125/51)  BP(mean): --  RR: 17 (2019 11:02) (17 - 18)  SpO2: 100% (2019 11:02) (97% - 100%)        PE:  Constitutional: frail looking  HEENT: NC/AT, EOMI, PERRLA, conjunctivae clear; ears and nose atraumatic; pharynx benign  Neck: supple; thyroid not palpable  Back: no tenderness  Respiratory: respiratory effort normal; clear to auscultation  Cardiovascular: S1S2 regular, no murmurs  Abdomen: soft, not tender, not distended, positive BS; liver and spleen WNL  Genitourinary: no suprapubic tenderness  Lymphatic: no LN palpable  Musculoskeletal: no muscle tenderness, no joint swelling or tenderness  Extremities: no pedal edema  Neurological/ Psychiatric:  moving all extremities  Skin: no rashes; no palpable lesions    Labs: all available labs reviewed               Urinalysis Basic - ( 2019 16:45 )    Color: Yellow / Appearance: Clear / S.010 / pH: x  Gluc: x / Ketone: Negative  / Bili: Negative / Urobili: Negative mg/dL   Blood: x / Protein: Negative mg/dL / Nitrite: Negative   Leuk Esterase: Small / RBC: Negative /HPF / WBC 6-10   Sq Epi: x / Non Sq Epi: Few / Bacteria: Occasional        Radiology: all available radiological tests reviewed    EXAM:  XR CHEST PORTABLE IMMED 1V                            PROCEDURE DATE:  2019          INTERPRETATION:  Portable chest radiograph dated 2019.    COMPARISON: 2017.    CLINICAL INFORMATION: Sepsis.  FINDINGS:    The airway is midline.  There are no airspace consolidations. Chronic left upper lobe scarring is   again noted.  There is no pleural effusion or pneumothorax.   The cardiac silhouette is normal size. Continued application of the left   subclavian vein pacemaker, leads are in the right heart.  The bones are normal.     IMPRESSION:    No acute cardiopulmonary findings.      Advanced directives addressed: full resuscitation

## 2019-06-26 NOTE — DISCHARGE NOTE NURSING/CASE MANAGEMENT/SOCIAL WORK - NSDCDPATPORTLINK_GEN_ALL_CORE
You can access the [facility], offered by [facility], by registering with the following website: [email or web address]

## 2019-06-26 NOTE — PROGRESS NOTE ADULT - SUBJECTIVE AND OBJECTIVE BOX
Pt has been seen and examined with FP resident, resident supervised agree with a/p       Patient is a 88y old  Male who presents with a chief complaint of Fever ,fall ,diarrhoea (26 Jun 2019 11:57)        PHYSICAL EXAM:  Vital Signs Last 24 Hrs  T(C): 36.8 (26 Jun 2019 11:02), Max: 37.4 (25 Jun 2019 17:18)  T(F): 98.3 (26 Jun 2019 11:02), Max: 99.3 (25 Jun 2019 17:18)  HR: 66 (26 Jun 2019 11:02) (60 - 68)  BP: 125/51 (26 Jun 2019 11:02) (100/44 - 125/51)  BP(mean): --  RR: 17 (26 Jun 2019 11:02) (17 - 18)  SpO2: 100% (26 Jun 2019 11:02) (97% - 100%)  general- comfortable   -rs-aeeb,cta  -cvs-s1s2 normal   -p/a-soft,bs+  -extremity- no asymmetrical swelling noted   -cns- non focal         A/P    #ct abx for CDAD  -supportive care

## 2019-06-27 LAB
ANION GAP SERPL CALC-SCNC: 5 MMOL/L — SIGNIFICANT CHANGE UP (ref 5–17)
BUN SERPL-MCNC: 43 MG/DL — HIGH (ref 7–23)
CALCIUM SERPL-MCNC: 8.1 MG/DL — LOW (ref 8.5–10.1)
CHLORIDE SERPL-SCNC: 113 MMOL/L — HIGH (ref 96–108)
CO2 SERPL-SCNC: 25 MMOL/L — SIGNIFICANT CHANGE UP (ref 22–31)
CREAT SERPL-MCNC: 1.8 MG/DL — HIGH (ref 0.5–1.3)
GLUCOSE SERPL-MCNC: 123 MG/DL — HIGH (ref 70–99)
HCT VFR BLD CALC: 33 % — LOW (ref 39–50)
HGB BLD-MCNC: 10.4 G/DL — LOW (ref 13–17)
INR BLD: 1.44 RATIO — HIGH (ref 0.88–1.16)
MCHC RBC-ENTMCNC: 29.1 PG — SIGNIFICANT CHANGE UP (ref 27–34)
MCHC RBC-ENTMCNC: 31.5 GM/DL — LOW (ref 32–36)
MCV RBC AUTO: 92.2 FL — SIGNIFICANT CHANGE UP (ref 80–100)
PLATELET # BLD AUTO: 170 K/UL — SIGNIFICANT CHANGE UP (ref 150–400)
POTASSIUM SERPL-MCNC: 3.9 MMOL/L — SIGNIFICANT CHANGE UP (ref 3.5–5.3)
POTASSIUM SERPL-SCNC: 3.9 MMOL/L — SIGNIFICANT CHANGE UP (ref 3.5–5.3)
PROTHROM AB SERPL-ACNC: 16.2 SEC — HIGH (ref 10–12.9)
RBC # BLD: 3.58 M/UL — LOW (ref 4.2–5.8)
RBC # FLD: 16.9 % — HIGH (ref 10.3–14.5)
SODIUM SERPL-SCNC: 143 MMOL/L — SIGNIFICANT CHANGE UP (ref 135–145)
WBC # BLD: 9.35 K/UL — SIGNIFICANT CHANGE UP (ref 3.8–10.5)
WBC # FLD AUTO: 9.35 K/UL — SIGNIFICANT CHANGE UP (ref 3.8–10.5)

## 2019-06-27 RX ORDER — ENOXAPARIN SODIUM 100 MG/ML
30 INJECTION SUBCUTANEOUS DAILY
Refills: 0 | Status: DISCONTINUED | OUTPATIENT
Start: 2019-06-27 | End: 2019-06-29

## 2019-06-27 RX ORDER — WARFARIN SODIUM 2.5 MG/1
3 TABLET ORAL AT BEDTIME
Refills: 0 | Status: COMPLETED | OUTPATIENT
Start: 2019-06-27 | End: 2019-06-27

## 2019-06-27 RX ADMIN — Medication 125 MILLIGRAM(S): at 05:50

## 2019-06-27 RX ADMIN — Medication 125 MILLIGRAM(S): at 17:53

## 2019-06-27 RX ADMIN — Medication 1 MILLIGRAM(S): at 13:26

## 2019-06-27 RX ADMIN — Medication 1 TABLET(S): at 05:50

## 2019-06-27 RX ADMIN — ATORVASTATIN CALCIUM 20 MILLIGRAM(S): 80 TABLET, FILM COATED ORAL at 20:53

## 2019-06-27 RX ADMIN — Medication 125 MICROGRAM(S): at 05:50

## 2019-06-27 RX ADMIN — Medication 1 TABLET(S): at 17:52

## 2019-06-27 RX ADMIN — AMIODARONE HYDROCHLORIDE 200 MILLIGRAM(S): 400 TABLET ORAL at 05:50

## 2019-06-27 RX ADMIN — Medication 125 MILLIGRAM(S): at 23:26

## 2019-06-27 RX ADMIN — Medication 1 TABLET(S): at 13:26

## 2019-06-27 RX ADMIN — WARFARIN SODIUM 3 MILLIGRAM(S): 2.5 TABLET ORAL at 20:53

## 2019-06-27 RX ADMIN — Medication 100 MILLIGRAM(S): at 05:50

## 2019-06-27 RX ADMIN — ENOXAPARIN SODIUM 30 MILLIGRAM(S): 100 INJECTION SUBCUTANEOUS at 13:26

## 2019-06-27 RX ADMIN — Medication 100 MILLIGRAM(S): at 15:09

## 2019-06-27 RX ADMIN — Medication 300 MILLIGRAM(S): at 13:26

## 2019-06-27 RX ADMIN — Medication 125 MILLIGRAM(S): at 15:08

## 2019-06-27 RX ADMIN — Medication 25 MILLIGRAM(S): at 05:50

## 2019-06-27 RX ADMIN — Medication 25 MILLIGRAM(S): at 17:52

## 2019-06-27 RX ADMIN — Medication 100 MILLIGRAM(S): at 20:52

## 2019-06-27 NOTE — PROGRESS NOTE ADULT - SUBJECTIVE AND OBJECTIVE BOX
Pt has been seen and examined with FP resident, resident supervised agree with a/p       Patient is a 88y old  Male who presents with a chief complaint of Fever ,fall ,diarrhoea (27 Jun 2019 11:09)        PHYSICAL EXAM:  Vital Signs Last 24 Hrs  T(C): 36.7 (27 Jun 2019 17:32), Max: 36.7 (27 Jun 2019 17:32)  T(F): 98.1 (27 Jun 2019 17:32), Max: 98.1 (27 Jun 2019 17:32)  HR: 61 (27 Jun 2019 17:32) (59 - 61)  BP: 129/58 (27 Jun 2019 17:32) (104/45 - 129/58)  BP(mean): --  RR: 16 (27 Jun 2019 17:32) (16 - 18)  SpO2: 98% (27 Jun 2019 17:32) (98% - 100%)  general- comfortable   -rs-aeeb,cta  -cvs-s1s2 normal   -p/a-soft,bs+  -extremity- no asymmetrical swelling noted   -cns- non focal       A/P    #ct vanco and flagyl     #improving

## 2019-06-27 NOTE — PROGRESS NOTE ADULT - SUBJECTIVE AND OBJECTIVE BOX
Date of service: 19 @ 11:09    pt seen and examined  feeling better, 3 loose stools in 24 hrs  more formed than prior  no abd pain but has abd distension  afebrile    ROS: no fever or chills; denies dizziness, no HA, no SOB or cough, no  constipation; no dysuria, no urinary frequency, no legs pain, no rashes      MEDICATIONS  (STANDING):  allopurinol 300 milliGRAM(s) Oral daily  amiodarone    Tablet 200 milliGRAM(s) Oral daily  atorvastatin 20 milliGRAM(s) Oral at bedtime  folic acid 1 milliGRAM(s) Oral daily  lactobacillus acidophilus 1 Tablet(s) Oral two times a day  levothyroxine 125 MICROGram(s) Oral daily  metoprolol tartrate 25 milliGRAM(s) Oral two times a day  metroNIDAZOLE  IVPB      metroNIDAZOLE  IVPB 500 milliGRAM(s) IV Intermittent every 8 hours  multivitamin 1 Tablet(s) Oral daily  vancomycin    Solution 125 milliGRAM(s) Oral every 6 hours  warfarin 2 milliGRAM(s) Oral daily    Vital Signs Last 24 Hrs  T(C): 36.6 (2019 05:19), Max: 37 (2019 16:54)  T(F): 97.8 (2019 05:19), Max: 98.6 (2019 16:54)  HR: 61 (2019 05:19) (61 - 61)  BP: 120/45 (2019 05:19) (116/65 - 120/45)  BP(mean): --  RR: 18 (2019 05:19) (18 - 18)  SpO2: 98% (2019 05:19) (98% - 100%)      PE:  Constitutional: frail looking  HEENT: NC/AT, EOMI, PERRLA, conjunctivae clear; ears and nose atraumatic; pharynx benign  Neck: supple; thyroid not palpable  Back: no tenderness  Respiratory: respiratory effort normal; clear to auscultation  Cardiovascular: S1S2 regular, no murmurs  Abdomen: soft, not tender, not distended, positive BS; liver and spleen WNL  Genitourinary: no suprapubic tenderness  Lymphatic: no LN palpable  Musculoskeletal: no muscle tenderness, no joint swelling or tenderness  Extremities: no pedal edema  Neurological/ Psychiatric:  moving all extremities  Skin: no rashes; no palpable lesions    Labs: all available labs reviewed               Urinalysis Basic - ( 2019 16:45 )    Color: Yellow / Appearance: Clear / S.010 / pH: x  Gluc: x / Ketone: Negative  / Bili: Negative / Urobili: Negative mg/dL   Blood: x / Protein: Negative mg/dL / Nitrite: Negative   Leuk Esterase: Small / RBC: Negative /HPF / WBC 6-10   Sq Epi: x / Non Sq Epi: Few / Bacteria: Occasional    Culture - Urine (19 @ 16:45)    Specimen Source: .Urine Clean Catch (Midstream)    Culture Results:   >=3 organisms. Probable collection contamination.    Culture - Blood (19 @ 15:15)    Specimen Source: .Blood Blood-Peripheral    Culture Results:   No growth to date.      Radiology: all available radiological tests reviewed    EXAM:  CT ABDOMEN AND PELVIS OC                            PROCEDURE DATE:  2019          INTERPRETATION:  CLINICAL STATEMENT: diarrhea, recently treated for C.   difficile    TECHNIQUE: CT of the abdomen and pelvis was performed without IV   contrast. Oral contrast was administered.    COMPARISON: 2015    FINDINGS:    The lower chest is remarkable for pacemaker wires. There are minimal   bilateral pleural effusions    The liver is unremarkable. The gallbladder is not identified and may be   surgically absent. Dropped gallstones are suspected in the right abdomen.    The spleen, pancreas and adrenal glands are unremarkable.The kidneys   demonstrate small cysts. Additionally there is an indeterminate exophytic   lesion arising fromthe lateral lower pole of the right kidney which was   present on the prior study and appears similar or smaller consistent with   a benign etiology. The fluid-filled bladder appears intact.    There is no bowel obstruction. There is no CT evidence of appendicitis.   There is severe wall thickening of the colon diffusely which is   compatible with pseudomembranous colitis    There is no intraperitoneal free air.  There is no free fluid. The aorta   is not aneurysmal. There is atherosclerotic calcification of the   abdominal aorta and its branches.    There is no significant abdominal, retroperitoneal or pelvic   lymphadenopathy. The pelvic structures are unremarkable.    The osseous structures demonstrate osteopenia and degenerative changes.   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 There is heterotopic   ossification about the left hip..    IMPRESSION:    Severe diffuse colonic wall thickening may be secondary to C. difficile.   Recommend clinical and laboratory correlation.  Incidentally seen are bilateral renal cysts  Gallbladder has been removed since the prior study. Dropped gallstones   are suspected in the right abdomen      EXAM:  XR CHEST PORTABLE IMMED 1V                            PROCEDURE DATE:  2019            INTERPRETATION:  Portable chest radiograph dated 2019.    COMPARISON: 2017.    CLINICAL INFORMATION: Sepsis.  FINDINGS:    The airway is midline.  There are no airspace consolidations. Chronic left upper lobe scarring is   again noted.  There is no pleural effusion or pneumothorax.   The cardiac silhouette is normal size. Continued application of the left   subclavian vein pacemaker, leads are in the right heart.  The bones are normal.     IMPRESSION:    No acute cardiopulmonary findings.      Advanced directives addressed: full resuscitation

## 2019-06-27 NOTE — PROGRESS NOTE ADULT - ASSESSMENT
88 yrs M  PMHx of A-fib on Coumadin s/p pacemaker, HTN, HLD , CKD  ,Recent C diff Infection presenting to the ED s/p fall last night ,fever and  Persistent diarrhoea  .     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  -In the ED received Vancomycin and cefepime   -In the ED 3.2 liter bolus  -continue PO Vancomycin and flagyl  -ID recommendations appreciated  -Blood cultures x 2 follow up     # Atrial fibrillation   - continue amiodarone, Metoprolol (with hold parameters)  - INR 1.44 today  - Lovenox 30mg daily started until INR>2  - Warfarin 3mg tonight (home dose 1 mg daily)    # FRANCINE on CKD 3  - stable  -No NSAIDs  -Baseline 1.6-1.8    #Hypothyroidism   -continue synthroid    #Gout  -continue Allopurinol     #Weakness likely secondary to Deconditioning and or Sepsis and or Diarrhea  -Physical therapy eval    #HTN  -DASH diet  -continue Metoprolol     #HLD  -continue  statin    #Dispo: continued hospitalization 88 yrs M  PMHx of A-fib on Coumadin s/p pacemaker, HTN, HLD , CKD  ,Recent C diff Infection presenting to the ED s/p fall last night ,fever and  Persistent diarrhoea  .     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  -In the ED received Vancomycin and cefepime   -In the ED 3.2 liter bolus  -continue PO Vancomycin and flagyl  -ID recommendations appreciated  -Blood cultures x 2 - NGTD    # Atrial fibrillation   - continue amiodarone, Metoprolol (with hold parameters)  - INR 1.44 today  - Lovenox 30mg daily started until INR>2  - Warfarin 3mg tonight (home dose 1 mg daily)    # FRANCINE on CKD 3  - stable  -No NSAIDs  -Baseline 1.6-1.8    #Hypothyroidism   -continue synthroid    #Gout  -continue Allopurinol     #Weakness likely secondary to Deconditioning and or Sepsis and or Diarrhea  -Physical therapy recommendations appreciated    #HTN  -BP well controlled  - DASH diet  -continue Metoprolol     #HLD  -continue  statin    #Dispo: continued hospitalization

## 2019-06-27 NOTE — PROGRESS NOTE ADULT - SUBJECTIVE AND OBJECTIVE BOX
SUBJECTIVE:  88 yrs M  PMHx of A-fib on Coumadin s/p pacemaker, HTN, HLD , CKD  ,Recent C diff Infection presenting to the ED s/p fall last night ,fever and  Persistent diarrhoea  .   Patient reports that he using the bathroom and when he got up to walk away, his legs felt weak and he slipped and fell hit his Left knee . Family helped pt off the ground and pt was able to ambulate with assistance. As per the patient he have been having diarrhoea for the past 3 days , about 3 episodes /day .Describes as he used a "stool softener "   He was admitted at [facility] for the Pneumonia and as per the chart note ?UTI in the month of May 2019 and discharged to the Rehab .While he was in the Rehab he had diagnosed with the C diff and was treated with the Vancomycin and in addition he had Cellulitis of face and leg .Today he had a fever of 102 F and was making no sense and family both him to the hospital .   Denies head trauma , Loss of consciousness , cough, sorethroat  ,URI symptoms , abd pain, blood in stools ,headache ,change  in the urination including dysuria .  Baseline he uses a walker ,has limited mobility for the past few years .    In ED patient had fever of 101.7 and received 3.2 liter fluid and vancomycin and Cefepie    6/27/19: Patient seen and examined at bedside with medical team. This morning, he states that his bowel movements have decreased in frequency, however he is still had 2 bowel movements in AM prior to interview. He states his appetite is improved.     REVIEW OF SYSTEMS:  CONSTITUTIONAL: No weakness, fevers or chills  EYES/ENT: No visual changes;  No vertigo or throat pain   NECK: No pain or stiffness  RESPIRATORY: No cough, wheezing, hemoptysis; No shortness of breath  CARDIOVASCULAR: No chest pain or palpitations  GASTROINTESTINAL: No abdominal or epigastric pain. No nausea, vomiting, or hematemesis; + diarrhea. No melena or hematochezia.  GENITOURINARY: No dysuria, frequency or hematuria  NEUROLOGICAL: No numbness or weakness  SKIN: No itching, burning, rashes, or lesions   All other review of systems is negative unless indicated above    Vital Signs Last 24 Hrs  T(C): 36.7 (27 Jun 2019 17:32), Max: 36.7 (27 Jun 2019 17:32)  T(F): 98.1 (27 Jun 2019 17:32), Max: 98.1 (27 Jun 2019 17:32)  HR: 61 (27 Jun 2019 17:32) (59 - 61)  BP: 129/58 (27 Jun 2019 17:32) (104/45 - 129/58)  BP(mean): --  RR: 16 (27 Jun 2019 17:32) (16 - 18)  SpO2: 98% (27 Jun 2019 17:32) (98% - 100%)    I&O's Summary    26 Jun 2019 07:01  -  27 Jun 2019 07:00  --------------------------------------------------------  IN: 0 mL / OUT: 1 mL / NET: -1 mL    27 Jun 2019 07:01  -  27 Jun 2019 21:26  --------------------------------------------------------  IN: 100 mL / OUT: 0 mL / NET: 100 mL        CAPILLARY BLOOD GLUCOSE          PHYSICAL EXAM:  Constitutional: NAD, awake and alert, elderly gentelman, mildly disheveled, sitting upright in chair  HEENT: PERR, EOMI, Normal Hearing, MMM  Neck: Soft and supple, No LAD, No JVD  Respiratory: Breath sounds are clear bilaterally, No wheezing, rales or rhonchi  Cardiovascular: S1 and S2, regular rate and rhythm, no Murmurs, gallops or rubs  Gastrointestinal: Bowel Sounds present, soft, nontender, nondistended, no guarding, no rebound  Extremities: mild peipheral edema  Vascular: 2+ peripheral pulses  Neurological: A/O x 3, no focal deficits  Musculoskeletal: 4/5 strength b/l upper and lower extremities  Skin: No rashes    MEDICATIONS:  MEDICATIONS  (STANDING):  allopurinol 300 milliGRAM(s) Oral daily  amiodarone    Tablet 200 milliGRAM(s) Oral daily  atorvastatin 20 milliGRAM(s) Oral at bedtime  enoxaparin Injectable 30 milliGRAM(s) SubCutaneous daily  folic acid 1 milliGRAM(s) Oral daily  lactobacillus acidophilus 1 Tablet(s) Oral two times a day  levothyroxine 125 MICROGram(s) Oral daily  metoprolol tartrate 25 milliGRAM(s) Oral two times a day  metroNIDAZOLE  IVPB      metroNIDAZOLE  IVPB 500 milliGRAM(s) IV Intermittent every 8 hours  multivitamin 1 Tablet(s) Oral daily  vancomycin    Solution 125 milliGRAM(s) Oral every 6 hours      LABS: All Labs Reviewed:                        10.4   9.35  )-----------( 170      ( 27 Jun 2019 13:37 )             33.0     06-27    143  |  113<H>  |  43<H>  ----------------------------<  123<H>  3.9   |  25  |  1.80<H>    Ca    8.1<L>      27 Jun 2019 07:26  Mg     2.0     06-26      PT/INR - ( 27 Jun 2019 07:26 )   PT: 16.2 sec;   INR: 1.44 ratio             Culture - Urine (06.24.19 @ 16:45)    Specimen Source: .Urine Clean Catch (Midstream)    Culture Results:   >=3 organisms. Probable collection contamination.    Culture - Blood (06.24.19 @ 15:15)    Specimen Source: .Blood Blood-Peripheral    Culture Results:   No growth to date.      Culture - Blood (06.24.19 @ 15:15)    Specimen Source: .Blood Blood-Peripheral    Culture Results:   No growth to date.

## 2019-06-27 NOTE — PROGRESS NOTE ADULT - ASSESSMENT
88 yrs M  PMHx of A-fib on Coumadin s/p pacemaker, HTN, HLD , CKD  ,Recent C diff Infection presenting to the ED s/p fall last night ,fever and  Persistent diarrhea  . Patient reports that he using the bathroom and when he got up to walk away, his legs felt weak and he slipped and fell hit his Left knee. As per the patient he have been having diarrhoea for the past 3 days, about 3 episodes /day. He was admitted at [facility] for the Pneumonia and as per the chart note ?UTI in the month of May 2019 and discharged to the Rehab .While he was in the Rehab he had diagnosed with the C diff and was treated with the Vancomycin and in addition he had Cellulitis of face and leg. Here febrile to 102, wbc ct 21, was given oral vancomycin/IV flagyl/fluids and vanco/cefepime.      1. diarrhea. fever. leukocytosis. recurrent CDAD  - slowly improving c diff positive f/u cbc  - CT abd/pelvis reviewed  - on oral vancomycin [id number] #3  - on IV flagyl [id number] #3  - blood cx no growth, urine cx contaminated  - continue with above cdad tx  - probiotics BID  - monitor temps  - supportive care  - fu cbc    2. other issues -care per medicine

## 2019-06-28 LAB
ANION GAP SERPL CALC-SCNC: 11 MMOL/L — SIGNIFICANT CHANGE UP (ref 5–17)
BUN SERPL-MCNC: 36 MG/DL — HIGH (ref 7–23)
CALCIUM SERPL-MCNC: 8.6 MG/DL — SIGNIFICANT CHANGE UP (ref 8.5–10.1)
CHLORIDE SERPL-SCNC: 113 MMOL/L — HIGH (ref 96–108)
CO2 SERPL-SCNC: 22 MMOL/L — SIGNIFICANT CHANGE UP (ref 22–31)
CREAT SERPL-MCNC: 1.39 MG/DL — HIGH (ref 0.5–1.3)
GLUCOSE SERPL-MCNC: 100 MG/DL — HIGH (ref 70–99)
HCT VFR BLD CALC: 32.7 % — LOW (ref 39–50)
HGB BLD-MCNC: 10 G/DL — LOW (ref 13–17)
INR BLD: 1.56 RATIO — HIGH (ref 0.88–1.16)
MCHC RBC-ENTMCNC: 28.1 PG — SIGNIFICANT CHANGE UP (ref 27–34)
MCHC RBC-ENTMCNC: 30.6 GM/DL — LOW (ref 32–36)
MCV RBC AUTO: 91.9 FL — SIGNIFICANT CHANGE UP (ref 80–100)
PLATELET # BLD AUTO: 197 K/UL — SIGNIFICANT CHANGE UP (ref 150–400)
POTASSIUM SERPL-MCNC: 3.9 MMOL/L — SIGNIFICANT CHANGE UP (ref 3.5–5.3)
POTASSIUM SERPL-SCNC: 3.9 MMOL/L — SIGNIFICANT CHANGE UP (ref 3.5–5.3)
PROTHROM AB SERPL-ACNC: 17.5 SEC — HIGH (ref 10–12.9)
RBC # BLD: 3.56 M/UL — LOW (ref 4.2–5.8)
RBC # FLD: 17.1 % — HIGH (ref 10.3–14.5)
SODIUM SERPL-SCNC: 146 MMOL/L — HIGH (ref 135–145)
WBC # BLD: 6.66 K/UL — SIGNIFICANT CHANGE UP (ref 3.8–10.5)
WBC # FLD AUTO: 6.66 K/UL — SIGNIFICANT CHANGE UP (ref 3.8–10.5)

## 2019-06-28 RX ORDER — SODIUM CHLORIDE 9 MG/ML
1000 INJECTION, SOLUTION INTRAVENOUS
Refills: 0 | Status: COMPLETED | OUTPATIENT
Start: 2019-06-28 | End: 2019-06-28

## 2019-06-28 RX ORDER — WARFARIN SODIUM 2.5 MG/1
3 TABLET ORAL AT BEDTIME
Refills: 0 | Status: DISCONTINUED | OUTPATIENT
Start: 2019-06-28 | End: 2019-06-29

## 2019-06-28 RX ADMIN — Medication 125 MILLIGRAM(S): at 06:00

## 2019-06-28 RX ADMIN — AMIODARONE HYDROCHLORIDE 200 MILLIGRAM(S): 400 TABLET ORAL at 06:00

## 2019-06-28 RX ADMIN — Medication 25 MILLIGRAM(S): at 17:44

## 2019-06-28 RX ADMIN — ATORVASTATIN CALCIUM 20 MILLIGRAM(S): 80 TABLET, FILM COATED ORAL at 22:56

## 2019-06-28 RX ADMIN — Medication 1 MILLIGRAM(S): at 12:19

## 2019-06-28 RX ADMIN — Medication 125 MILLIGRAM(S): at 23:33

## 2019-06-28 RX ADMIN — Medication 125 MILLIGRAM(S): at 12:20

## 2019-06-28 RX ADMIN — Medication 100 MILLIGRAM(S): at 06:50

## 2019-06-28 RX ADMIN — WARFARIN SODIUM 3 MILLIGRAM(S): 2.5 TABLET ORAL at 22:56

## 2019-06-28 RX ADMIN — Medication 1 TABLET(S): at 17:44

## 2019-06-28 RX ADMIN — Medication 25 MILLIGRAM(S): at 06:00

## 2019-06-28 RX ADMIN — Medication 1 TABLET(S): at 06:00

## 2019-06-28 RX ADMIN — Medication 100 MILLIGRAM(S): at 22:56

## 2019-06-28 RX ADMIN — Medication 100 MILLIGRAM(S): at 14:10

## 2019-06-28 RX ADMIN — Medication 125 MILLIGRAM(S): at 17:44

## 2019-06-28 RX ADMIN — Medication 125 MICROGRAM(S): at 06:00

## 2019-06-28 RX ADMIN — Medication 1 TABLET(S): at 12:19

## 2019-06-28 RX ADMIN — ENOXAPARIN SODIUM 30 MILLIGRAM(S): 100 INJECTION SUBCUTANEOUS at 12:23

## 2019-06-28 RX ADMIN — Medication 300 MILLIGRAM(S): at 12:19

## 2019-06-28 RX ADMIN — SODIUM CHLORIDE 75 MILLILITER(S): 9 INJECTION, SOLUTION INTRAVENOUS at 14:22

## 2019-06-28 NOTE — PROGRESS NOTE ADULT - ASSESSMENT
88 yrs M  PMHx of A-fib on Coumadin s/p pacemaker, HTN, HLD , CKD  ,Recent C diff Infection presenting to the ED s/p fall last night ,fever and  Persistent diarrhea  . Patient reports that he using the bathroom and when he got up to walk away, his legs felt weak and he slipped and fell hit his Left knee. As per the patient he have been having diarrhoea for the past 3 days, about 3 episodes /day. He was admitted at [facility] for the Pneumonia and as per the chart note ?UTI in the month of May 2019 and discharged to the Rehab .While he was in the Rehab he had diagnosed with the C diff and was treated with the Vancomycin and in addition he had Cellulitis of face and leg. Here febrile to 102, wbc ct 21, was given oral vancomycin/IV flagyl/fluids and vanco/cefepime.      1. diarrhea. fever. leukocytosis. recurrent CDAD  - slowly improving c diff positive f/u cbc  - CT abd/pelvis reviewed  - on oral vancomycin [id number] #[id number]  - on IV flagyl [id number] #4  - blood cx no growth, urine cx contaminated  - continue with above cdad tx  - dc IV flagyl if continues to improve in next 24 hrs  - plan for PO vancomycin taper on dc  - probiotics BID  - monitor temps  - supportive care  - fu cbc    2. other issues -care per medicine

## 2019-06-28 NOTE — PROGRESS NOTE ADULT - SUBJECTIVE AND OBJECTIVE BOX
SUBJECTIVE: 88 yrs M  PMHx of A-fib on Coumadin s/p pacemaker, HTN, HLD , CKD  ,Recent C diff Infection presenting to the ED s/p fall last night ,fever and  Persistent diarrhoea  .   Patient reports that he using the bathroom and when he got up to walk away, his legs felt weak and he slipped and fell hit his Left knee . Family helped pt off the ground and pt was able to ambulate with assistance. As per the patient he have been having diarrhoea for the past 3 days , about 3 episodes /day .Describes as he used a "stool softener "   He was admitted at [facility] for the Pneumonia and as per the chart note ?UTI in the month of May 2019 and discharged to the Rehab .While he was in the Rehab he had diagnosed with the C diff and was treated with the Vancomycin and in addition he had Cellulitis of face and leg .Today he had a fever of 102 F and was making no sense and family both him to the hospital .   Denies head trauma , Loss of consciousness , cough, sorethroat  ,URI symptoms , abd pain, blood in stools ,headache ,change  in the urination including dysuria .  Baseline he uses a walker ,has limited mobility for the past few years .    In ED patient had fever of 101.7 and received 3.2 liter fluid and vancomycin and Cefepime. Patient tested positve for C. diff toxin and was started on oral vancomycin and Flagyl this hospitalization.     6/29: No acute overnight events noted. This morning patient states that he has 3 bowel movements overnight but they are more formed. At this time, patient tolerating diet. He denies abdominal pain or fevers this AM.       REVIEW OF SYSTEMS:  CONSTITUTIONAL: + weakness (improving), no fevers or chills  EYES/ENT: No visual changes;  No vertigo or throat pain   NECK: No pain or stiffness  RESPIRATORY: No cough, wheezing, hemoptysis; No shortness of breath  CARDIOVASCULAR: No chest pain or palpitations  GASTROINTESTINAL: No abdominal or epigastric pain. No nausea, vomiting, or hematemesis; + diarrhea. (improving). No melena or hematochezia.  GENITOURINARY: No dysuria, frequency or hematurias  SKIN: No itching, burning, rashes, or lesions   All other review of systems is negative unless indicated above    Vital Signs Last 24 Hrs  T(C): 36.5 (28 Jun 2019 17:40), Max: 36.7 (28 Jun 2019 05:45)  T(F): 97.7 (28 Jun 2019 17:40), Max: 98.1 (28 Jun 2019 12:00)  HR: 64 (28 Jun 2019 17:40) (59 - 66)  BP: 128/61 (28 Jun 2019 17:40) (110/51 - 128/61)  RR: 18 (28 Jun 2019 17:40) (18 - 19)  SpO2: 99% (28 Jun 2019 17:40) (99% - 99%)    I&O's Summary    27 Jun 2019 07:01  -  28 Jun 2019 07:00  --------------------------------------------------------  IN: 100 mL / OUT: 0 mL / NET: 100 mL    28 Jun 2019 07:01  -  29 Jun 2019 03:48  --------------------------------------------------------  IN: 100 mL / OUT: 0 mL / NET: 100 mL    Constitutional: NAD, awake and alert, elderly gentelman, mildly disheveled, sitting upright in bed  HEENT: PERR, EOMI, Normal Hearing, MMM  Neck: Soft and supple, No LAD, No JVD  Respiratory: Breath sounds are clear bilaterally, No wheezing, rales or rhonchi  Cardiovascular: S1 and S2, regular rate and rhythm, no Murmurs, gallops or rubs  Gastrointestinal: Bowel Sounds present, soft, nontender, nondistended, no guarding, no rebound  Extremities: mild peripheral edmema b/l, dried skin noted b/l  Vascular: 2+ peripheral pulses  Neurological: A/O x 3, no focal deficits  Musculoskeletal: 4/5 strength b/l upper and lower extremities  Skin: No rashes    MEDICATIONS:  MEDICATIONS  (STANDING):  allopurinol 300 milliGRAM(s) Oral daily  amiodarone    Tablet 200 milliGRAM(s) Oral daily  atorvastatin 20 milliGRAM(s) Oral at bedtime  enoxaparin Injectable 30 milliGRAM(s) SubCutaneous daily  folic acid 1 milliGRAM(s) Oral daily  lactobacillus acidophilus 1 Tablet(s) Oral two times a day  levothyroxine 125 MICROGram(s) Oral daily  metoprolol tartrate 25 milliGRAM(s) Oral two times a day  metroNIDAZOLE  IVPB      metroNIDAZOLE  IVPB 500 milliGRAM(s) IV Intermittent every 8 hours  multivitamin 1 Tablet(s) Oral daily  vancomycin    Solution 125 milliGRAM(s) Oral every 6 hours  warfarin 3 milliGRAM(s) Oral at bedtime      LABS: All Labs Reviewed:                        10.0   6.66  )-----------( 197      ( 28 Jun 2019 09:37 )             32.7     06-28    146<H>  |  113<H>  |  36<H>  ----------------------------<  100<H>  3.9   |  22  |  1.39<H>    Ca    8.6      28 Jun 2019 09:37      PT/INR - ( 28 Jun 2019 09:37 )   PT: 17.5 sec;   INR: 1.56 ratio        Culture - Urine (06.24.19 @ 16:45)    Specimen Source: .Urine Clean Catch (Midstream)    Culture Results:   >=3 organisms. Probable collection contamination.    Culture - Blood (06.24.19 @ 15:15)    Specimen Source: .Blood Blood-Peripheral    Culture Results:   No growth to date.

## 2019-06-28 NOTE — PROGRESS NOTE ADULT - ASSESSMENT
88 yrs M  PMHx of A-fib on Coumadin s/p pacemaker, HTN, HLD , CKD  ,Recent C diff Infection presenting to the ED s/p fall last night ,fever and  Persistent diarrhoea  .     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  -In the ED received Vancomycin and cefepime   -In the ED 3.2 liter bolus  -continue PO Vancomycin and flagyl  -ID recommendations appreciated  -Blood cultures x 2 - NGTD    # Atrial fibrillation   - continue amiodarone, Metoprolol (with hold parameters)  - INR 1.51 today  - Lovenox 30mg daily started until INR>2  - Warfarin 3mg tonight (home dose 1 mg daily)    # FRANCINE on CKD 3  - stable  -No NSAIDs  -Baseline 1.6-1.8    #Hypothyroidism   -continue synthroid    #Gout  -continue Allopurinol     #Weakness likely secondary to Deconditioning and or Sepsis and or Diarrhea  -Physical therapy recommendations appreciated - home eith home PT    #HTN  -BP well controlled  - DASH diet  -continue Metoprolol     #HLD  -continue  statin    #Dispo: continued hospitalization 88 yrs M  PMHx of A-fib on Coumadin s/p pacemaker, HTN, HLD , CKD  ,Recent C diff Infection presenting to the ED s/p fall last night ,fever and  Persistent diarrhoea  .     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  -In the ED received Vancomycin and cefepime   -In the ED 3.2 liter bolus  -continue PO Vancomycin and flagyl  -ID recommendations appreciated  -Blood cultures x 2 - NGTD    # Atrial fibrillation   - continue amiodarone, Metoprolol (with hold parameters)  - INR 1.56 today  - Lovenox 30mg daily started until INR>2  - Warfarin 3mg tonight (home dose 1 mg daily)    # FRANCINE on CKD 3  - stable  -No NSAIDs  -Baseline 1.6-1.8    #Hypothyroidism   -continue synthroid    #Gout  -continue Allopurinol     #Weakness likely secondary to Deconditioning and or Sepsis and or Diarrhea  -Physical therapy recommendations appreciated - home eith home PT    #HTN  -BP well controlled  - DASH diet  -continue Metoprolol     #HLD  -continue  statin    #Dispo: continued hospitalization

## 2019-06-28 NOTE — PROGRESS NOTE ADULT - SUBJECTIVE AND OBJECTIVE BOX
Date of service: 19 @ 10:37    pt seen and examined  feeling better still have loose bms but more formed  no abd pain  afebrile    ROS: no fever or chills; denies dizziness, no HA, no SOB or cough, no  constipation; no dysuria, no urinary frequency, no legs pain, no rashes    MEDICATIONS  (STANDING):  allopurinol 300 milliGRAM(s) Oral daily  amiodarone    Tablet 200 milliGRAM(s) Oral daily  atorvastatin 20 milliGRAM(s) Oral at bedtime  enoxaparin Injectable 30 milliGRAM(s) SubCutaneous daily  folic acid 1 milliGRAM(s) Oral daily  lactobacillus acidophilus 1 Tablet(s) Oral two times a day  levothyroxine 125 MICROGram(s) Oral daily  metoprolol tartrate 25 milliGRAM(s) Oral two times a day  metroNIDAZOLE  IVPB      metroNIDAZOLE  IVPB 500 milliGRAM(s) IV Intermittent every 8 hours  multivitamin 1 Tablet(s) Oral daily  vancomycin    Solution 125 milliGRAM(s) Oral every 6 hours      Vital Signs Last 24 Hrs  T(C): 36.7 (2019 05:45), Max: 36.7 (2019 17:32)  T(F): 98 (2019 05:45), Max: 98.1 (2019 17:32)  HR: 59 (2019 05:45) (59 - 61)  BP: 110/51 (2019 05:45) (104/45 - 129/58)  BP(mean): --  RR: 19 (2019 05:45) (16 - 19)  SpO2: 99% (2019 05:45) (98% - 100%)    PE:  Constitutional: frail looking  HEENT: NC/AT, EOMI, PERRLA, conjunctivae clear; ears and nose atraumatic; pharynx benign  Neck: supple; thyroid not palpable  Back: no tenderness  Respiratory: respiratory effort normal; clear to auscultation  Cardiovascular: S1S2 regular, no murmurs  Abdomen: soft, not tender, not distended, positive BS; liver and spleen WNL  Genitourinary: no suprapubic tenderness  Lymphatic: no LN palpable  Musculoskeletal: no muscle tenderness, no joint swelling or tenderness  Extremities: no pedal edema  Neurological/ Psychiatric:  moving all extremities  Skin: no rashes; no palpable lesions    Labs: all available labs reviewed                           10.0   6.66  )-----------( 197      ( 2019 09:37 )             32.7     06-    146<H>  |  113<H>  |  36<H>  ----------------------------<  100<H>  3.9   |  22  |  1.39<H>    Ca    8.6      2019 09:37             Cultures:           Urinalysis Basic - ( 2019 16:45 )    Color: Yellow / Appearance: Clear / S.010 / pH: x  Gluc: x / Ketone: Negative  / Bili: Negative / Urobili: Negative mg/dL   Blood: x / Protein: Negative mg/dL / Nitrite: Negative   Leuk Esterase: Small / RBC: Negative /HPF / WBC 6-10   Sq Epi: x / Non Sq Epi: Few / Bacteria: Occasional    Culture - Urine (19 @ 16:45)    Specimen Source: .Urine Clean Catch (Midstream)    Culture Results:   >=3 organisms. Probable collection contamination.    Culture - Blood (19 @ 15:15)    Specimen Source: .Blood Blood-Peripheral    Culture Results:   No growth to date.      Radiology: all available radiological tests reviewed    EXAM:  CT ABDOMEN AND PELVIS OC                            PROCEDURE DATE:  2019          INTERPRETATION:  CLINICAL STATEMENT: diarrhea, recently treated for C.   difficile    TECHNIQUE: CT of the abdomen and pelvis was performed without IV   contrast. Oral contrast was administered.    COMPARISON: 2015    FINDINGS:    The lower chest is remarkable for pacemaker wires. There are minimal   bilateral pleural effusions    The liver is unremarkable. The gallbladder is not identified and may be   surgically absent. Dropped gallstones are suspected in the right abdomen.    The spleen, pancreas and adrenal glands are unremarkable.The kidneys   demonstrate small cysts. Additionally there is an indeterminate exophytic   lesion arising fromthe lateral lower pole of the right kidney which was   present on the prior study and appears similar or smaller consistent with   a benign etiology. The fluid-filled bladder appears intact.    There is no bowel obstruction. There is no CT evidence of appendicitis.   There is severe wall thickening of the colon diffusely which is   compatible with pseudomembranous colitis    There is no intraperitoneal free air.  There is no free fluid. The aorta   is not aneurysmal. There is atherosclerotic calcification of the   abdominal aorta and its branches.    There is no significant abdominal, retroperitoneal or pelvic   lymphadenopathy. The pelvic structures are unremarkable.    The osseous structures demonstrate osteopenia and degenerative changes.   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 There is heterotopic   ossification about the left hip..    IMPRESSION:    Severe diffuse colonic wall thickening may be secondary to C. difficile.   Recommend clinical and laboratory correlation.  Incidentally seen are bilateral renal cysts  Gallbladder has been removed since the prior study. Dropped gallstones   are suspected in the right abdomen      EXAM:  XR CHEST PORTABLE IMMED 1V                            PROCEDURE DATE:  2019            INTERPRETATION:  Portable chest radiograph dated 2019.    COMPARISON: 2017.    CLINICAL INFORMATION: Sepsis.  FINDINGS:    The airway is midline.  There are no airspace consolidations. Chronic left upper lobe scarring is   again noted.  There is no pleural effusion or pneumothorax.   The cardiac silhouette is normal size. Continued application of the left   subclavian vein pacemaker, leads are in the right heart.  The bones are normal.     IMPRESSION:    No acute cardiopulmonary findings.      Advanced directives addressed: full resuscitation

## 2019-06-28 NOTE — PROGRESS NOTE ADULT - SUBJECTIVE AND OBJECTIVE BOX
Pt has been seen and examined with FP resident, resident supervised agree with a/p       Patient is a 88y old  Male who presents with a chief complaint of Fever ,fall ,diarrhoea (27 Jun 2019 11:09)        PHYSICAL EXAM:    general- comfortable   -[id number]-[id number],cta  -cvs-s1s2 normal   -p/a-soft,bs+  -extremity- no asymmetrical swelling noted   -cns- non focal       A/P    #ct vanco and flagyl     #improving     #discharge plan

## 2019-06-29 DIAGNOSIS — A49.8 OTHER BACTERIAL INFECTIONS OF UNSPECIFIED SITE: ICD-10-CM

## 2019-06-29 DIAGNOSIS — R19.7 DIARRHEA, UNSPECIFIED: ICD-10-CM

## 2019-06-29 DIAGNOSIS — A41.9 SEPSIS, UNSPECIFIED ORGANISM: ICD-10-CM

## 2019-06-29 LAB
ANION GAP SERPL CALC-SCNC: 8 MMOL/L — SIGNIFICANT CHANGE UP (ref 5–17)
BUN SERPL-MCNC: 33 MG/DL — HIGH (ref 7–23)
CALCIUM SERPL-MCNC: 8.6 MG/DL — SIGNIFICANT CHANGE UP (ref 8.5–10.1)
CHLORIDE SERPL-SCNC: 114 MMOL/L — HIGH (ref 96–108)
CO2 SERPL-SCNC: 23 MMOL/L — SIGNIFICANT CHANGE UP (ref 22–31)
CREAT SERPL-MCNC: 1.24 MG/DL — SIGNIFICANT CHANGE UP (ref 0.5–1.3)
CULTURE RESULTS: SIGNIFICANT CHANGE UP
CULTURE RESULTS: SIGNIFICANT CHANGE UP
GLUCOSE SERPL-MCNC: 110 MG/DL — HIGH (ref 70–99)
INR BLD: 1.76 RATIO — HIGH (ref 0.88–1.16)
POTASSIUM SERPL-MCNC: 4 MMOL/L — SIGNIFICANT CHANGE UP (ref 3.5–5.3)
POTASSIUM SERPL-SCNC: 4 MMOL/L — SIGNIFICANT CHANGE UP (ref 3.5–5.3)
PROTHROM AB SERPL-ACNC: 19.9 SEC — HIGH (ref 10–12.9)
SODIUM SERPL-SCNC: 145 MMOL/L — SIGNIFICANT CHANGE UP (ref 135–145)
SPECIMEN SOURCE: SIGNIFICANT CHANGE UP
SPECIMEN SOURCE: SIGNIFICANT CHANGE UP

## 2019-06-29 RX ORDER — WARFARIN SODIUM 2.5 MG/1
5 TABLET ORAL DAILY
Refills: 0 | Status: DISCONTINUED | OUTPATIENT
Start: 2019-06-29 | End: 2019-06-30

## 2019-06-29 RX ORDER — SOD,AMMONIUM,POTASSIUM LACTATE
1 CREAM (GRAM) TOPICAL
Refills: 0 | Status: DISCONTINUED | OUTPATIENT
Start: 2019-06-29 | End: 2019-07-02

## 2019-06-29 RX ADMIN — Medication 125 MILLIGRAM(S): at 11:13

## 2019-06-29 RX ADMIN — Medication 25 MILLIGRAM(S): at 06:03

## 2019-06-29 RX ADMIN — Medication 100 MILLIGRAM(S): at 06:02

## 2019-06-29 RX ADMIN — WARFARIN SODIUM 5 MILLIGRAM(S): 2.5 TABLET ORAL at 23:23

## 2019-06-29 RX ADMIN — Medication 300 MILLIGRAM(S): at 11:13

## 2019-06-29 RX ADMIN — Medication 125 MICROGRAM(S): at 06:03

## 2019-06-29 RX ADMIN — Medication 100 MILLIGRAM(S): at 23:20

## 2019-06-29 RX ADMIN — Medication 1 TABLET(S): at 06:03

## 2019-06-29 RX ADMIN — Medication 125 MILLIGRAM(S): at 23:22

## 2019-06-29 RX ADMIN — Medication 125 MILLIGRAM(S): at 17:13

## 2019-06-29 RX ADMIN — Medication 1 TABLET(S): at 17:13

## 2019-06-29 RX ADMIN — Medication 1 TABLET(S): at 11:13

## 2019-06-29 RX ADMIN — AMIODARONE HYDROCHLORIDE 200 MILLIGRAM(S): 400 TABLET ORAL at 06:03

## 2019-06-29 RX ADMIN — Medication 25 MILLIGRAM(S): at 17:13

## 2019-06-29 RX ADMIN — Medication 125 MILLIGRAM(S): at 06:03

## 2019-06-29 RX ADMIN — Medication 1 APPLICATION(S): at 17:13

## 2019-06-29 RX ADMIN — Medication 1 MILLIGRAM(S): at 11:13

## 2019-06-29 RX ADMIN — Medication 100 MILLIGRAM(S): at 13:53

## 2019-06-29 RX ADMIN — ATORVASTATIN CALCIUM 20 MILLIGRAM(S): 80 TABLET, FILM COATED ORAL at 23:22

## 2019-06-29 RX ADMIN — ENOXAPARIN SODIUM 30 MILLIGRAM(S): 100 INJECTION SUBCUTANEOUS at 11:13

## 2019-06-29 NOTE — PROGRESS NOTE ADULT - SUBJECTIVE AND OBJECTIVE BOX
SUBJECTIVE: 88 yrs M  PMHx of A-fib on Coumadin s/p pacemaker, HTN, HLD , CKD  ,Recent C diff Infection presenting to the ED s/p fall last night ,fever and  Persistent diarrhoea  .   Patient reports that he using the bathroom and when he got up to walk away, his legs felt weak and he slipped and fell hit his Left knee . Family helped pt off the ground and pt was able to ambulate with assistance. As per the patient he have been having diarrhoea for the past 3 days , about 3 episodes /day .Describes as he used a "stool softener "   He was admitted at [facility] for the Pneumonia and as per the chart note ?UTI in the month of May 2019 and discharged to the Rehab .While he was in the Rehab he had diagnosed with the C diff and was treated with the Vancomycin and in addition he had Cellulitis of face and leg .Today he had a fever of 102 F and was making no sense and family both him to the hospital .   Denies head trauma , Loss of consciousness , cough, sorethroat  ,URI symptoms , abd pain, blood in stools ,headache ,change  in the urination including dysuria .  Baseline he uses a walker ,has limited mobility for the past few years .    In ED patient had fever of 101.7 and received 3.2 liter fluid and vancomycin and Cefepime. Patient tested positve for C. diff toxin and was started on oral vancomycin and Flagyl this hospitalization.     6/29: Pt was seen and examined at bedside. No acute events overnight. Pt describes having 3-4 bowel movements in the last 24 hrs. He denies any fevers, chills, nausea, vomiting, or abdominal pain. Pt notes that his skin tends to itch all over his body. Discharge planning to [facility] discussed at bedside.     REVIEW OF SYSTEMS:  CONSTITUTIONAL: + weakness (improving), no fevers or chills  EYES/ENT: No visual changes;  No vertigo or throat pain   NECK: No pain or stiffness  RESPIRATORY: No cough, wheezing, hemoptysis; No shortness of breath  CARDIOVASCULAR: No chest pain or palpitations  GASTROINTESTINAL: No abdominal or epigastric pain. No nausea, vomiting, or hematemesis; + diarrhea. (improving). No melena or hematochezia.  GENITOURINARY: No dysuria, frequency or hematurias  SKIN: No itching, burning, rashes, or lesions   All other review of systems is negative unless indicated above    Vital Signs Last 24 Hrs  T(C): 36.3 (29 Jun 2019 11:22), Max: 36.5 (28 Jun 2019 17:40)  T(F): 97.4 (29 Jun 2019 11:22), Max: 97.7 (28 Jun 2019 17:40)  HR: 60 (29 Jun 2019 11:22) (60 - 64)  BP: 121/49 (29 Jun 2019 11:22) (110/50 - 128/61)  RR: 16 (29 Jun 2019 11:22) (16 - 18)  SpO2: 100% (29 Jun 2019 11:22) (99% - 100%)      I&O's Summary    28 Jun 2019 07:01  -  29 Jun 2019 07:00  --------------------------------------------------------  IN: 100 mL / OUT: 0 mL / NET: 100 mL      Constitutional: NAD, awake and alert, elderly gentleman, mildly disheveled, sitting upright in bed  HEENT: PERR, EOMI, Normal Hearing, MMM  Neck: Soft and supple, No LAD, No JVD  Respiratory: Breath sounds are clear bilaterally, No wheezing, rales or rhonchi  Cardiovascular: S1 and S2, regular rate and rhythm, no Murmurs, gallops or rubs  Gastrointestinal: Bowel Sounds present, soft, nontender, nondistended, no guarding, no rebound  Extremities: mild peripheral edema b/l, dried skin noted b/l, chronic venous stasis in b/l lower extremities  Vascular: 2+ peripheral pulses  Neurological: A/O x 3, no focal deficits  Musculoskeletal: 4/5 strength b/l upper and lower extremities  Skin: No rashes    MEDICATIONS  (STANDING):  allopurinol 300 milliGRAM(s) Oral daily  amiodarone    Tablet 200 milliGRAM(s) Oral daily  ammonium lactate 12% Lotion 1 Application(s) Topical two times a day  atorvastatin 20 milliGRAM(s) Oral at bedtime  folic acid 1 milliGRAM(s) Oral daily  lactobacillus acidophilus 1 Tablet(s) Oral two times a day  levothyroxine 125 MICROGram(s) Oral daily  metoprolol tartrate 25 milliGRAM(s) Oral two times a day  metroNIDAZOLE  IVPB      metroNIDAZOLE  IVPB 500 milliGRAM(s) IV Intermittent every 8 hours  multivitamin 1 Tablet(s) Oral daily  vancomycin    Solution 125 milliGRAM(s) Oral every 6 hours  warfarin 5 milliGRAM(s) Oral daily        LABS: All Labs Reviewed:                                         10.0   6.66  )-----------( 197      ( 28 Jun 2019 09:37 )             32.7     29 Jun 2019 07:55    145    |  114    |  33     ----------------------------<  110    4.0     |  23     |  1.24     Ca    8.6        29 Jun 2019 07:55      PT/INR - ( 29 Jun 2019 07:55 )   PT: 19.9 sec;   INR: 1.76 ratio         Culture - Urine (06.24.19 @ 16:45)    Specimen Source: .Urine Clean Catch (Midstream)    Culture Results:   >=3 organisms. Probable collection contamination.    Culture - Blood (06.24.19 @ 15:15)    Specimen Source: .Blood Blood-Peripheral    Culture Results:   No growth to date.

## 2019-06-29 NOTE — PROGRESS NOTE ADULT - SUBJECTIVE AND OBJECTIVE BOX
Pt has been seen and examined with FP resident, resident supervised agree with a/p       Patient is a 88y old  Male who presents with a chief complaint of Fever ,fall ,diarrhoea (28 Jun 2019 17:15)        PHYSICAL EXAM:  Vital Signs Last 24 Hrs  T(C): 36.3 (29 Jun 2019 11:22), Max: 36.5 (28 Jun 2019 17:40)  T(F): 97.4 (29 Jun 2019 11:22), Max: 97.7 (28 Jun 2019 17:40)  HR: 60 (29 Jun 2019 11:22) (60 - 64)  BP: 121/49 (29 Jun 2019 11:22) (110/50 - 128/61)  BP(mean): --  RR: 16 (29 Jun 2019 11:22) (16 - 18)  SpO2: 100% (29 Jun 2019 11:22) (99% - 100%)  general- comfortable   -rs-aeeb,cta  -cvs-s1s2 normal   -p/a-soft,bs+  -extremity- chronic skin changes b/l leg   -cns- non focal         A/P    #ct abx for CDAD

## 2019-06-29 NOTE — PROGRESS NOTE ADULT - ASSESSMENT
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     #Sepsis secondary to  recurrent CDiff infection  -In the ED received Vancomycin and cefepime   -In the ED 3.2 liter bolus  - Sepsis present on admission, now resolved  - C. Diff Toxin positive in stool (6/24/19)  - History of C Diff infection in the past  - continue PO Vancomycin and Flagyl  - ID recommendations appreciated  - Blood cultures x 2 - NGTD    # Atrial fibrillation   - continue amiodarone, Metoprolol (with hold parameters)  - INR 1.76 today  - Lovenox 30mg daily started until INR>2  - Warfarin 3mg tonight (home dose 1 mg daily)    # FRANCINE on CKD 3  - stable  -No NSAIDs  -Baseline 1.6-1.8    #Hypothyroidism   -continue synthroid    #Gout  -continue Allopurinol     #Weakness likely secondary to Deconditioning and or Sepsis and or Diarrhea  -Physical therapy recommendations appreciated - home with home PT    #HTN  - BP well controlled  - DASH diet  -continue Metoprolol     #HLD  -continue  statin    #Dispo: continued hospitalization. Discharge planning for Monday to [facility]

## 2019-06-30 LAB
ANION GAP SERPL CALC-SCNC: 9 MMOL/L — SIGNIFICANT CHANGE UP (ref 5–17)
BUN SERPL-MCNC: 26 MG/DL — HIGH (ref 7–23)
CALCIUM SERPL-MCNC: 8.4 MG/DL — LOW (ref 8.5–10.1)
CHLORIDE SERPL-SCNC: 113 MMOL/L — HIGH (ref 96–108)
CO2 SERPL-SCNC: 23 MMOL/L — SIGNIFICANT CHANGE UP (ref 22–31)
CREAT SERPL-MCNC: 1.19 MG/DL — SIGNIFICANT CHANGE UP (ref 0.5–1.3)
GLUCOSE SERPL-MCNC: 98 MG/DL — SIGNIFICANT CHANGE UP (ref 70–99)
INR BLD: 2.1 RATIO — HIGH (ref 0.88–1.16)
POTASSIUM SERPL-MCNC: 4.2 MMOL/L — SIGNIFICANT CHANGE UP (ref 3.5–5.3)
POTASSIUM SERPL-SCNC: 4.2 MMOL/L — SIGNIFICANT CHANGE UP (ref 3.5–5.3)
PROTHROM AB SERPL-ACNC: 23.8 SEC — HIGH (ref 10–12.9)
SODIUM SERPL-SCNC: 145 MMOL/L — SIGNIFICANT CHANGE UP (ref 135–145)

## 2019-06-30 RX ORDER — WARFARIN SODIUM 2.5 MG/1
3 TABLET ORAL DAILY
Refills: 0 | Status: DISCONTINUED | OUTPATIENT
Start: 2019-06-30 | End: 2019-07-02

## 2019-06-30 RX ADMIN — WARFARIN SODIUM 3 MILLIGRAM(S): 2.5 TABLET ORAL at 23:17

## 2019-06-30 RX ADMIN — Medication 125 MILLIGRAM(S): at 17:20

## 2019-06-30 RX ADMIN — Medication 1 TABLET(S): at 17:20

## 2019-06-30 RX ADMIN — Medication 1 APPLICATION(S): at 06:09

## 2019-06-30 RX ADMIN — ATORVASTATIN CALCIUM 20 MILLIGRAM(S): 80 TABLET, FILM COATED ORAL at 23:17

## 2019-06-30 RX ADMIN — Medication 1 APPLICATION(S): at 17:20

## 2019-06-30 RX ADMIN — Medication 125 MICROGRAM(S): at 06:09

## 2019-06-30 RX ADMIN — Medication 1 TABLET(S): at 06:10

## 2019-06-30 RX ADMIN — Medication 25 MILLIGRAM(S): at 06:10

## 2019-06-30 RX ADMIN — Medication 25 MILLIGRAM(S): at 17:20

## 2019-06-30 RX ADMIN — Medication 125 MILLIGRAM(S): at 11:08

## 2019-06-30 RX ADMIN — Medication 125 MILLIGRAM(S): at 23:18

## 2019-06-30 RX ADMIN — Medication 300 MILLIGRAM(S): at 11:08

## 2019-06-30 RX ADMIN — Medication 1 TABLET(S): at 11:08

## 2019-06-30 RX ADMIN — AMIODARONE HYDROCHLORIDE 200 MILLIGRAM(S): 400 TABLET ORAL at 06:09

## 2019-06-30 RX ADMIN — Medication 125 MILLIGRAM(S): at 06:10

## 2019-06-30 RX ADMIN — Medication 1 MILLIGRAM(S): at 11:08

## 2019-06-30 NOTE — PROGRESS NOTE ADULT - ASSESSMENT
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     #Sepsis secondary to  recurrent CDiff infection  -In the ED received Vancomycin and cefepime, 3.2 NS liter bolus  - Sepsis present on admission, now resolved  - C. Diff Toxin positive in stool (6/24/19)  - continue PO Vancomycin   - d/c Flagyl today as patient refusing placement of new IV line at this time  - patient to be discharged home on PO vancomycin taper  - ID recommendations appreciated  - Blood cultures x 2 - NGTD    # Atrial fibrillation   - continue amiodarone, Metoprolol (with hold parameters)  - INR 1.76 -> 2.1 today received Warfarin 5mg yesterday)  - d/c Lovenox 30mg daily as INR now therapuetic  - Warfarin 3mg tonight (home dose 1 mg daily)    # FRANCINE on CKD 3  - stable  -No NSAIDs  -Baseline 1.6-1.8    #Hypothyroidism   -continue synthroid    #Gout  -continue Allopurinol     #Weakness likely secondary to Deconditioning and or Sepsis and or Diarrhea  -Physical therapy recommendations appreciated    #HTN  - BP well controlled  - DASH diet  -continue Metoprolol (with hold parameters)    #HLD  -continue  statin    #Dispo: continued hospitalization. Discharge planning for Monday? 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     #Sepsis secondary to  recurrent CDiff infection  -In the ED received Vancomycin and cefepime, 3.2 NS liter bolus  - Sepsis present on admission, now resolved  - C. Diff Toxin positive in stool (6/24/19)  - continue PO Vancomycin (day #6)  - d/c Flagyl today as patient refusing placement of new IV line at this time  - patient to be discharged home on PO vancomycin taper  - ID recommendations appreciated  - Blood cultures x 2 - NGTD    # Atrial fibrillation   - continue amiodarone, Metoprolol (with hold parameters)  - INR 1.76 -> 2.1 today received Warfarin 5mg yesterday)  - d/c Lovenox 30mg daily as INR now therapuetic  - Warfarin 3mg tonight (home dose 1 mg daily)    # FRANCINE on CKD 3  - stable  -No NSAIDs  -Baseline 1.6-1.8    #Hypothyroidism   -continue synthroid    #Gout  -continue Allopurinol     #Weakness likely secondary to Deconditioning and or Sepsis and or Diarrhea  -Physical therapy recommendations appreciated    #HTN  - BP well controlled  - DASH diet  -continue Metoprolol (with hold parameters)    #HLD  -continue  statin    #Dispo: continued hospitalization. Discharge planning for Monday? to [facility]

## 2019-06-30 NOTE — PROGRESS NOTE ADULT - SUBJECTIVE AND OBJECTIVE BOX
SUBJECTIVE: Mr. Garcia is an 88 yrs M with PMHx of A-fib on Coumadin s/p pacemaker, HTN, HLD , CKD  ,Recent C diff Infection presenting to the ED on June 24th, 2019, s/p fall and complaining of profuse diarrhea. Patient notes that he felt weak when ambulating to bathroom  and hit his left knee. Patient has been having diarrhea 3 times daily for 3 days prior to admission. Denies any aggravating or alleviating factors for diarrhea. He was also found to have fever 102F at home prior to presentation and was altered (speaking sentences that did not make sense).   Patient recently had hospital stay for PNA at [facility] in May 2019 before being discharged to rehab. At  rehab patient was diagnosed at treated for C. diff with PO vancomycin.  Baseline he uses a walker ,has limited mobility for the past few years .    6/30/19: This morning, patient states that he had 3 BM overnight, but they are becoming more formed. BM's are no longer watery, but is as if he is taking stool softners. Patient denies any abdominal pain, nausea or vomiting this AM. He feels like he is getting stronger daily.     REVIEW OF SYSTEMS:  CONSTITUTIONAL: + weakness; no fevers or chills  EYES/ENT: No visual changes;  No vertigo or throat pain   NECK: No pain or stiffness  RESPIRATORY: No cough, wheezing, hemoptysis; No shortness of breath  CARDIOVASCULAR: No chest pain or palpitations  GASTROINTESTINAL: No abdominal or epigastric pain. No nausea, vomiting, or hematemesis; + loose stools, No melena or hematochezia.  GENITOURINARY: No dysuria, frequency or hematuria  SKIN: No itching, burning, rashes, or lesions   All other review of systems is negative unless indicated above    Vital Signs Last 24 Hrs  T(C): 36.2 (30 Jun 2019 11:56), Max: 36.4 (29 Jun 2019 17:18)  T(F): 97.2 (30 Jun 2019 11:56), Max: 97.5 (29 Jun 2019 17:18)  HR: 61 (30 Jun 2019 11:56) (61 - 67)  BP: 124/56 (30 Jun 2019 11:56) (124/56 - 147/58)  RR: 18 (30 Jun 2019 11:56) (17 - 18)  SpO2: 100% (30 Jun 2019 11:56) (98% - 100%)    PHYSICAL EXAM:  Constitutional: NAD, awake and alert, elderly gentleman, mildly disheveled, sitting upright in bed  HEENT: PERR, EOMI, Normal Hearing, MMM  Neck: Soft and supple, No LAD, No JVD  Respiratory: Breath sounds are clear bilaterally, No wheezing, rales or rhonchi  Cardiovascular: S1 and S2, regular rate and rhythm, no Murmurs, gallops or rubs  Gastrointestinal: Bowel Sounds present, soft, nontender, nondistended, no guarding, no rebound  Extremities: mild peripheral edema b/l, dried skin noted b/l, chronic venous stasis in b/l lower extremities  Vascular: 2+ peripheral pulses  Neurological: A/O x 3, no focal deficits  Musculoskeletal: 4/5 strength b/l upper and lower extremities  Skin: No rashes    MEDICATIONS:  MEDICATIONS  (STANDING):  allopurinol 300 milliGRAM(s) Oral daily  amiodarone    Tablet 200 milliGRAM(s) Oral daily  ammonium lactate 12% Lotion 1 Application(s) Topical two times a day  atorvastatin 20 milliGRAM(s) Oral at bedtime  folic acid 1 milliGRAM(s) Oral daily  lactobacillus acidophilus 1 Tablet(s) Oral two times a day  levothyroxine 125 MICROGram(s) Oral daily  metoprolol tartrate 25 milliGRAM(s) Oral two times a day  multivitamin 1 Tablet(s) Oral daily  vancomycin    Solution 125 milliGRAM(s) Oral every 6 hours  warfarin 5 milliGRAM(s) Oral daily      LABS: All Labs Reviewed:    06-30    145  |  113<H>  |  26<H>  ----------------------------<  98  4.2   |  23  |  1.19    Ca    8.4<L>      30 Jun 2019 06:14      PT/INR - ( 30 Jun 2019 06:14 )   PT: 23.8 sec;   INR: 2.10 ratio

## 2019-07-01 ENCOUNTER — TRANSCRIPTION ENCOUNTER (OUTPATIENT)
Age: 84
End: 2019-07-01

## 2019-07-01 LAB
INR BLD: 2.98 RATIO — HIGH (ref 0.88–1.16)
PROTHROM AB SERPL-ACNC: 34.2 SEC — HIGH (ref 10–12.9)

## 2019-07-01 RX ADMIN — Medication 125 MICROGRAM(S): at 06:35

## 2019-07-01 RX ADMIN — Medication 125 MILLIGRAM(S): at 17:24

## 2019-07-01 RX ADMIN — Medication 300 MILLIGRAM(S): at 12:38

## 2019-07-01 RX ADMIN — Medication 25 MILLIGRAM(S): at 17:23

## 2019-07-01 RX ADMIN — Medication 1 MILLIGRAM(S): at 12:38

## 2019-07-01 RX ADMIN — WARFARIN SODIUM 3 MILLIGRAM(S): 2.5 TABLET ORAL at 22:27

## 2019-07-01 RX ADMIN — ATORVASTATIN CALCIUM 20 MILLIGRAM(S): 80 TABLET, FILM COATED ORAL at 22:26

## 2019-07-01 RX ADMIN — AMIODARONE HYDROCHLORIDE 200 MILLIGRAM(S): 400 TABLET ORAL at 06:35

## 2019-07-01 RX ADMIN — Medication 1 APPLICATION(S): at 06:38

## 2019-07-01 RX ADMIN — Medication 1 APPLICATION(S): at 17:24

## 2019-07-01 RX ADMIN — Medication 1 TABLET(S): at 12:38

## 2019-07-01 RX ADMIN — Medication 25 MILLIGRAM(S): at 06:35

## 2019-07-01 RX ADMIN — Medication 1 TABLET(S): at 06:35

## 2019-07-01 RX ADMIN — Medication 1 TABLET(S): at 17:23

## 2019-07-01 RX ADMIN — Medication 125 MILLIGRAM(S): at 12:38

## 2019-07-01 RX ADMIN — Medication 125 MILLIGRAM(S): at 06:35

## 2019-07-01 NOTE — DISCHARGE NOTE PROVIDER - CARE PROVIDER_API CALL
Ceci Nunn (DO)  Family Medicine  [address], [address]  [place] [address]  Phone: [phone]  Fax: [phone]  Follow Up Time:     Chin Mansfield (MD)  Cardiovascular Disease; Internal Medicine  [address] [place] [address]  Phone: [phone]  Fax: [phone]  Follow Up Time:

## 2019-07-01 NOTE — PROGRESS NOTE ADULT - ASSESSMENT
88 yrs M  PMHx of A-fib on Coumadin s/p pacemaker, HTN, HLD , CKD  ,Recent C diff Infection presenting to the ED s/p fall last night ,fever and  Persistent diarrhea  . Patient reports that he using the bathroom and when he got up to walk away, his legs felt weak and he slipped and fell hit his Left knee. As per the patient he have been having diarrhoea for the past 3 days, about 3 episodes /day. He was admitted at [facility] for the Pneumonia and as per the chart note ?UTI in the month of May 2019 and discharged to the Rehab .While he was in the Rehab he had diagnosed with the C diff and was treated with the Vancomycin and in addition he had Cellulitis of face and leg. Here febrile to 102, wbc ct 21, was given oral vancomycin/IV flagyl/fluids and vanco/cefepime.      1. diarrhea. fever. leukocytosis. recurrent CDAD  - slowly improving, diarrhea resolving, wbc ct normalized  - CT abd/pelvis reviewed  - on oral vancomycin [id number] #7  - s/p IV flagyl [id number] #5  - blood cx no growth, urine cx contaminated  - continue with above cdad tx  - plan for PO vancomycin 6 week long taper on dc  - probiotics BID  - monitor temps  - supportive care  - fu cbc    2. other issues -care per medicine

## 2019-07-01 NOTE — DISCHARGE NOTE PROVIDER - NSDCCPCAREPLAN_GEN_ALL_CORE_FT
PRINCIPAL DISCHARGE DIAGNOSIS  Diagnosis: Recurrent Clostridium difficile diarrhea  Assessment and Plan of Treatment: Your diarrhea was due to recurrent c.diff infection. You recieved oral vancomycin and IV Flagyl while hospitalized. Please continue taking oral vancomycin for an additional ________________________ to complete treatment.      SECONDARY DISCHARGE DIAGNOSES  Diagnosis: Afib  Assessment and Plan of Treatment: YOur heart rate was contolled while you were in the hospital. However your INR was lower than your goal of 2 on admission. You were given increased doses of Warfarin to get your INR to a therapuetic level.   After discharge, please take Warfarin ______________________________ and have your INR checked in  _____________________- PRINCIPAL DISCHARGE DIAGNOSIS  Diagnosis: Recurrent Clostridium difficile diarrhea  Assessment and Plan of Treatment: Your diarrhea was due to recurrent c.diff infection. You recieved oral vancomycin and IV Flagyl while hospitalized. Please continue taking oral vancomycin for an additional six weeks to complete taper as directed in medical reconcilliation.      SECONDARY DISCHARGE DIAGNOSES  Diagnosis: Afib  Assessment and Plan of Treatment: Your heart rate was contolled while you were in the hospital. However your INR was lower than your goal of 2 on admission. You were given increased doses of Warfarin to get your INR to a therapuetic level, discharged supratheraputic. Hold warfrin night of discharge and check INR in a.m. and resume home dose when within theraputic range (2-3).

## 2019-07-01 NOTE — PROGRESS NOTE ADULT - SUBJECTIVE AND OBJECTIVE BOX
Date of service: 19 @ 11:41    pt seen and examined  feeling better having more formed bms  no abd pain  afebrile    ROS: no fever or chills; denies dizziness, no HA, no SOB or cough, no  constipation; no dysuria, no urinary frequency, no legs pain, no rashes    MEDICATIONS  (STANDING):  allopurinol 300 milliGRAM(s) Oral daily  amiodarone    Tablet 200 milliGRAM(s) Oral daily  ammonium lactate 12% Lotion 1 Application(s) Topical two times a day  atorvastatin 20 milliGRAM(s) Oral at bedtime  folic acid 1 milliGRAM(s) Oral daily  lactobacillus acidophilus 1 Tablet(s) Oral two times a day  levothyroxine 125 MICROGram(s) Oral daily  metoprolol tartrate 25 milliGRAM(s) Oral two times a day  multivitamin 1 Tablet(s) Oral daily  vancomycin    Solution 125 milliGRAM(s) Oral every 6 hours  warfarin 3 milliGRAM(s) Oral daily    Vital Signs Last 24 Hrs  T(C): 36.5 (2019 11:29), Max: 37.2 (2019 05:45)  T(F): 97.7 (2019 11:29), Max: 99 (2019 05:45)  HR: 60 (2019 11:29) (60 - 63)  BP: 124/48 (2019 11:29) (124/48 - 142/60)  BP(mean): --  RR: 17 (2019 11:29) (17 - 18)  SpO2: 98% (2019 11:29) (98% - 100%)      PE:  Constitutional: frail looking  HEENT: NC/AT, EOMI, PERRLA, conjunctivae clear; ears and nose atraumatic; pharynx benign  Neck: supple; thyroid not palpable  Back: no tenderness  Respiratory: respiratory effort normal; clear to auscultation  Cardiovascular: S1S2 regular, no murmurs  Abdomen: soft, not tender, not distended, positive BS; liver and spleen WNL  Genitourinary: no suprapubic tenderness  Lymphatic: no LN palpable  Musculoskeletal: no muscle tenderness, no joint swelling or tenderness  Extremities: no pedal edema  Neurological/ Psychiatric:  moving all extremities  Skin: no rashes; no palpable lesions    Labs: all available labs reviewed                  06-30    145  |  113<H>  |  26<H>  ----------------------------<  98  4.2   |  23  |  1.19    Ca    8.4<L>      2019 06:14        Urinalysis Basic - ( 2019 16:45 )    Color: Yellow / Appearance: Clear / S.010 / pH: x  Gluc: x / Ketone: Negative  / Bili: Negative / Urobili: Negative mg/dL   Blood: x / Protein: Negative mg/dL / Nitrite: Negative   Leuk Esterase: Small / RBC: Negative /HPF / WBC 6-10   Sq Epi: x / Non Sq Epi: Few / Bacteria: Occasional    Culture - Urine (19 @ 16:45)    Specimen Source: .Urine Clean Catch (Midstream)    Culture Results:   >=3 organisms. Probable collection contamination.    Culture - Blood (19 @ 15:15)    Specimen Source: .Blood Blood-Peripheral    Culture Results:   No growth to date.      Radiology: all available radiological tests reviewed    EXAM:  CT ABDOMEN AND PELVIS OC                            PROCEDURE DATE:  2019          INTERPRETATION:  CLINICAL STATEMENT: diarrhea, recently treated for C.   difficile    TECHNIQUE: CT of the abdomen and pelvis was performed without IV   contrast. Oral contrast was administered.    COMPARISON: 2015    FINDINGS:    The lower chest is remarkable for pacemaker wires. There are minimal   bilateral pleural effusions    The liver is unremarkable. The gallbladder is not identified and may be   surgically absent. Dropped gallstones are suspected in the right abdomen.    The spleen, pancreas and adrenal glands are unremarkable.The kidneys   demonstrate small cysts. Additionally there is an indeterminate exophytic   lesion arising fromthe lateral lower pole of the right kidney which was   present on the prior study and appears similar or smaller consistent with   a benign etiology. The fluid-filled bladder appears intact.    There is no bowel obstruction. There is no CT evidence of appendicitis.   There is severe wall thickening of the colon diffusely which is   compatible with pseudomembranous colitis    There is no intraperitoneal free air.  There is no free fluid. The aorta   is not aneurysmal. There is atherosclerotic calcification of the   abdominal aorta and its branches.    There is no significant abdominal, retroperitoneal or pelvic   lymphadenopathy. The pelvic structures are unremarkable.    The osseous structures demonstrate osteopenia and degenerative changes.   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 There is heterotopic   ossification about the left hip..    IMPRESSION:    Severe diffuse colonic wall thickening may be secondary to C. difficile.   Recommend clinical and laboratory correlation.  Incidentally seen are bilateral renal cysts  Gallbladder has been removed since the prior study. Dropped gallstones   are suspected in the right abdomen      EXAM:  XR CHEST PORTABLE IMMED 1V                            PROCEDURE DATE:  2019        INTERPRETATION:  Portable chest radiograph dated 2019.    COMPARISON: 2017.    CLINICAL INFORMATION: Sepsis.  FINDINGS:    The airway is midline.  There are no airspace consolidations. Chronic left upper lobe scarring is   again noted.  There is no pleural effusion or pneumothorax.   The cardiac silhouette is normal size. Continued application of the left   subclavian vein pacemaker, leads are in the right heart.  The bones are normal.     IMPRESSION:    No acute cardiopulmonary findings.      Advanced directives addressed: full resuscitation

## 2019-07-01 NOTE — PROGRESS NOTE ADULT - SUBJECTIVE AND OBJECTIVE BOX
Mr. Garcia is an 88 yrs M with PMHx of A-fib on Coumadin s/p pacemaker, HTN, HLD , CKD  ,Recent C diff Infection presenting to the ED on June 24th, 2019, s/p fall and complaining of profuse diarrhea. Patient notes that he felt weak when ambulating to bathroom  and hit his left knee. Patient has been having diarrhea 3 times daily for 3 days prior to admission. Denies any aggravating or alleviating factors for diarrhea. He was also found to have fever 102F at home prior to presentation and was altered (speaking sentences that did not make sense).   Patient recently had hospital stay for PNA at [facility] in May 2019 before being discharged to rehab. At  rehab patient was diagnosed at treated for C. diff with PO vancomycin.  Baseline he uses a walker ,has limited mobility for the past few years .    SUBJECTIVE: Pt examined at bedside. Reports 3 loose a.m bowel movements. Pt he denies abdominal pain, n/v. Complains of itchiness and dry skin       REVIEW OF SYSTEMS:    CONSTITUTIONAL: No weakness, fevers or chills  EYES/ENT: No visual changes;  No vertigo or throat pain   NECK: No pain or stiffness  RESPIRATORY: No cough, wheezing, hemoptysis; No shortness of breath  CARDIOVASCULAR: No chest pain or palpitations  GASTROINTESTINAL: No abdominal or epigastric pain. No nausea, vomiting, or hematemesis;   GENITOURINARY: No dysuria, frequency or hematuria  NEUROLOGICAL: No numbness or weakness  SKIN: (+) itching and dryness   All other review of systems is negative unless indicated above    Vital Signs Last 24 Hrs  T(C): 36.7 (01 Jul 2019 17:02), Max: 37.2 (01 Jul 2019 05:45)  T(F): 98.1 (01 Jul 2019 17:02), Max: 99 (01 Jul 2019 05:45)  HR: 60 (01 Jul 2019 17:02) (60 - 63)  BP: 129/75 (01 Jul 2019 17:02) (124/48 - 129/75)  BP(mean): --  RR: 18 (01 Jul 2019 17:02) (17 - 18)  SpO2: 100% (01 Jul 2019 17:02) (98% - 100%)        PHYSICAL EXAM:    Constitutional: NAD, awake and alert, well-developed  HEENT: PERR, EOMI, Normal Hearing, MMM  Neck: Soft and supple, No LAD, No JVD  Gastrointestinal: Bowel Sounds present, soft, nontender, nondistended, no guarding, no rebound  Extremities: No peripheral edema  Vascular: 2+ peripheral pulses  Neurological: A/O x 3, no focal deficits  Musculoskeletal: 5/5 strength b/l upper and lower extremities  Skin: skin is dry    MEDICATIONS:  MEDICATIONS  (STANDING):  allopurinol 300 milliGRAM(s) Oral daily  amiodarone    Tablet 200 milliGRAM(s) Oral daily  ammonium lactate 12% Lotion 1 Application(s) Topical two times a day  atorvastatin 20 milliGRAM(s) Oral at bedtime  folic acid 1 milliGRAM(s) Oral daily  lactobacillus acidophilus 1 Tablet(s) Oral two times a day  levothyroxine 125 MICROGram(s) Oral daily  metoprolol tartrate 25 milliGRAM(s) Oral two times a day  multivitamin 1 Tablet(s) Oral daily  vancomycin    Solution 125 milliGRAM(s) Oral every 6 hours  warfarin 3 milliGRAM(s) Oral daily    < from: CT Abdomen and Pelvis w/ Oral Cont (06.25.19 @ 17:52) >  IMPRESSION:    Severe diffuse colonic wall thickening may be secondary to C. difficile.   Recommend clinical and laboratory correlation.  Incidentally seen are bilateral renal cysts  Gallbladder has been removed since the prior study. Dropped gallstones   are suspected in the right abdomen      < end of copied text >      LABS: All Labs Reviewed:    06-30    145  |  113<H>  |  26<H>  ----------------------------<  98  4.2   |  23  |  1.19    Ca    8.4<L>      30 Jun 2019 06:14      PT/INR - ( 01 Jul 2019 09:39 )   PT: 34.2 sec;   INR: 2.98 ratio               Blood Culture:     RADIOLOGY/EKG:    DVT PPX:    ADVANCED DIRECTIVE:    DISPOSITION: Mr. Garcia is an 88 yrs M with PMHx of A-fib on Coumadin s/p pacemaker, HTN, HLD , CKD  ,Recent C diff Infection presenting to the ED on June 24th, 2019, s/p fall and complaining of profuse diarrhea. Patient notes that he felt weak when ambulating to bathroom  and hit his left knee. Patient has been having diarrhea 3 times daily for 3 days prior to admission. Denies any aggravating or alleviating factors for diarrhea. He was also found to have fever 102F at home prior to presentation and was altered (speaking sentences that did not make sense).   Patient recently had hospital stay for PNA at [facility] in May 2019 before being discharged to rehab. At  rehab patient was diagnosed at treated for C. diff with PO vancomycin.  Baseline he uses a walker ,has limited mobility for the past few years .    SUBJECTIVE: Pt examined at bedside. Reports 3 loose a.m bowel movements. Pt he denies abdominal pain, n/v. Complains of itchiness and dry skin       REVIEW OF SYSTEMS:    CONSTITUTIONAL: No weakness, fevers or chills  EYES/ENT: No visual changes;  No vertigo or throat pain   NECK: No pain or stiffness  RESPIRATORY: No cough, wheezing, hemoptysis; No shortness of breath  CARDIOVASCULAR: No chest pain or palpitations  GASTROINTESTINAL: No abdominal or epigastric pain. No nausea, vomiting, or hematemesis;   GENITOURINARY: No dysuria, frequency or hematuria  NEUROLOGICAL: No numbness or weakness  SKIN: (+) itching and dryness   All other review of systems is negative unless indicated above    Vital Signs Last 24 Hrs  T(C): 36.7 (01 Jul 2019 17:02), Max: 37.2 (01 Jul 2019 05:45)  T(F): 98.1 (01 Jul 2019 17:02), Max: 99 (01 Jul 2019 05:45)  HR: 60 (01 Jul 2019 17:02) (60 - 63)  BP: 129/75 (01 Jul 2019 17:02) (124/48 - 129/75)  BP(mean): --  RR: 18 (01 Jul 2019 17:02) (17 - 18)  SpO2: 100% (01 Jul 2019 17:02) (98% - 100%)        PHYSICAL EXAM:    Constitutional: NAD, awake and alert, well-developed  HEENT: PERR, EOMI, Normal Hearing, MMM  Neck: Soft and supple, No LAD, No JVD  Gastrointestinal: Bowel Sounds present, soft, nontender, nondistended, no guarding, no rebound  Extremities: No peripheral edema  Vascular: 2+ peripheral pulses  Neurological: A/O x 3, no focal deficits  Musculoskeletal: 5/5 strength b/l upper and lower extremities  Skin: skin is dry    MEDICATIONS:  MEDICATIONS  (STANDING):  allopurinol 300 milliGRAM(s) Oral daily  amiodarone    Tablet 200 milliGRAM(s) Oral daily  ammonium lactate 12% Lotion 1 Application(s) Topical two times a day  atorvastatin 20 milliGRAM(s) Oral at bedtime  folic acid 1 milliGRAM(s) Oral daily  lactobacillus acidophilus 1 Tablet(s) Oral two times a day  levothyroxine 125 MICROGram(s) Oral daily  metoprolol tartrate 25 milliGRAM(s) Oral two times a day  multivitamin 1 Tablet(s) Oral daily  vancomycin    Solution 125 milliGRAM(s) Oral every 6 hours  warfarin 3 milliGRAM(s) Oral daily    < from: CT Abdomen and Pelvis w/ Oral Cont (06.25.19 @ 17:52) >  IMPRESSION:    Severe diffuse colonic wall thickening may be secondary to C. difficile.   Recommend clinical and laboratory correlation.  Incidentally seen are bilateral renal cysts  Gallbladder has been removed since the prior study. Dropped gallstones   are suspected in the right abdomen      < end of copied text >      LABS: All Labs Reviewed:    06-30    145  |  113<H>  |  26<H>  ----------------------------<  98  4.2   |  23  |  1.19    Ca    8.4<L>      30 Jun 2019 06:14      PT/INR - ( 01 Jul 2019 09:39 )   PT: 34.2 sec;   INR: 2.98 ratio

## 2019-07-01 NOTE — PROGRESS NOTE ADULT - ASSESSMENT
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     #Sepsis secondary to  recurrent CDiff infection  -In the ED received Vancomycin and cefepime, 3.2 NS liter bolus  - Sepsis present on admission, now resolved  - C. Diff Toxin positive in stool (6/24/19)  - continue PO Vancomycin (day #6)  - d/c Flagyl today as patient refusing placement of new IV line at this time  - patient to be discharged home on PO vancomycin taper  - ID recommendations appreciated  - Blood cultures x 2 - NGTD    # Atrial fibrillation   - continue amiodarone, Metoprolol (with hold parameters)  - INR 1.76 -> 2.1 today received Warfarin 5mg yesterday)  -f/u a.m PT/INR  - Warfarin/coumadin 3mg tonight (home dose 1 mg daily)    # FRANCINE on CKD 3  - stable  -No NSAIDs  -Baseline 1.6-1.8    #Hypothyroidism   -continue synthroid    #Gout  -continue Allopurinol     #Weakness likely secondary to Deconditioning and or Sepsis and or Diarrhea  -Physical therapy recommendations appreciated    #HTN  - BP well controlled  - DASH diet  -continue Metoprolol (with hold parameters)    #HLD  -continue  statin    #Dispo: continued hospitalization. Discharge planning for Monday? 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     #Sepsis secondary to  recurrent CDiff infection  -In the ED received Vancomycin and cefepime, 3.2 NS liter bolus  - Sepsis present on admission, now resolved  - C. Diff Toxin positive in stool (6/24/19)  - continue PO Vancomycin (day #6)  - d/c Flagyl today as patient refusing placement of new IV line at this time  - patient to be discharged home on PO vancomycin taper  - ID recommendations appreciated  - Blood cultures x 2 - NGTD    # Atrial fibrillation   - continue amiodarone, Metoprolol (with hold parameters)  - INR 2.98 today received Warfarin 3mg yesterday  -f/u a.m PT/INR  - Warfarin/coumadin 3mg tonight (home dose 1 mg daily)    # FRANCINE on CKD 3  - stable  -No NSAIDs  -Baseline 1.6-1.8    #Hypothyroidism   -continue synthroid    #Gout  -continue Allopurinol     #Weakness likely secondary to Deconditioning and or Sepsis and or Diarrhea  -Physical therapy recommendations appreciated    #HTN  - BP well controlled  - DASH diet  -continue Metoprolol (with hold parameters)    #HLD  -continue  statin    #Dispo: continued hospitalization. Discharge planning for Monday? to [facility]

## 2019-07-01 NOTE — DISCHARGE NOTE PROVIDER - HOSPITAL COURSE
Mr. Garcia is an 88 yrs M with PMHx of A-fib on Coumadin s/p pacemaker, HTN, HLD , CKD ,Recent C diff Infection presenting to the ED on June 24th, 2019, s/p fall and complaining of profuse diarrhea, weakness and s/p mechanical fall. Patient tested positive for Cdiff toxin in stool. He was started on oral vancomycin. He also received 3 days of IV Flagyl. Patient diarrhea improved and his appetite increased after antibiotics were started. At this time patient is medically cleared for discharge. Hospital course was complicated by borderline low blood pressures which improved with IVF. Patient's INR was subtherapeutic on admission (1.42), which resolved with increased doses of Coumadin (home dose 1mg daily).             Discharge not confirmed at this time. Mr. Garcia is an 88 yrs M with PMHx of A-fib on Coumadin s/p pacemaker, HTN, HLD , CKD ,Recent C diff Infection presenting to the ED on June 24th, 2019, s/p fall and complaining of profuse diarrhea, weakness and s/p mechanical fall. Patient tested positive for Cdiff toxin in stool. He was started on oral vancomycin. He also received 5 days of IV Flagyl. Patient diarrhea improved and his appetite increased after antibiotics were started. At this time patient is medically cleared for discharge. Hospital course was complicated by borderline low blood pressures which improved with IVF. Patient's INR was subtherapeutic on admission (1.42), which resolved with increased doses of Coumadin (home dose 1mg daily).     On day of discharge pts INR was found to be supratherapeutic (3.2), we are advising to hold Coumadin on night of discharge. Trend a.m INRs and continue when within therapeutic range (2-3).             Vital Signs Last 24 Hrs    T(C): 36.6 (02 Jul 2019 11:28), Max: 36.7 (01 Jul 2019 17:02)    T(F): 97.9 (02 Jul 2019 11:28), Max: 98.1 (01 Jul 2019 17:02)    HR: 62 (02 Jul 2019 11:28) (59 - 62)    BP: 115/49 (02 Jul 2019 11:28) (115/49 - 129/75)    BP(mean): --    RR: 16 (02 Jul 2019 11:28) (16 - 18)    SpO2: 100% (02 Jul 2019 11:28) (100% - 100%)        PHYSICAL EXAM:        Constitutional: NAD, awake and alert, well-developed    HEENT: PERR, EOMI, Normal Hearing, MMM    Neck: Soft and supple, No LAD, No JVD    Gastrointestinal: Bowel Sounds present, soft, nontender, nondistended, no guarding, no rebound    Extremities: Lower ext warm to touch b/l, RLE blister without oozing    Vascular: 2+ peripheral pulses    Neurological: A/O x 3, no focal deficits    Musculoskeletal: 5/5 strength b/l upper and lower extremities    Skin: skin is dry globally, B/L LE with purple discoloration 2/2 Venous Stasis

## 2019-07-02 VITALS
SYSTOLIC BLOOD PRESSURE: 115 MMHG | HEART RATE: 62 BPM | TEMPERATURE: 98 F | DIASTOLIC BLOOD PRESSURE: 49 MMHG | OXYGEN SATURATION: 100 % | RESPIRATION RATE: 16 BRPM

## 2019-07-02 LAB
INR BLD: 3.24 RATIO — HIGH (ref 0.88–1.16)
PROTHROM AB SERPL-ACNC: 37.3 SEC — HIGH (ref 10–12.9)

## 2019-07-02 RX ORDER — LACTOBACILLUS ACIDOPHILUS 100MM CELL
1 CAPSULE ORAL
Qty: 0 | Refills: 0 | DISCHARGE
Start: 2019-07-02

## 2019-07-02 RX ORDER — VANCOMYCIN HCL 1 G
125 VIAL (EA) INTRAVENOUS
Qty: 125 | Refills: 0
Start: 2019-07-02

## 2019-07-02 RX ORDER — SOD,AMMONIUM,POTASSIUM LACTATE
1 CREAM (GRAM) TOPICAL
Qty: 0 | Refills: 0 | DISCHARGE
Start: 2019-07-02

## 2019-07-02 RX ORDER — ACETAMINOPHEN 500 MG
2 TABLET ORAL
Qty: 0 | Refills: 0 | DISCHARGE
Start: 2019-07-02

## 2019-07-02 RX ORDER — MAGNESIUM OXIDE 400 MG ORAL TABLET 241.3 MG
1 TABLET ORAL
Qty: 0 | Refills: 0 | DISCHARGE

## 2019-07-02 RX ADMIN — Medication 25 MILLIGRAM(S): at 06:05

## 2019-07-02 RX ADMIN — Medication 1 TABLET(S): at 11:26

## 2019-07-02 RX ADMIN — Medication 125 MILLIGRAM(S): at 00:25

## 2019-07-02 RX ADMIN — Medication 1 MILLIGRAM(S): at 11:26

## 2019-07-02 RX ADMIN — Medication 300 MILLIGRAM(S): at 11:26

## 2019-07-02 RX ADMIN — Medication 1 APPLICATION(S): at 06:06

## 2019-07-02 RX ADMIN — Medication 1 TABLET(S): at 06:04

## 2019-07-02 RX ADMIN — Medication 125 MILLIGRAM(S): at 06:05

## 2019-07-02 RX ADMIN — AMIODARONE HYDROCHLORIDE 200 MILLIGRAM(S): 400 TABLET ORAL at 06:05

## 2019-07-02 RX ADMIN — Medication 125 MILLIGRAM(S): at 11:26

## 2019-07-02 RX ADMIN — Medication 125 MICROGRAM(S): at 06:05

## 2019-07-02 NOTE — PROGRESS NOTE ADULT - ASSESSMENT
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     #Sepsis secondary to  recurrent CDiff infection  - Sepsis present on admission, now resolved  - C. Diff Toxin positive in stool (6/24/19)  - continue PO Vancomycin (day #8)  - patient to be discharged to rehab on PO vancomycin taper  - ID recommendations: continue PO vanco Taper; case d/w Dr. Green   - Blood cultures x 2 - NGTD    # Atrial fibrillation   - continue amiodarone, Metoprolol (with hold parameters)  - INR 3.2 today received Warfarin 3mg yesterday  - Hold Warfarin/coumadin 3mg tonight, resume when INR within therapeutic range    # FRANCINE on CKD 3  - stable  -No NSAIDs  -Baseline 1.6-1.8    #Hypothyroidism   -continue home synthroid     #Gout  -continue home Allopurinol     #Weakness likely secondary to Deconditioning and or Sepsis and or Diarrhea  -Physical therapy recommendations appreciated    #HTN  - BP well controlled  - DASH diet  -continue Metoprolol (with hold parameters)    #HLD  -continue home statin

## 2019-07-02 NOTE — PROGRESS NOTE ADULT - SUBJECTIVE AND OBJECTIVE BOX
Pt has been seen and examined with FP resident, resident supervised agree with a/p       Patient is a 88y old  Male who presents with a chief complaint of Fever and diarrhea. Pt resting comfortably. Had 3 BMs today, slightly formed. Afebrile. Awaiting [facility] placement.         PHYSICAL EXAM:  Vital Signs Last 24 Hrs  T(C): 36.6 (02 Jul 2019 11:28), Max: 36.7 (01 Jul 2019 17:02)  T(F): 97.9 (02 Jul 2019 11:28), Max: 98.1 (01 Jul 2019 17:02)  HR: 62 (02 Jul 2019 11:28) (59 - 62)  BP: 115/49 (02 Jul 2019 11:28) (115/49 - 129/75)  BP(mean): --  RR: 16 (02 Jul 2019 11:28) (16 - 18)  SpO2: 100% (02 Jul 2019 11:28) (100% - 100%)    general- comfortable   -[id number]-aeeb,cta  -cvs- irregularly irregular  -p/a-soft,bs+  Ext: b/l venous stasis changes, large blister to right shin      A/P  #C. diff  - continue PO vanco, will need 6 week taper  - ID following  - dispo to [facility] when bed available

## 2019-07-02 NOTE — PROGRESS NOTE ADULT - ASSESSMENT
88 yrs M  PMHx of A-fib on Coumadin s/p pacemaker, HTN, HLD , CKD  ,Recent C diff Infection presenting to the ED s/p fall last night ,fever and  Persistent diarrhea  . Patient reports that he using the bathroom and when he got up to walk away, his legs felt weak and he slipped and fell hit his Left knee. As per the patient he have been having diarrhoea for the past 3 days, about 3 episodes /day. He was admitted at [facility] for the Pneumonia and as per the chart note ?UTI in the month of May 2019 and discharged to the Rehab .While he was in the Rehab he had diagnosed with the C diff and was treated with the Vancomycin and in addition he had Cellulitis of face and leg. Here febrile to 102, wbc ct 21, was given oral vancomycin/IV flagyl/fluids and vanco/cefepime.      1. diarrhea. fever. leukocytosis. recurrent CDAD  - slowly improving, diarrhea resolving, wbc ct normalized  - CT abd/pelvis reviewed  - on oral vancomycin [id number] #8  - s/p IV flagyl [id number] #5  - blood cx no growth, urine cx contaminated  - continue with above cdad tx  - plan for PO vancomycin 6 week long taper on dc  - probiotics BID  - monitor temps  - supportive care  - fu cbc    2. other issues -care per medicine

## 2019-07-02 NOTE — PROGRESS NOTE ADULT - SUBJECTIVE AND OBJECTIVE BOX
Date of service: 19 @ 11:51    pt seen and examined  feeling better   more formed stools   no abd pain      ROS: no fever or chills; denies dizziness, no HA, no SOB or cough, no  constipation; no dysuria, no urinary frequency, no legs pain, no rashes    MEDICATIONS  (STANDING):  allopurinol 300 milliGRAM(s) Oral daily  amiodarone    Tablet 200 milliGRAM(s) Oral daily  ammonium lactate 12% Lotion 1 Application(s) Topical two times a day  atorvastatin 20 milliGRAM(s) Oral at bedtime  folic acid 1 milliGRAM(s) Oral daily  lactobacillus acidophilus 1 Tablet(s) Oral two times a day  levothyroxine 125 MICROGram(s) Oral daily  metoprolol tartrate 25 milliGRAM(s) Oral two times a day  multivitamin 1 Tablet(s) Oral daily  vancomycin    Solution 125 milliGRAM(s) Oral every 6 hours  warfarin 3 milliGRAM(s) Oral daily    Vital Signs Last 24 Hrs  T(C): 36.6 (2019 11:28), Max: 36.7 (2019 17:02)  T(F): 97.9 (2019 11:28), Max: 98.1 (2019 17:02)  HR: 62 (2019 11:28) (59 - 62)  BP: 115/49 (2019 11:28) (115/49 - 129/75)  BP(mean): --  RR: 16 (2019 11:28) (16 - 18)  SpO2: 100% (2019 11:28) (100% - 100%)    PE:  Constitutional: frail looking  HEENT: NC/AT, EOMI, PERRLA, conjunctivae clear; ears and nose atraumatic; pharynx benign  Neck: supple; thyroid not palpable  Back: no tenderness  Respiratory: respiratory effort normal; clear to auscultation  Cardiovascular: S1S2 regular, no murmurs  Abdomen: soft, not tender, not distended, positive BS; liver and spleen WNL  Genitourinary: no suprapubic tenderness  Lymphatic: no LN palpable  Musculoskeletal: no muscle tenderness, no joint swelling or tenderness  Extremities: no pedal edema  Neurological/ Psychiatric:  moving all extremities  Skin: no rashes; no palpable lesions    Labs: all available labs reviewed                    Urinalysis Basic - ( 2019 16:45 )    Color: Yellow / Appearance: Clear / S.010 / pH: x  Gluc: x / Ketone: Negative  / Bili: Negative / Urobili: Negative mg/dL   Blood: x / Protein: Negative mg/dL / Nitrite: Negative   Leuk Esterase: Small / RBC: Negative /HPF / WBC 6-10   Sq Epi: x / Non Sq Epi: Few / Bacteria: Occasional    Culture - Urine (19 @ 16:45)    Specimen Source: .Urine Clean Catch (Midstream)    Culture Results:   >=3 organisms. Probable collection contamination.    Culture - Blood (19 @ 15:15)    Specimen Source: .Blood Blood-Peripheral    Culture Results:   No growth to date.      Radiology: all available radiological tests reviewed    EXAM:  CT ABDOMEN AND PELVIS OC                            PROCEDURE DATE:  2019          INTERPRETATION:  CLINICAL STATEMENT: diarrhea, recently treated for C.   difficile    TECHNIQUE: CT of the abdomen and pelvis was performed without IV   contrast. Oral contrast was administered.    COMPARISON: 2015    FINDINGS:    The lower chest is remarkable for pacemaker wires. There are minimal   bilateral pleural effusions    The liver is unremarkable. The gallbladder is not identified and may be   surgically absent. Dropped gallstones are suspected in the right abdomen.    The spleen, pancreas and adrenal glands are unremarkable.The kidneys   demonstrate small cysts. Additionally there is an indeterminate exophytic   lesion arising fromthe lateral lower pole of the right kidney which was   present on the prior study and appears similar or smaller consistent with   a benign etiology. The fluid-filled bladder appears intact.    There is no bowel obstruction. There is no CT evidence of appendicitis.   There is severe wall thickening of the colon diffusely which is   compatible with pseudomembranous colitis    There is no intraperitoneal free air.  There is no free fluid. The aorta   is not aneurysmal. There is atherosclerotic calcification of the   abdominal aorta and its branches.    There is no significant abdominal, retroperitoneal or pelvic   lymphadenopathy. The pelvic structures are unremarkable.    The osseous structures demonstrate osteopenia and degenerative changes.   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 There is heterotopic   ossification about the left hip..    IMPRESSION:    Severe diffuse colonic wall thickening may be secondary to C. difficile.   Recommend clinical and laboratory correlation.  Incidentally seen are bilateral renal cysts  Gallbladder has been removed since the prior study. Dropped gallstones   are suspected in the right abdomen      EXAM:  XR CHEST PORTABLE IMMED 1V                            PROCEDURE DATE:  2019        INTERPRETATION:  Portable chest radiograph dated 2019.    COMPARISON: 2017.    CLINICAL INFORMATION: Sepsis.  FINDINGS:    The airway is midline.  There are no airspace consolidations. Chronic left upper lobe scarring is   again noted.  There is no pleural effusion or pneumothorax.   The cardiac silhouette is normal size. Continued application of the left   subclavian vein pacemaker, leads are in the right heart.  The bones are normal.     IMPRESSION:    No acute cardiopulmonary findings.      Advanced directives addressed: full resuscitation

## 2019-07-02 NOTE — PROGRESS NOTE ADULT - SUBJECTIVE AND OBJECTIVE BOX
Mr. Garcia is an 88 yrs M with PMHx of A-fib on Coumadin s/p pacemaker, HTN, HLD , CKD  ,Recent C diff Infection presenting to the ED on June 24th, 2019, s/p fall and complaining of profuse diarrhea. Patient notes that he felt weak when ambulating to bathroom  and hit his left knee. Patient has been having diarrhea 3 times daily for 3 days prior to admission. Denies any aggravating or alleviating factors for diarrhea. He was also found to have fever 102F at home prior to presentation and was altered (speaking sentences that did not make sense).   Patient recently had hospital stay for PNA at [facility] in May 2019 before being discharged to rehab. At  rehab patient was diagnosed at treated for C. diff with PO vancomycin.  Baseline he uses a walker ,has limited mobility for the past few years .    SUBJECTIVE: Pt examined at bedside. Reports no a.m bowel movements. Pt he denies abdominal pain, n/v. On examination pt was gfound to have purplish discoluration possible 2/2 to Venous Stasis, Bulus blister was seen on his RLE. No events over night, his diarrhea sxs have resolved, pt is stable for D/C. Will continue PO vanco-taper OutPt per ID recommendation.     Vital Signs Last 24 Hrs  T(C): 36.6 (02 Jul 2019 11:28), Max: 36.7 (01 Jul 2019 17:02)  T(F): 97.9 (02 Jul 2019 11:28), Max: 98.1 (01 Jul 2019 17:02)  HR: 62 (02 Jul 2019 11:28) (59 - 62)  BP: 115/49 (02 Jul 2019 11:28) (115/49 - 129/75)  BP(mean): --  RR: 16 (02 Jul 2019 11:28) (16 - 18)  SpO2: 100% (02 Jul 2019 11:28) (100% - 100%)      PHYSICAL EXAM:    Constitutional: NAD, awake and alert, well-developed  HEENT: PERR, EOMI, Normal Hearing, MMM  Neck: Soft and supple, No LAD, No JVD  Gastrointestinal: Bowel Sounds present, soft, nontender, nondistended, no guarding, no rebound  Extremities: Lower ext warm to touch b/l, RLE blister without oozing  Vascular: 2+ peripheral pulses  Neurological: A/O x 3, no focal deficits  Musculoskeletal: 5/5 strength b/l upper and lower extremities  Skin: skin is dry globally, B/L LE with purple discoloration 2/2 Venous Stasis    < from: CT Abdomen and Pelvis w/ Oral Cont (06.25.19 @ 17:52) >  Severe diffuse colonic wall thickening may be secondary to C. difficile.   Recommend clinical and laboratory correlation.  Incidentally seen are bilateral renal cysts  Gallbladder has been removed since the prior study. Dropped gallstones   are suspected in the right abdomen

## 2019-07-02 NOTE — PROGRESS NOTE ADULT - PROVIDER SPECIALTY LIST ADULT
Family Medicine
Hospitalist
Infectious Disease
Family Medicine
Family Medicine

## 2019-07-02 NOTE — PROGRESS NOTE ADULT - REASON FOR ADMISSION
Fever ,fall ,diarrhoea
Sepsis 2/2 recurrent Cdiff
Fever ,fall ,diarrhoea

## 2019-07-08 DIAGNOSIS — N17.9 ACUTE KIDNEY FAILURE, UNSPECIFIED: ICD-10-CM

## 2019-07-08 DIAGNOSIS — E43 UNSPECIFIED SEVERE PROTEIN-CALORIE MALNUTRITION: ICD-10-CM

## 2019-07-08 DIAGNOSIS — G62.9 POLYNEUROPATHY, UNSPECIFIED: ICD-10-CM

## 2019-07-08 DIAGNOSIS — E78.5 HYPERLIPIDEMIA, UNSPECIFIED: ICD-10-CM

## 2019-07-08 DIAGNOSIS — R19.7 DIARRHEA, UNSPECIFIED: ICD-10-CM

## 2019-07-08 DIAGNOSIS — Z90.49 ACQUIRED ABSENCE OF OTHER SPECIFIED PARTS OF DIGESTIVE TRACT: ICD-10-CM

## 2019-07-08 DIAGNOSIS — I87.8 OTHER SPECIFIED DISORDERS OF VEINS: ICD-10-CM

## 2019-07-08 DIAGNOSIS — M10.9 GOUT, UNSPECIFIED: ICD-10-CM

## 2019-07-08 DIAGNOSIS — Z79.01 LONG TERM (CURRENT) USE OF ANTICOAGULANTS: ICD-10-CM

## 2019-07-08 DIAGNOSIS — E03.9 HYPOTHYROIDISM, UNSPECIFIED: ICD-10-CM

## 2019-07-08 DIAGNOSIS — I12.9 HYPERTENSIVE CHRONIC KIDNEY DISEASE WITH STAGE 1 THROUGH STAGE 4 CHRONIC KIDNEY DISEASE, OR UNSPECIFIED CHRONIC KIDNEY DISEASE: ICD-10-CM

## 2019-07-08 DIAGNOSIS — S80.821A BLISTER (NONTHERMAL), RIGHT LOWER LEG, INITIAL ENCOUNTER: ICD-10-CM

## 2019-07-08 DIAGNOSIS — I48.91 UNSPECIFIED ATRIAL FIBRILLATION: ICD-10-CM

## 2019-07-08 DIAGNOSIS — N18.3 CHRONIC KIDNEY DISEASE, STAGE 3 (MODERATE): ICD-10-CM

## 2019-07-08 DIAGNOSIS — I73.9 PERIPHERAL VASCULAR DISEASE, UNSPECIFIED: ICD-10-CM

## 2019-07-08 DIAGNOSIS — Z88.0 ALLERGY STATUS TO PENICILLIN: ICD-10-CM

## 2019-07-08 DIAGNOSIS — Z95.0 PRESENCE OF CARDIAC PACEMAKER: ICD-10-CM

## 2019-07-08 DIAGNOSIS — A04.71 ENTEROCOLITIS DUE TO CLOSTRIDIUM DIFFICILE, RECURRENT: ICD-10-CM

## 2019-07-08 DIAGNOSIS — Z96.643 PRESENCE OF ARTIFICIAL HIP JOINT, BILATERAL: ICD-10-CM
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
## 2019-07-08 DIAGNOSIS — A41.9 SEPSIS, UNSPECIFIED ORGANISM: ICD-10-CM

## 2019-07-31 ENCOUNTER — APPOINTMENT (OUTPATIENT)
Dept: ELECTROPHYSIOLOGY | Facility: CLINIC | Age: 84
End: 2019-07-31
Payer: MEDICARE

## 2019-07-31 PROCEDURE — 93293 PM PHONE R-STRIP DEVICE EVAL: CPT

## 2019-08-28 PROBLEM — J18.9 PNEUMONIA, UNSPECIFIED ORGANISM: Chronic | Status: ACTIVE | Noted: 2019-06-24

## 2019-08-28 PROBLEM — Z86.19 PERSONAL HISTORY OF OTHER INFECTIOUS AND PARASITIC DISEASES: Chronic | Status: ACTIVE | Noted: 2019-06-24

## 2019-09-12 ENCOUNTER — APPOINTMENT (OUTPATIENT)
Dept: UROLOGY | Facility: CLINIC | Age: 84
End: 2019-09-12

## 2019-10-09 ENCOUNTER — INPATIENT (INPATIENT)
Facility: HOSPITAL | Age: 84
LOS: 6 days | Discharge: NURSING, REHAB OR LONG-TERM CARE | DRG: 871 | End: 2019-10-16
Attending: INTERNAL MEDICINE | Admitting: INTERNAL MEDICINE
Payer: MEDICARE

## 2019-10-09 VITALS
OXYGEN SATURATION: 99 % | HEART RATE: 86 BPM | TEMPERATURE: 99 F | SYSTOLIC BLOOD PRESSURE: 118 MMHG | DIASTOLIC BLOOD PRESSURE: 59 MMHG | RESPIRATION RATE: 18 BRPM | WEIGHT: 184.97 LBS

## 2019-10-09 DIAGNOSIS — Z96.643 PRESENCE OF ARTIFICIAL HIP JOINT, BILATERAL: Chronic | ICD-10-CM

## 2019-10-09 DIAGNOSIS — Z90.49 ACQUIRED ABSENCE OF OTHER SPECIFIED PARTS OF DIGESTIVE TRACT: Chronic | ICD-10-CM

## 2019-10-09 DIAGNOSIS — N39.0 URINARY TRACT INFECTION, SITE NOT SPECIFIED: ICD-10-CM

## 2019-10-09 LAB
ALBUMIN SERPL ELPH-MCNC: 3 G/DL — LOW (ref 3.3–5)
ALP SERPL-CCNC: 105 U/L — SIGNIFICANT CHANGE UP (ref 40–120)
ALT FLD-CCNC: 58 U/L — SIGNIFICANT CHANGE UP (ref 12–78)
ANION GAP SERPL CALC-SCNC: 6 MMOL/L — SIGNIFICANT CHANGE UP (ref 5–17)
APPEARANCE UR: ABNORMAL
APTT BLD: 35.6 SEC — SIGNIFICANT CHANGE UP (ref 27.5–36.3)
AST SERPL-CCNC: 45 U/L — HIGH (ref 15–37)
BASOPHILS # BLD AUTO: 0.01 K/UL — SIGNIFICANT CHANGE UP (ref 0–0.2)
BASOPHILS NFR BLD AUTO: 0.1 % — SIGNIFICANT CHANGE UP (ref 0–2)
BILIRUB SERPL-MCNC: 0.9 MG/DL — SIGNIFICANT CHANGE UP (ref 0.2–1.2)
BILIRUB UR-MCNC: NEGATIVE — SIGNIFICANT CHANGE UP
BUN SERPL-MCNC: 33 MG/DL — HIGH (ref 7–23)
CALCIUM SERPL-MCNC: 8.5 MG/DL — SIGNIFICANT CHANGE UP (ref 8.5–10.1)
CHLORIDE SERPL-SCNC: 105 MMOL/L — SIGNIFICANT CHANGE UP (ref 96–108)
CO2 SERPL-SCNC: 28 MMOL/L — SIGNIFICANT CHANGE UP (ref 22–31)
COLOR SPEC: YELLOW — SIGNIFICANT CHANGE UP
CREAT SERPL-MCNC: 1.85 MG/DL — HIGH (ref 0.5–1.3)
DIFF PNL FLD: ABNORMAL
EOSINOPHIL # BLD AUTO: 0.02 K/UL — SIGNIFICANT CHANGE UP (ref 0–0.5)
EOSINOPHIL NFR BLD AUTO: 0.2 % — SIGNIFICANT CHANGE UP (ref 0–6)
GLUCOSE SERPL-MCNC: 103 MG/DL — HIGH (ref 70–99)
GLUCOSE UR QL: NEGATIVE MG/DL — SIGNIFICANT CHANGE UP
HCT VFR BLD CALC: 37 % — LOW (ref 39–50)
HGB BLD-MCNC: 11.4 G/DL — LOW (ref 13–17)
IMM GRANULOCYTES NFR BLD AUTO: 0.5 % — SIGNIFICANT CHANGE UP (ref 0–1.5)
INR BLD: 2.12 RATIO — HIGH (ref 0.88–1.16)
KETONES UR-MCNC: ABNORMAL
LACTATE SERPL-SCNC: 2.7 MMOL/L — HIGH (ref 0.7–2)
LEUKOCYTE ESTERASE UR-ACNC: ABNORMAL
LYMPHOCYTES # BLD AUTO: 0.76 K/UL — LOW (ref 1–3.3)
LYMPHOCYTES # BLD AUTO: 5.7 % — LOW (ref 13–44)
MCHC RBC-ENTMCNC: 28.6 PG — SIGNIFICANT CHANGE UP (ref 27–34)
MCHC RBC-ENTMCNC: 30.8 GM/DL — LOW (ref 32–36)
MCV RBC AUTO: 92.7 FL — SIGNIFICANT CHANGE UP (ref 80–100)
MONOCYTES # BLD AUTO: 1.03 K/UL — HIGH (ref 0–0.9)
MONOCYTES NFR BLD AUTO: 7.8 % — SIGNIFICANT CHANGE UP (ref 2–14)
NEUTROPHILS # BLD AUTO: 11.35 K/UL — HIGH (ref 1.8–7.4)
NEUTROPHILS NFR BLD AUTO: 85.7 % — HIGH (ref 43–77)
NITRITE UR-MCNC: NEGATIVE — SIGNIFICANT CHANGE UP
NT-PROBNP SERPL-SCNC: 1338 PG/ML — HIGH (ref 0–450)
PH UR: 5 — SIGNIFICANT CHANGE UP (ref 5–8)
PLATELET # BLD AUTO: 157 K/UL — SIGNIFICANT CHANGE UP (ref 150–400)
POTASSIUM SERPL-MCNC: 4.7 MMOL/L — SIGNIFICANT CHANGE UP (ref 3.5–5.3)
POTASSIUM SERPL-SCNC: 4.7 MMOL/L — SIGNIFICANT CHANGE UP (ref 3.5–5.3)
PROT SERPL-MCNC: 6.3 GM/DL — SIGNIFICANT CHANGE UP (ref 6–8.3)
PROT UR-MCNC: 30 MG/DL
PROTHROM AB SERPL-ACNC: 24.1 SEC — HIGH (ref 10–12.9)
RAPID RVP RESULT: SIGNIFICANT CHANGE UP
RBC # BLD: 3.99 M/UL — LOW (ref 4.2–5.8)
RBC # FLD: 17.5 % — HIGH (ref 10.3–14.5)
SODIUM SERPL-SCNC: 139 MMOL/L — SIGNIFICANT CHANGE UP (ref 135–145)
SP GR SPEC: 1.01 — SIGNIFICANT CHANGE UP (ref 1.01–1.02)
UROBILINOGEN FLD QL: NEGATIVE MG/DL — SIGNIFICANT CHANGE UP
WBC # BLD: 13.23 K/UL — HIGH (ref 3.8–10.5)
WBC # FLD AUTO: 13.23 K/UL — HIGH (ref 3.8–10.5)

## 2019-10-09 PROCEDURE — G0008: CPT

## 2019-10-09 PROCEDURE — 80048 BASIC METABOLIC PNL TOTAL CA: CPT

## 2019-10-09 PROCEDURE — 97116 GAIT TRAINING THERAPY: CPT | Mod: GP

## 2019-10-09 PROCEDURE — 83605 ASSAY OF LACTIC ACID: CPT

## 2019-10-09 PROCEDURE — 36415 COLL VENOUS BLD VENIPUNCTURE: CPT

## 2019-10-09 PROCEDURE — 93010 ELECTROCARDIOGRAM REPORT: CPT

## 2019-10-09 PROCEDURE — 83880 ASSAY OF NATRIURETIC PEPTIDE: CPT

## 2019-10-09 PROCEDURE — 85610 PROTHROMBIN TIME: CPT

## 2019-10-09 PROCEDURE — 85027 COMPLETE CBC AUTOMATED: CPT

## 2019-10-09 PROCEDURE — 97530 THERAPEUTIC ACTIVITIES: CPT | Mod: GP

## 2019-10-09 PROCEDURE — 71045 X-RAY EXAM CHEST 1 VIEW: CPT | Mod: 26

## 2019-10-09 PROCEDURE — 85730 THROMBOPLASTIN TIME PARTIAL: CPT

## 2019-10-09 PROCEDURE — 97162 PT EVAL MOD COMPLEX 30 MIN: CPT | Mod: GP

## 2019-10-09 PROCEDURE — 94640 AIRWAY INHALATION TREATMENT: CPT

## 2019-10-09 PROCEDURE — 90686 IIV4 VACC NO PRSV 0.5 ML IM: CPT

## 2019-10-09 RX ORDER — FOLIC ACID 0.8 MG
1 TABLET ORAL DAILY
Refills: 0 | Status: DISCONTINUED | OUTPATIENT
Start: 2019-10-09 | End: 2019-10-16

## 2019-10-09 RX ORDER — SODIUM CHLORIDE 9 MG/ML
2100 INJECTION INTRAMUSCULAR; INTRAVENOUS; SUBCUTANEOUS ONCE
Refills: 0 | Status: COMPLETED | OUTPATIENT
Start: 2019-10-09 | End: 2019-10-09

## 2019-10-09 RX ORDER — SODIUM CHLORIDE 9 MG/ML
1000 INJECTION INTRAMUSCULAR; INTRAVENOUS; SUBCUTANEOUS ONCE
Refills: 0 | Status: COMPLETED | OUTPATIENT
Start: 2019-10-09 | End: 2019-10-09

## 2019-10-09 RX ORDER — ALLOPURINOL 300 MG
300 TABLET ORAL DAILY
Refills: 0 | Status: DISCONTINUED | OUTPATIENT
Start: 2019-10-09 | End: 2019-10-16

## 2019-10-09 RX ORDER — AMIODARONE HYDROCHLORIDE 400 MG/1
200 TABLET ORAL DAILY
Refills: 0 | Status: DISCONTINUED | OUTPATIENT
Start: 2019-10-09 | End: 2019-10-16

## 2019-10-09 RX ORDER — VANCOMYCIN HCL 1 G
125 VIAL (EA) INTRAVENOUS EVERY 6 HOURS
Refills: 0 | Status: DISCONTINUED | OUTPATIENT
Start: 2019-10-09 | End: 2019-10-16

## 2019-10-09 RX ORDER — ROSUVASTATIN CALCIUM 5 MG/1
1 TABLET ORAL
Qty: 0 | Refills: 0 | DISCHARGE

## 2019-10-09 RX ORDER — WARFARIN SODIUM 2.5 MG/1
1 TABLET ORAL DAILY
Refills: 0 | Status: COMPLETED | OUTPATIENT
Start: 2019-10-09 | End: 2019-10-12

## 2019-10-09 RX ORDER — ATORVASTATIN CALCIUM 80 MG/1
20 TABLET, FILM COATED ORAL AT BEDTIME
Refills: 0 | Status: DISCONTINUED | OUTPATIENT
Start: 2019-10-09 | End: 2019-10-16

## 2019-10-09 RX ORDER — VANCOMYCIN HCL 1 G
1250 VIAL (EA) INTRAVENOUS ONCE
Refills: 0 | Status: COMPLETED | OUTPATIENT
Start: 2019-10-09 | End: 2019-10-09

## 2019-10-09 RX ORDER — CEFEPIME 1 G/1
1000 INJECTION, POWDER, FOR SOLUTION INTRAMUSCULAR; INTRAVENOUS DAILY
Refills: 0 | Status: DISCONTINUED | OUTPATIENT
Start: 2019-10-09 | End: 2019-10-11

## 2019-10-09 RX ORDER — LEVOTHYROXINE SODIUM 125 MCG
125 TABLET ORAL DAILY
Refills: 0 | Status: DISCONTINUED | OUTPATIENT
Start: 2019-10-09 | End: 2019-10-16

## 2019-10-09 RX ORDER — SOD,AMMONIUM,POTASSIUM LACTATE
1 CREAM (GRAM) TOPICAL
Refills: 0 | Status: DISCONTINUED | OUTPATIENT
Start: 2019-10-09 | End: 2019-10-16

## 2019-10-09 RX ORDER — ZOLPIDEM TARTRATE 10 MG/1
1 TABLET ORAL
Qty: 0 | Refills: 0 | DISCHARGE

## 2019-10-09 RX ORDER — SODIUM CHLORIDE 9 MG/ML
1000 INJECTION INTRAMUSCULAR; INTRAVENOUS; SUBCUTANEOUS
Refills: 0 | Status: DISCONTINUED | OUTPATIENT
Start: 2019-10-09 | End: 2019-10-10

## 2019-10-09 RX ORDER — SODIUM CHLORIDE 9 MG/ML
500 INJECTION INTRAMUSCULAR; INTRAVENOUS; SUBCUTANEOUS ONCE
Refills: 0 | Status: COMPLETED | OUTPATIENT
Start: 2019-10-09 | End: 2019-10-09

## 2019-10-09 RX ORDER — ACETAMINOPHEN 500 MG
650 TABLET ORAL EVERY 6 HOURS
Refills: 0 | Status: DISCONTINUED | OUTPATIENT
Start: 2019-10-09 | End: 2019-10-16

## 2019-10-09 RX ORDER — CEFEPIME 1 G/1
1000 INJECTION, POWDER, FOR SOLUTION INTRAMUSCULAR; INTRAVENOUS ONCE
Refills: 0 | Status: COMPLETED | OUTPATIENT
Start: 2019-10-09 | End: 2019-10-09

## 2019-10-09 RX ORDER — METOPROLOL TARTRATE 50 MG
25 TABLET ORAL
Refills: 0 | Status: DISCONTINUED | OUTPATIENT
Start: 2019-10-09 | End: 2019-10-16

## 2019-10-09 RX ORDER — ACETAMINOPHEN 500 MG
975 TABLET ORAL ONCE
Refills: 0 | Status: COMPLETED | OUTPATIENT
Start: 2019-10-09 | End: 2019-10-09

## 2019-10-09 RX ORDER — CEFEPIME 1 G/1
1000 INJECTION, POWDER, FOR SOLUTION INTRAMUSCULAR; INTRAVENOUS ONCE
Refills: 0 | Status: DISCONTINUED | OUTPATIENT
Start: 2019-10-09 | End: 2019-10-09

## 2019-10-09 RX ORDER — VANCOMYCIN HCL 1 G
1000 VIAL (EA) INTRAVENOUS ONCE
Refills: 0 | Status: DISCONTINUED | OUTPATIENT
Start: 2019-10-09 | End: 2019-10-09

## 2019-10-09 RX ADMIN — SODIUM CHLORIDE 500 MILLILITER(S): 9 INJECTION INTRAMUSCULAR; INTRAVENOUS; SUBCUTANEOUS at 19:17

## 2019-10-09 RX ADMIN — Medication 975 MILLIGRAM(S): at 21:26

## 2019-10-09 RX ADMIN — CEFEPIME 1000 MILLIGRAM(S): 1 INJECTION, POWDER, FOR SOLUTION INTRAMUSCULAR; INTRAVENOUS at 19:20

## 2019-10-09 RX ADMIN — Medication 1250 MILLIGRAM(S): at 20:28

## 2019-10-09 RX ADMIN — SODIUM CHLORIDE 500 MILLILITER(S): 9 INJECTION INTRAMUSCULAR; INTRAVENOUS; SUBCUTANEOUS at 18:16

## 2019-10-09 RX ADMIN — SODIUM CHLORIDE 1000 MILLILITER(S): 9 INJECTION INTRAMUSCULAR; INTRAVENOUS; SUBCUTANEOUS at 22:51

## 2019-10-09 RX ADMIN — SODIUM CHLORIDE 2100 MILLILITER(S): 9 INJECTION INTRAMUSCULAR; INTRAVENOUS; SUBCUTANEOUS at 19:15

## 2019-10-09 RX ADMIN — Medication 166.67 MILLIGRAM(S): at 19:28

## 2019-10-09 RX ADMIN — SODIUM CHLORIDE 2100 MILLILITER(S): 9 INJECTION INTRAMUSCULAR; INTRAVENOUS; SUBCUTANEOUS at 20:15

## 2019-10-09 RX ADMIN — Medication 975 MILLIGRAM(S): at 21:41

## 2019-10-09 NOTE — ED PROVIDER NOTE - ATTENDING CONTRIBUTION TO CARE
Pt eval, treatment and plan contemporaneously with ACP Ali. Documentation as noted by xavier Phillip accurately records my words and actions. Mustapha RAMSEY

## 2019-10-09 NOTE — ED PROVIDER NOTE - SKIN, MLM
Skin normal color for race, warm, dry and intact. +Chronic skin excoriations b/l LE's. Small abrasion noted dorsal aspect of forearm. DSD in place. No bleeding. No signs of infection

## 2019-10-09 NOTE — ED PROVIDER NOTE - PMH
Afib    H/O Clostridium difficile infection    HLD (hyperlipidemia)    HTN (hypertension)    Neuropathy    Pacemaker    Pneumonia    PVD (peripheral vascular disease)

## 2019-10-09 NOTE — H&P ADULT - ASSESSMENT
89 yo Male presented with fever, cough and cough.     A/P:    1.  Sepsis  UTI  Diarrhea  -r/o C diff  -started on IV antibiotics-Cefepime  -also started on PO vancomycin  -follow up Stool report for C diff  -keep on Isolation for now  -follow cultures  -follow clinically  -follow repeat lactate  -follow ID consult  -due to Low BP will follow closely in step down unit  -on IVF for now, follow Is and Os    2.  Hypothyroidism  -on levothyroxine    3.  h/o Arrythmia  -on Amiodarone   -stable now   -continue Coumadin for h/o A fib, follow INR daily     4.  DVT ppx: On coumadin 88 yo Male presented with fever, cough and cough.     A/P:    1.  Sepsis  UTI  Diarrhea  -r/o C diff  -started on IV antibiotics-Cefepime  -also started on PO vancomycin  -follow up Stool report for C diff  -keep on Isolation for now  -follow cultures  -follow clinically  -follow repeat lactate  -follow ID consult  -due to Low BP will follow closely in step down unit  -on IVF for now, follow Is and Os    2.  Hypothyroidism  -on levothyroxine    3.  h/o Arrythmia  h/o A fib  -on Amiodarone   -stable now   -continue Coumadin for h/o A fib, follow INR daily     4.  Acute kidney injury on CKD stage 3  -on IVF  -will follow Cr and Is and Os/urinary output     4.  DVT ppx: On coumadin

## 2019-10-09 NOTE — ED PROVIDER NOTE - CROS ED EYES ALL NEG
Refilled, but needs dexa and lab only. Both ordered. Please call patient to schedule.     fosomax walked to pharmacy.     -Bert Solo, PAC   negative...

## 2019-10-09 NOTE — ED PROVIDER NOTE - PROGRESS NOTE DETAILS
patient presented with fever/sob/cough x 1 day. Recent C. diff. Will get labs/CXR, likely admission. 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 Will hold off Abx for possible pneumonia until definitive diagnosis has been made. ~HAVEN Castillo Nicolette MARTINEZ for ED attending Dr. Luciano: 89 y/o male with a PMHx of PNA, C-diff, A-fib, HLD, HTN, Neuropathy, Pacemaker, Gout, PVD, COPD presents to ED c/o fever, cough and SOB since this AM. Pt took tylenol in around 2:30 PM today.Pt appears confused according to family at bedside. No hx of CHF recently. Recently started on a dairy free diet. Improving cough, chills, fever with more frequent soft stools. Pt had decreased breath sounds on exam. Allergies: Penicillins. Cardiologist: Dr. Mikhail Mansfield. PCP: Ceci Nunn. spoke with Dr. Motta. Will admit for UTI/Diarrhea. r/o C. diff. ~HAVEN Castillo Laate note: Full exam performed. Pt awake and alert, heart rrr, lungs dec bs, bilat cerumen, pharynx clear, abd soft pos bs nt ext venous stasis changes with trace edema. Mustapha RAMSEY

## 2019-10-09 NOTE — H&P ADULT - NEUROLOGICAL DETAILS
sensation intact/deep reflexes intact/alert and oriented x 3/cranial nerves intact/normal strength/responds to verbal commands/responds to pain

## 2019-10-09 NOTE — ED PROVIDER NOTE - GASTROINTESTINAL NEGATIVE STATEMENT, MLM
no abdominal pain, no bloating, no constipation, no diarrhea, no nausea and no vomiting. +Loose stools but denies diarrhea.

## 2019-10-09 NOTE — H&P ADULT - NSHPPHYSICALEXAM_GEN_ALL_CORE
Vital Signs Last 24 Hrs  T(C): 38.7 (09 Oct 2019 21:36), Max: 38.7 (09 Oct 2019 21:36)  T(F): 101.7 (09 Oct 2019 21:36), Max: 101.7 (09 Oct 2019 21:36)  HR: 60 (09 Oct 2019 23:01) (60 - 86)  BP: 102/43 (09 Oct 2019 23:01) (80/64 - 118/59)  RR: 18 (09 Oct 2019 23:01) (16 - 19)  SpO2: 100% (09 Oct 2019 23:01) (98% - 100%)

## 2019-10-09 NOTE — H&P ADULT - HISTORY OF PRESENT ILLNESS
89 year old Male with PMHx of AFib, ppm on Amiodarone and Coumadin, HTN, HLD, recent C. diff infection for which he finished 1 month course of PO Antibiotics treatmnt three weeks ago, who presents to ED today with complain of cough, fever and chest congestion x 24 hours. Patient also complain of few loose stools in the last 2 days but not like recent C. diff diarrhea. He also has multiple loose bowel movement since he has arrived in the [facility].  He also has mild dysuria. But no hematuria. TMax 101.6 this morning at home. Patient took Tylenol before arriving to [facility]. He denies any chest pain or any significant shortness of breath. He denies any h/o heart failure before. No other recent travel. No other significant sick contact.

## 2019-10-09 NOTE — H&P ADULT - NSICDXPASTMEDICALHX_GEN_ALL_CORE_FT
PAST MEDICAL HISTORY:  Afib     H/O Clostridium difficile infection     HLD (hyperlipidemia)     HTN (hypertension)     Neuropathy     Pacemaker     Pneumonia     PVD (peripheral vascular disease)

## 2019-10-09 NOTE — ED PROVIDER NOTE - OBJECTIVE STATEMENT
Patient is an 89 year old male with PMHx of AFib, ppm on Amiodarone and Coumadin, HTN, HLD, recent C. diff infection for which he finished 1 month course of PO Abx tx three weeks ago, who presents to ED today c/o SOB, cough, fever and chest congestion x 24 hours. Patient c/o mild loose stools but NOT like recent C. diff diarrhea. TMax 101.6 this morning. Patient took Tylenol before arrival to [facility]. ~HAVEN Castillo

## 2019-10-09 NOTE — ED PROVIDER NOTE - CARE PLAN
Principal Discharge DX:	UTI (urinary tract infection)  Goal:	r/o C. diff  Secondary Diagnosis:	Diarrhea

## 2019-10-09 NOTE — ED ADULT NURSE NOTE - NSIMPLEMENTINTERV_GEN_ALL_ED
Implemented All Fall Risk Interventions:  [place] to call system. Call bell, personal items and telephone within reach. Instruct patient to call for assistance. Room bathroom lighting operational. Non-slip footwear when patient is off stretcher. Physically safe environment: no spills, clutter or unnecessary equipment. Stretcher in lowest position, wheels locked, appropriate side rails in place. Provide visual cue, wrist band, yellow gown, etc. Monitor gait and stability. Monitor for mental status changes and reorient to person, place, and time. Review medications for side effects contributing to fall risk. Reinforce activity limits and safety measures with patient and family.

## 2019-10-09 NOTE — ED PROVIDER NOTE - CLINICAL SUMMARY MEDICAL DECISION MAKING FREE TEXT BOX
Pt with hx pneumonia, recurrent cdiff. here with fever, inc soft stools, cough. Labs, hydration, antibiotics, stool for cdiff, cxr.  Mustapha RAMSEY

## 2019-10-10 LAB
ANION GAP SERPL CALC-SCNC: 5 MMOL/L — SIGNIFICANT CHANGE UP (ref 5–17)
BUN SERPL-MCNC: 29 MG/DL — HIGH (ref 7–23)
C DIFF BY PCR RESULT: DETECTED
C DIFF TOX GENS STL QL NAA+PROBE: SIGNIFICANT CHANGE UP
CALCIUM SERPL-MCNC: 7.7 MG/DL — LOW (ref 8.5–10.1)
CHLORIDE SERPL-SCNC: 110 MMOL/L — HIGH (ref 96–108)
CO2 SERPL-SCNC: 28 MMOL/L — SIGNIFICANT CHANGE UP (ref 22–31)
CREAT SERPL-MCNC: 1.71 MG/DL — HIGH (ref 0.5–1.3)
GLUCOSE SERPL-MCNC: 100 MG/DL — HIGH (ref 70–99)
HCT VFR BLD CALC: 34.8 % — LOW (ref 39–50)
HGB BLD-MCNC: 10.6 G/DL — LOW (ref 13–17)
INR BLD: 2.19 RATIO — HIGH (ref 0.88–1.16)
LACTATE SERPL-SCNC: 2.2 MMOL/L — HIGH (ref 0.7–2)
LACTATE SERPL-SCNC: 2.7 MMOL/L — HIGH (ref 0.7–2)
LEGIONELLA AG UR QL: NEGATIVE — SIGNIFICANT CHANGE UP
MCHC RBC-ENTMCNC: 28.6 PG — SIGNIFICANT CHANGE UP (ref 27–34)
MCHC RBC-ENTMCNC: 30.5 GM/DL — LOW (ref 32–36)
MCV RBC AUTO: 93.8 FL — SIGNIFICANT CHANGE UP (ref 80–100)
PLATELET # BLD AUTO: 145 K/UL — LOW (ref 150–400)
POTASSIUM SERPL-MCNC: 4.8 MMOL/L — SIGNIFICANT CHANGE UP (ref 3.5–5.3)
POTASSIUM SERPL-SCNC: 4.8 MMOL/L — SIGNIFICANT CHANGE UP (ref 3.5–5.3)
PROTHROM AB SERPL-ACNC: 24.9 SEC — HIGH (ref 10–12.9)
RBC # BLD: 3.71 M/UL — LOW (ref 4.2–5.8)
RBC # FLD: 17.7 % — HIGH (ref 10.3–14.5)
SODIUM SERPL-SCNC: 143 MMOL/L — SIGNIFICANT CHANGE UP (ref 135–145)
WBC # BLD: 11.35 K/UL — HIGH (ref 3.8–10.5)
WBC # FLD AUTO: 11.35 K/UL — HIGH (ref 3.8–10.5)

## 2019-10-10 RX ORDER — INFLUENZA VIRUS VACCINE 15; 15; 15; 15 UG/.5ML; UG/.5ML; UG/.5ML; UG/.5ML
0.5 SUSPENSION INTRAMUSCULAR ONCE
Refills: 0 | Status: COMPLETED | OUTPATIENT
Start: 2019-10-10 | End: 2019-10-14

## 2019-10-10 RX ORDER — ALBUTEROL 90 UG/1
2.5 AEROSOL, METERED ORAL
Refills: 0 | Status: DISCONTINUED | OUTPATIENT
Start: 2019-10-10 | End: 2019-10-16

## 2019-10-10 RX ADMIN — Medication 650 MILLIGRAM(S): at 18:12

## 2019-10-10 RX ADMIN — WARFARIN SODIUM 1 MILLIGRAM(S): 2.5 TABLET ORAL at 00:23

## 2019-10-10 RX ADMIN — Medication 300 MILLIGRAM(S): at 12:07

## 2019-10-10 RX ADMIN — Medication 650 MILLIGRAM(S): at 12:07

## 2019-10-10 RX ADMIN — CEFEPIME 1000 MILLIGRAM(S): 1 INJECTION, POWDER, FOR SOLUTION INTRAMUSCULAR; INTRAVENOUS at 12:08

## 2019-10-10 RX ADMIN — Medication 650 MILLIGRAM(S): at 06:22

## 2019-10-10 RX ADMIN — Medication 125 MILLIGRAM(S): at 05:49

## 2019-10-10 RX ADMIN — ATORVASTATIN CALCIUM 20 MILLIGRAM(S): 80 TABLET, FILM COATED ORAL at 22:22

## 2019-10-10 RX ADMIN — Medication 650 MILLIGRAM(S): at 13:00

## 2019-10-10 RX ADMIN — WARFARIN SODIUM 1 MILLIGRAM(S): 2.5 TABLET ORAL at 22:21

## 2019-10-10 RX ADMIN — Medication 125 MICROGRAM(S): at 05:47

## 2019-10-10 RX ADMIN — Medication 125 MILLIGRAM(S): at 23:49

## 2019-10-10 RX ADMIN — Medication 125 MILLIGRAM(S): at 12:08

## 2019-10-10 RX ADMIN — Medication 650 MILLIGRAM(S): at 19:00

## 2019-10-10 RX ADMIN — Medication 125 MILLIGRAM(S): at 17:25

## 2019-10-10 RX ADMIN — ALBUTEROL 2.5 MILLIGRAM(S): 90 AEROSOL, METERED ORAL at 17:53

## 2019-10-10 RX ADMIN — Medication 1 APPLICATION(S): at 05:48

## 2019-10-10 RX ADMIN — Medication 125 MILLIGRAM(S): at 00:54

## 2019-10-10 RX ADMIN — Medication 1 APPLICATION(S): at 17:26

## 2019-10-10 RX ADMIN — Medication 1 TABLET(S): at 12:07

## 2019-10-10 RX ADMIN — Medication 1 MILLIGRAM(S): at 12:07

## 2019-10-10 NOTE — PROGRESS NOTE ADULT - ASSESSMENT
1.	recurrent C. dif colitis.   vancomycin PO.  2.	UTI.  f/u UCx.  c/w cefepime.  3.	AF.  INR therapeutic.  anticoagulation, warfarin.  rhythm control, amiodarone.

## 2019-10-10 NOTE — PROGRESS NOTE ADULT - SUBJECTIVE AND OBJECTIVE BOX
CC:  Patient is a 89y old  Male who presents with a chief complaint of complain of fever and cough (10 Oct 2019 14:30)    SUBJECTIVE:     -no new complaints or issues at current time.    ROS:  all other review of systems are negative unless indicated above.    acetaminophen   Tablet .. 650 milliGRAM(s) Oral every 6 hours PRN  ALBUTerol    0.083% 2.5 milliGRAM(s) Nebulizer every 3 hours PRN  allopurinol 300 milliGRAM(s) Oral daily  amiodarone    Tablet 200 milliGRAM(s) Oral daily  ammonium lactate 12% Lotion 1 Application(s) Topical two times a day  atorvastatin 20 milliGRAM(s) Oral at bedtime  cefepime  Injectable. 1000 milliGRAM(s) IV Push daily  folic acid 1 milliGRAM(s) Oral daily  influenza   Vaccine 0.5 milliLiter(s) IntraMuscular once  levothyroxine 125 MICROGram(s) Oral daily  metoprolol tartrate 25 milliGRAM(s) Oral two times a day  multivitamin 1 Tablet(s) Oral daily  sodium chloride 0.9%. 1000 milliLiter(s) IV Continuous <Continuous>  vancomycin    Solution 125 milliGRAM(s) Oral every 6 hours  warfarin 1 milliGRAM(s) Oral daily    T(C): 36.7 (10-10-19 @ 17:18), Max: 38.7 (10-09-19 @ 21:36)  HR: 60 (10-10-19 @ 17:00) (60 - 97)  BP: 105/60 (10-10-19 @ 17:00) (80/64 - 127/88)  RR: 18 (10-10-19 @ 17:00) (16 - 23)  SpO2: 100% (10-10-19 @ 17:00) (92% - 100%)    Constitutional: NAD.   HEENT: PERRL, EOMI, MMM.  Neck: Soft and supple, No carotid bruit, No JVD  Respiratory: Breath sounds are clear bilaterally, No wheezing, rales or rhonchi  Cardiovascular: S1 and S2, regular rate and rhythm, no murmur, rub or gallop.  Gastrointestinal: Bowel Sounds present, soft, nontender, nondistended, no guarding, no rebound, no mass.  Extremities: No peripheral edema  Vascular: 2+ peripheral pulses  Neurological: A/O x , no focal deficits  Musculoskeletal: 5/5 strength b/l upper and lower extremities  Skin:  no visible rashes.                         10.6   11.35 )-----------( 145      ( 10 Oct 2019 03:26 )             34.8     PT/INR - ( 10 Oct 2019 03:26 )   PT: 24.9 sec;   INR: 2.19 ratio         PTT - ( 09 Oct 2019 18:13 )  PTT:35.6 sec  10-10    143  |  110<H>  |  29<H>  ----------------------------<  100<H>  4.8   |  28  |  1.71<H>    Ca    7.7<L>      10 Oct 2019 03:26    TPro  6.3  /  Alb  3.0<L>  /  TBili  0.9  /  DBili  x   /  AST  45<H>  /  ALT  58  /  AlkPhos  105  10-09

## 2019-10-10 NOTE — CONSULT NOTE ADULT - ASSESSMENT
89 year old Male with PMHx of AFib, ppm on Amiodarone and Coumadin, HTN, HLD, recent C. diff infection for which he finished 1 month course of PO Antibiotics treatment three weeks ago, admitted on 10/9 for evaluation fever, chills, cough and chest congestion of preceding day; also notes loose stools since admission, along with pain on urination. The patient did not have any recent dental work or recent travel. Admission lab work significant for elevated lactate.  1. Patient admitted with sepsis possibly due to CDiff colitis versus urinary origin given abnormal imaging and pyuria and patient complaints  - follow up cultures   - serial cbc and monitor temperature   - reviewed prior medical records to evaluate for resistant or atypical pathogens   - oxygen and nebs as needed   - iv hydration and supportive care   - on cefepime which can continue for now  - will continue vancomycin oral while on cefepime, given history of recent CDiff colitis  - cdiff toxin assay pending  - maintain contact isolation for now  2. other issues; per medicine

## 2019-10-10 NOTE — CONSULT NOTE ADULT - SUBJECTIVE AND OBJECTIVE BOX
Patient is a 89y old  Male who presents with a chief complaint of complain of fever and cough (09 Oct 2019 23:03)    HPI:  89 year old Male with PMHx of AFib, ppm on Amiodarone and Coumadin, HTN, HLD, recent C. diff infection for which he finished 1 month course of PO Antibiotics treatment three weeks ago, admitted on 10/9 for evaluation fever, chills, cough and chest congestion of preceding day; also notes loose stools since admission, along with pain on urination. The patient did not have any recent dental work or recent travel. Admission lab work significant for elevated lactate.            PMH: as above  PSH: as above  Meds: per reconciliation sheet, noted below  MEDICATIONS  (STANDING):  allopurinol 300 milliGRAM(s) Oral daily  amiodarone    Tablet 200 milliGRAM(s) Oral daily  ammonium lactate 12% Lotion 1 Application(s) Topical two times a day  atorvastatin 20 milliGRAM(s) Oral at bedtime  cefepime  Injectable. 1000 milliGRAM(s) IV Push daily  folic acid 1 milliGRAM(s) Oral daily  influenza   Vaccine 0.5 milliLiter(s) IntraMuscular once  levothyroxine 125 MICROGram(s) Oral daily  metoprolol tartrate 25 milliGRAM(s) Oral two times a day  multivitamin 1 Tablet(s) Oral daily  sodium chloride 0.9%. 1000 milliLiter(s) (125 mL/Hr) IV Continuous <Continuous>  vancomycin    Solution 125 milliGRAM(s) Oral every 6 hours  warfarin 1 milliGRAM(s) Oral daily    MEDICATIONS  (PRN):  acetaminophen   Tablet .. 650 milliGRAM(s) Oral every 6 hours PRN Temp greater or equal to 38C (100.4F), Mild Pain (1 - 3)    Allergies    penicillins (Unknown)    Intolerances      Social: no smoking, no alcohol, no illegal drugs; no recent travel, no exposure to TB  FAMILY HISTORY:  FHx: cancer     no history of premature cardiovascular disease in first degree relatives  ROS: the patient denies fever, no chills, no HA, no dizziness, no sore throat, no blurry vision, no CP, no palpitations, no SOB, no cough, no abdominal pain, no diarrhea, no N/V, no dysuria, no leg pain, no claudication, no rash, no joint aches, no rectal pain or bleeding, no night sweats  All other systems reviewed and are negative    Vital Signs Last 24 Hrs  T(C): 37.4 (10 Oct 2019 08:54), Max: 38.7 (09 Oct 2019 21:36)  T(F): 99.4 (10 Oct 2019 08:54), Max: 101.7 (09 Oct 2019 21:36)  HR: 65 (10 Oct 2019 13:00) (60 - 97)  BP: 110/53 (10 Oct 2019 13:00) (80/64 - 127/88)  BP(mean): 70 (10 Oct 2019 13:00) (55 - 100)  RR: 19 (10 Oct 2019 13:00) (16 - 23)  SpO2: 100% (10 Oct 2019 13:00) (92% - 100%)  Daily     Daily Weight in k (10 Oct 2019 01:15)    PE:    Constitutional: frail looking  HEENT: NC/AT, EOMI, PERRLA, conjunctivae clear; ears and nose atraumatic; pharynx clear  Neck: supple; thyroid not palpable  Back: no tenderness, fullness of neck bilaterally, no palpable lymph nodes  Respiratory: respiratory effort normal; clear to auscultation  Cardiovascular: S1S2 regular, no murmurs  Abdomen: soft, not tender, not distended, positive BS; no liver or spleen organomegaly  Genitourinary: no suprapubic tenderness  Musculoskeletal: no muscle tenderness, no joint swelling or tenderness  Neurological/ Psychiatric: AxOx3, judgement and insight normal;  moving all extremities  Skin: no rashes; no palpable lesions    Labs: all available labs reviewed                        10.   11.35 )-----------( 145      ( 10 Oct 2019 03:26 )             34.8     10-10    143  |  110<H>  |  29<H>  ----------------------------<  100<H>  4.8   |  28  |  1.71<H>    Ca    7.7<L>      10 Oct 2019 03:26    TPro  6.3  /  Alb  3.0<L>  /  TBili  0.9  /  DBili  x   /  AST  45<H>  /  ALT  58  /  AlkPhos  105  1009     LIVER FUNCTIONS - ( 09 Oct 2019 18:13 )  Alb: 3.0 g/dL / Pro: 6.3 gm/dL / ALK PHOS: 105 U/L / ALT: 58 U/L / AST: 45 U/L / GGT: x           Urinalysis Basic - ( 09 Oct 2019 18:13 )    Color: Yellow / Appearance: Slightly Turbid / S.015 / pH: x  Gluc: x / Ketone: Trace  / Bili: Negative / Urobili: Negative mg/dL   Blood: x / Protein: 30 mg/dL / Nitrite: Negative   Leuk Esterase: Moderate / RBC: 3-5 /HPF / WBC 26-50   Sq Epi: x / Non Sq Epi: Few / Bacteria: Occasional    Lactate, Blood (10.10.19 @ 06:46)    Lactate, Blood: 2.2 mmol/L                < from: CT Abdomen and Pelvis w/ Oral Cont (19 @ 17:52) >    EXAM:  CT ABDOMEN AND PELVIS OC                            PROCEDURE DATE:  2019          INTERPRETATION:  CLINICAL STATEMENT: diarrhea, recently treated for C.   difficile    TECHNIQUE: CT of the abdomen and pelvis was performed without IV   contrast. Oral contrast was administered.    COMPARISON: 2015    FINDINGS:    The lower chest is remarkable for pacemaker wires. There are minimal   bilateral pleural effusions    The liver is unremarkable. The gallbladder is not identified and may be   surgically absent. Dropped gallstones are suspected in the right abdomen.    The spleen, pancreas and adrenal glands are unremarkable.The kidneys   demonstrate small cysts. Additionally there is an indeterminate exophytic   lesion arising fromthe lateral lower pole of the right kidney which was   present on the prior study and appears similar or smaller consistent with   a benign etiology. The fluid-filled bladder appears intact.    There is no bowel obstruction. There is no CT evidence of appendicitis.   There is severe wall thickening of the colon diffusely which is   compatible with pseudomembranous colitis    There is no intraperitoneal free air.  There is no free fluid. The aorta   is not aneurysmal. There is atherosclerotic calcification of the   abdominal aorta and its branches.    There is no significant abdominal, retroperitoneal or pelvic   lymphadenopathy. The pelvic structures are unremarkable.    The osseous structures demonstrate osteopenia and degenerative changes.   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 There is heterotopic   ossification about the left hip..    IMPRESSION:    Severe diffuse colonic wall thickening may be secondary to C. difficile.   Recommend clinical and laboratory correlation.  Incidentally seen are bilateral renal cysts  Gallbladder has been removed since the prior study. Dropped gallstones   are suspected in the right abdomen    < end of copied text >        Radiology: all available radiological tests reviewed    Advanced directives addressed: full resuscitation

## 2019-10-11 LAB
-  AMIKACIN: SIGNIFICANT CHANGE UP
-  AZTREONAM: SIGNIFICANT CHANGE UP
-  CEFEPIME: SIGNIFICANT CHANGE UP
-  CEFTAZIDIME: SIGNIFICANT CHANGE UP
-  CIPROFLOXACIN: SIGNIFICANT CHANGE UP
-  GENTAMICIN: SIGNIFICANT CHANGE UP
-  IMIPENEM: SIGNIFICANT CHANGE UP
-  LEVOFLOXACIN: SIGNIFICANT CHANGE UP
-  MEROPENEM: SIGNIFICANT CHANGE UP
-  PIPERACILLIN/TAZOBACTAM: SIGNIFICANT CHANGE UP
-  TOBRAMYCIN: SIGNIFICANT CHANGE UP
ANION GAP SERPL CALC-SCNC: 4 MMOL/L — LOW (ref 5–17)
BUN SERPL-MCNC: 26 MG/DL — HIGH (ref 7–23)
CALCIUM SERPL-MCNC: 7.8 MG/DL — LOW (ref 8.5–10.1)
CHLORIDE SERPL-SCNC: 112 MMOL/L — HIGH (ref 96–108)
CO2 SERPL-SCNC: 25 MMOL/L — SIGNIFICANT CHANGE UP (ref 22–31)
CREAT SERPL-MCNC: 1.47 MG/DL — HIGH (ref 0.5–1.3)
CULTURE RESULTS: SIGNIFICANT CHANGE UP
CULTURE RESULTS: SIGNIFICANT CHANGE UP
GLUCOSE SERPL-MCNC: 86 MG/DL — SIGNIFICANT CHANGE UP (ref 70–99)
HCT VFR BLD CALC: 31.8 % — LOW (ref 39–50)
HGB BLD-MCNC: 9.8 G/DL — LOW (ref 13–17)
INR BLD: 2.27 RATIO — HIGH (ref 0.88–1.16)
LACTATE SERPL-SCNC: 1.1 MMOL/L — SIGNIFICANT CHANGE UP (ref 0.7–2)
MCHC RBC-ENTMCNC: 28.4 PG — SIGNIFICANT CHANGE UP (ref 27–34)
MCHC RBC-ENTMCNC: 30.8 GM/DL — LOW (ref 32–36)
MCV RBC AUTO: 92.2 FL — SIGNIFICANT CHANGE UP (ref 80–100)
METHOD TYPE: SIGNIFICANT CHANGE UP
ORGANISM # SPEC MICROSCOPIC CNT: SIGNIFICANT CHANGE UP
ORGANISM # SPEC MICROSCOPIC CNT: SIGNIFICANT CHANGE UP
PLATELET # BLD AUTO: 127 K/UL — LOW (ref 150–400)
POTASSIUM SERPL-MCNC: 3.7 MMOL/L — SIGNIFICANT CHANGE UP (ref 3.5–5.3)
POTASSIUM SERPL-SCNC: 3.7 MMOL/L — SIGNIFICANT CHANGE UP (ref 3.5–5.3)
PROTHROM AB SERPL-ACNC: 25.8 SEC — HIGH (ref 10–12.9)
RBC # BLD: 3.45 M/UL — LOW (ref 4.2–5.8)
RBC # FLD: 17.5 % — HIGH (ref 10.3–14.5)
SODIUM SERPL-SCNC: 141 MMOL/L — SIGNIFICANT CHANGE UP (ref 135–145)
SPECIMEN SOURCE: SIGNIFICANT CHANGE UP
SPECIMEN SOURCE: SIGNIFICANT CHANGE UP
WBC # BLD: 8.78 K/UL — SIGNIFICANT CHANGE UP (ref 3.8–10.5)
WBC # FLD AUTO: 8.78 K/UL — SIGNIFICANT CHANGE UP (ref 3.8–10.5)

## 2019-10-11 RX ORDER — AZTREONAM 2 G
1000 VIAL (EA) INJECTION EVERY 12 HOURS
Refills: 0 | Status: DISCONTINUED | OUTPATIENT
Start: 2019-10-11 | End: 2019-10-13

## 2019-10-11 RX ADMIN — Medication 1 MILLIGRAM(S): at 11:05

## 2019-10-11 RX ADMIN — Medication 125 MILLIGRAM(S): at 17:36

## 2019-10-11 RX ADMIN — Medication 1 APPLICATION(S): at 05:18

## 2019-10-11 RX ADMIN — AMIODARONE HYDROCHLORIDE 200 MILLIGRAM(S): 400 TABLET ORAL at 05:17

## 2019-10-11 RX ADMIN — Medication 1 TABLET(S): at 11:05

## 2019-10-11 RX ADMIN — Medication 125 MICROGRAM(S): at 05:17

## 2019-10-11 RX ADMIN — Medication 1 APPLICATION(S): at 17:36

## 2019-10-11 RX ADMIN — ATORVASTATIN CALCIUM 20 MILLIGRAM(S): 80 TABLET, FILM COATED ORAL at 23:16

## 2019-10-11 RX ADMIN — WARFARIN SODIUM 1 MILLIGRAM(S): 2.5 TABLET ORAL at 23:17

## 2019-10-11 RX ADMIN — Medication 50 MILLIGRAM(S): at 17:37

## 2019-10-11 RX ADMIN — ALBUTEROL 2.5 MILLIGRAM(S): 90 AEROSOL, METERED ORAL at 10:17

## 2019-10-11 RX ADMIN — Medication 125 MILLIGRAM(S): at 05:17

## 2019-10-11 RX ADMIN — Medication 300 MILLIGRAM(S): at 11:05

## 2019-10-11 RX ADMIN — Medication 125 MILLIGRAM(S): at 23:17

## 2019-10-11 RX ADMIN — Medication 125 MILLIGRAM(S): at 11:05

## 2019-10-11 NOTE — PROGRESS NOTE ADULT - SUBJECTIVE AND OBJECTIVE BOX
Patient is a 89y old  Male who presents with a chief complaint of complain of fever and cough (09 Oct 2019 23:03)    Date of service: 10-11-19 @ 12:16      Patient complaining of itchy rash on back of neck      ROS: no fever or chills; denies dizziness, no HA, no SOB or cough, no abdominal pain, no diarrhea or constipation; no dysuria, no urinary frequency, no legs pain    MEDICATIONS  (STANDING):  allopurinol 300 milliGRAM(s) Oral daily  amiodarone    Tablet 200 milliGRAM(s) Oral daily  ammonium lactate 12% Lotion 1 Application(s) Topical two times a day  atorvastatin 20 milliGRAM(s) Oral at bedtime  cefepime  Injectable. 1000 milliGRAM(s) IV Push daily  folic acid 1 milliGRAM(s) Oral daily  influenza   Vaccine 0.5 milliLiter(s) IntraMuscular once  levothyroxine 125 MICROGram(s) Oral daily  metoprolol tartrate 25 milliGRAM(s) Oral two times a day  multivitamin 1 Tablet(s) Oral daily  vancomycin    Solution 125 milliGRAM(s) Oral every 6 hours  warfarin 1 milliGRAM(s) Oral daily    MEDICATIONS  (PRN):  acetaminophen   Tablet .. 650 milliGRAM(s) Oral every 6 hours PRN Temp greater or equal to 38C (100.4F), Mild Pain (1 - 3)  ALBUTerol    0.083% 2.5 milliGRAM(s) Nebulizer every 3 hours PRN Shortness of Breath and/or Wheezing      Vital Signs Last 24 Hrs  T(C): 36.7 (11 Oct 2019 08:40), Max: 38.1 (10 Oct 2019 17:54)  T(F): 98.1 (11 Oct 2019 08:40), Max: 100.6 (10 Oct 2019 17:54)  HR: 78 (11 Oct 2019 11:00) (60 - 78)  BP: 91/51 (11 Oct 2019 11:00) (90/50 - 122/53)  BP(mean): 63 (11 Oct 2019 11:00) (59 - 87)  RR: 18 (11 Oct 2019 11:00) (15 - 20)  SpO2: 99% (11 Oct 2019 11:00) (97% - 100%)    Physical Exam:    PE:    Constitutional: frail looking  HEENT: NC/AT, EOMI, PERRLA, conjunctivae clear; ears and nose atraumatic; pharynx clear  Neck: supple; thyroid not palpable  Back: no tenderness, fullness of neck bilaterally, no palpable lymph nodes  Respiratory: respiratory effort normal; clear to auscultation  Cardiovascular: S1S2 regular, no murmurs  Abdomen: soft, not tender, not distended, positive BS; no liver or spleen organomegaly  Genitourinary: no suprapubic tenderness  Musculoskeletal: no muscle tenderness, no joint swelling or tenderness  Neurological/ Psychiatric: AxOx3, judgement and insight normal;  moving all extremities  Skin: blush on upper back and neck and along bilateral flanks    Labs: all available labs reviewed                       Labs:                        9.8    8.78  )-----------( 127      ( 11 Oct 2019 06:50 )             31.8     10-11    141  |  112<H>  |  26<H>  ----------------------------<  86  3.7   |  25  |  1.47<H>    Ca    7.8<L>      11 Oct 2019 06:50    TPro  6.3  /  Alb  3.0<L>  /  TBili  0.9  /  DBili  x   /  AST  45<H>  /  ALT  58  /  AlkPhos  105  10-09           Cultures:       Culture - Stool (collected 10-09-19 @ 18:14)  Source: .Stool None  Preliminary Report (10-10-19 @ 21:18):    No enteric pathogens to date: Final culture pending    Culture - Urine (collected 10-09-19 @ 18:13)  Source: .Urine None  Preliminary Report (10-10-19 @ 20:58):    >100,000 CFU/ml Gram Negative Rods    Culture - Blood (collected 10-09-19 @ 18:13)  Source: .Blood None  Preliminary Report (10-10-19 @ 23:01):    No growth to date.    Culture - Blood (collected 10-09-19 @ 18:13)  Source: .Blood None  Preliminary Report (10-10-19 @ 23:01):    No growth to date.                  Clostridium difficile Toxin by PCR (10.09.19 @ 18:14)    Clostridium difficile Toxin by PCR: The results of this test should be interpreted with consideration of all  clinical and laboratory findings. This test determines the presence of  the C. difficile tcdB gene at a given time and is not intended to  identify antibiotic associated disease or C. difficile infection without  clinical context.  Successful treatment is based on the resolution of clinical symptoms.  This test should not be used as a "test of cure" because C. difficile DNA  will persist after successful treatment. Repeat testing will not be  permitted.    This test is performed on the BD MAX system using Real-Time PCR and  fluorogenic target-specific hybridization.    C Diff by PCR Result: Detected    < from: CT Abdomen and Pelvis w/ Oral Cont (06.25.19 @ 17:52) >    EXAM:  CT ABDOMEN AND PELVIS OC                            PROCEDURE DATE:  06/25/2019          INTERPRETATION:  CLINICAL STATEMENT: diarrhea, recently treated for C.   difficile    TECHNIQUE: CT of the abdomen and pelvis was performed without IV   contrast. Oral contrast was administered.    COMPARISON: 8/6/2015    FINDINGS:    The lower chest is remarkable for pacemaker wires. There are minimal   bilateral pleural effusions    The liver is unremarkable. The gallbladder is not identified and may be   surgically absent. Dropped gallstones are suspected in the right abdomen.    The spleen, pancreas and adrenal glands are unremarkable.The kidneys   demonstrate small cysts. Additionally there is an indeterminate exophytic   lesion arising fromthe lateral lower pole of the right kidney which was   present on the prior study and appears similar or smaller consistent with   a benign etiology. The fluid-filled bladder appears intact.    There is no bowel obstruction. There is no CT evidence of appendicitis.   There is severe wall thickening of the colon diffusely which is   compatible with pseudomembranous colitis    There is no intraperitoneal free air.  There is no free fluid. The aorta   is not aneurysmal. There is atherosclerotic calcification of the   abdominal aorta and its branches.    There is no significant abdominal, retroperitoneal or pelvic   lymphadenopathy. The pelvic structures are unremarkable.    The osseous structures demonstrate osteopenia and degenerative changes.   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 There is heterotopic   ossification about the left hip..    IMPRESSION:    Severe diffuse colonic wall thickening may be secondary to C. difficile.   Recommend clinical and laboratory correlation.  Incidentally seen are bilateral renal cysts  Gallbladder has been removed since the prior study. Dropped gallstones   are suspected in the right abdomen    < end of copied text >        Radiology: all available radiological tests reviewed    Advanced directives addressed: full resuscitation

## 2019-10-11 NOTE — PROGRESS NOTE ADULT - SUBJECTIVE AND OBJECTIVE BOX
CC:  Patient is a 89y old  Male who presents with a chief complaint of complain of fever and cough (10 Oct 2019 17:18)    SUBJECTIVE:     -2 BMs today, more formed.  -breathing comfortably.    ROS:  all other review of systems are negative unless indicated above.    acetaminophen   Tablet .. 650 milliGRAM(s) Oral every 6 hours PRN  ALBUTerol    0.083% 2.5 milliGRAM(s) Nebulizer every 3 hours PRN  allopurinol 300 milliGRAM(s) Oral daily  amiodarone    Tablet 200 milliGRAM(s) Oral daily  ammonium lactate 12% Lotion 1 Application(s) Topical two times a day  atorvastatin 20 milliGRAM(s) Oral at bedtime  cefepime  Injectable. 1000 milliGRAM(s) IV Push daily  folic acid 1 milliGRAM(s) Oral daily  influenza   Vaccine 0.5 milliLiter(s) IntraMuscular once  levothyroxine 125 MICROGram(s) Oral daily  metoprolol tartrate 25 milliGRAM(s) Oral two times a day  multivitamin 1 Tablet(s) Oral daily  vancomycin    Solution 125 milliGRAM(s) Oral every 6 hours  warfarin 1 milliGRAM(s) Oral daily    T(C): 36.7 (10-11-19 @ 08:40), Max: 38.1 (10-10-19 @ 17:54)  HR: 78 (10-11-19 @ 11:00) (60 - 93)  BP: 91/51 (10-11-19 @ 11:00) (90/50 - 127/88)  RR: 18 (10-11-19 @ 11:00) (15 - 20)  SpO2: 99% (10-11-19 @ 11:00) (97% - 100%)    Constitutional: NAD.   HEENT: PERRL, EOMI, MMM.  Neck: Soft and supple, No carotid bruit, No JVD  Respiratory: Breath sounds are clear bilaterally, No wheezing, rales or rhonchi  Cardiovascular: S1 and S2, regular rate and rhythm, no murmur, rub or gallop.  Gastrointestinal: Bowel Sounds present, soft, nontender, nondistended, no guarding, no rebound, no mass.  Extremities: No peripheral edema  Vascular: 2+ peripheral pulses  Neurological: A/O x 3, no focal deficits  Musculoskeletal: 5/5 strength b/l upper and lower extremities  Skin:  no visible rashes.                         9.8    8.78  )-----------( 127      ( 11 Oct 2019 06:50 )             31.8     PT/INR - ( 11 Oct 2019 06:50 )   PT: 25.8 sec;   INR: 2.27 ratio         PTT - ( 09 Oct 2019 18:13 )  PTT:35.6 sec  10-11    141  |  112<H>  |  26<H>  ----------------------------<  86  3.7   |  25  |  1.47<H>    Ca    7.8<L>      11 Oct 2019 06:50    TPro  6.3  /  Alb  3.0<L>  /  TBili  0.9  /  DBili  x   /  AST  45<H>  /  ALT  58  /  AlkPhos  105  10-09

## 2019-10-11 NOTE — PROVIDER CONTACT NOTE (OTHER) - SITUATION
Spoke with Nohemi in the office to inform  that patient is in [facility].  Please fax discharge papers to [phone]

## 2019-10-11 NOTE — PROVIDER CONTACT NOTE (OTHER) - SITUATION
Spoke with office to inform dr that patient is in [facility].  Please fax discharge papers to [phone]

## 2019-10-11 NOTE — PROGRESS NOTE ADULT - ASSESSMENT
89 year old Male with PMHx of AFib, ppm on Amiodarone and Coumadin, HTN, HLD, recent C. diff infection for which he finished 1 month course of PO Antibiotics treatment three weeks ago, admitted on 10/9 for evaluation fever, chills, cough and chest congestion of preceding day; also notes loose stools since admission, along with pain on urination. The patient did not have any recent dental work or recent travel. Admission lab work significant for elevated lactate.  1. Patient admitted with sepsis possibly due to CDiff colitis versus urinary origin given abnormal imaging and pyuria and patient complaints  - follow up cultures ---- found to have gram negative rods  - serial cbc and monitor temperature   - reviewed prior medical records to evaluate for resistant or atypical pathogens   - oxygen and nebs as needed   - iv hydration and supportive care   - day #2 po vancomycin for Cdiff  - maintain contact isolation for now  - given rash will stop cefepime and start aztreonam pending identification of gram negative rods  2. other issues; per medicine

## 2019-10-11 NOTE — PROGRESS NOTE ADULT - ASSESSMENT
1.	recurrent C. dif colitis.   vancomycin PO.  2.	UTI.  UCx (+) 100K GNR.  c/w cefepime.  3.	AF.  INR therapeutic.  anticoagulation, warfarin.  rhythm control, amiodarone.

## 2019-10-12 LAB
ANION GAP SERPL CALC-SCNC: 7 MMOL/L — SIGNIFICANT CHANGE UP (ref 5–17)
BUN SERPL-MCNC: 30 MG/DL — HIGH (ref 7–23)
CALCIUM SERPL-MCNC: 8.1 MG/DL — LOW (ref 8.5–10.1)
CHLORIDE SERPL-SCNC: 113 MMOL/L — HIGH (ref 96–108)
CO2 SERPL-SCNC: 24 MMOL/L — SIGNIFICANT CHANGE UP (ref 22–31)
CREAT SERPL-MCNC: 1.52 MG/DL — HIGH (ref 0.5–1.3)
GLUCOSE SERPL-MCNC: 111 MG/DL — HIGH (ref 70–99)
HCT VFR BLD CALC: 29.7 % — LOW (ref 39–50)
HGB BLD-MCNC: 9.2 G/DL — LOW (ref 13–17)
INR BLD: 1.85 RATIO — HIGH (ref 0.88–1.16)
MCHC RBC-ENTMCNC: 28.2 PG — SIGNIFICANT CHANGE UP (ref 27–34)
MCHC RBC-ENTMCNC: 31 GM/DL — LOW (ref 32–36)
MCV RBC AUTO: 91.1 FL — SIGNIFICANT CHANGE UP (ref 80–100)
PLATELET # BLD AUTO: 144 K/UL — LOW (ref 150–400)
POTASSIUM SERPL-MCNC: 3.6 MMOL/L — SIGNIFICANT CHANGE UP (ref 3.5–5.3)
POTASSIUM SERPL-SCNC: 3.6 MMOL/L — SIGNIFICANT CHANGE UP (ref 3.5–5.3)
PROTHROM AB SERPL-ACNC: 20.9 SEC — HIGH (ref 10–12.9)
RBC # BLD: 3.26 M/UL — LOW (ref 4.2–5.8)
RBC # FLD: 17.7 % — HIGH (ref 10.3–14.5)
SODIUM SERPL-SCNC: 144 MMOL/L — SIGNIFICANT CHANGE UP (ref 135–145)
WBC # BLD: 7.25 K/UL — SIGNIFICANT CHANGE UP (ref 3.8–10.5)
WBC # FLD AUTO: 7.25 K/UL — SIGNIFICANT CHANGE UP (ref 3.8–10.5)

## 2019-10-12 RX ORDER — WARFARIN SODIUM 2.5 MG/1
2 TABLET ORAL ONCE
Refills: 0 | Status: COMPLETED | OUTPATIENT
Start: 2019-10-12 | End: 2019-10-12

## 2019-10-12 RX ADMIN — Medication 50 MILLIGRAM(S): at 05:29

## 2019-10-12 RX ADMIN — Medication 125 MILLIGRAM(S): at 12:02

## 2019-10-12 RX ADMIN — Medication 50 MILLIGRAM(S): at 17:30

## 2019-10-12 RX ADMIN — AMIODARONE HYDROCHLORIDE 200 MILLIGRAM(S): 400 TABLET ORAL at 05:28

## 2019-10-12 RX ADMIN — Medication 125 MICROGRAM(S): at 05:28

## 2019-10-12 RX ADMIN — ALBUTEROL 2.5 MILLIGRAM(S): 90 AEROSOL, METERED ORAL at 08:24

## 2019-10-12 RX ADMIN — ATORVASTATIN CALCIUM 20 MILLIGRAM(S): 80 TABLET, FILM COATED ORAL at 21:09

## 2019-10-12 RX ADMIN — Medication 125 MILLIGRAM(S): at 05:28

## 2019-10-12 RX ADMIN — WARFARIN SODIUM 1 MILLIGRAM(S): 2.5 TABLET ORAL at 21:09

## 2019-10-12 RX ADMIN — Medication 125 MILLIGRAM(S): at 17:30

## 2019-10-12 RX ADMIN — WARFARIN SODIUM 2 MILLIGRAM(S): 2.5 TABLET ORAL at 21:09

## 2019-10-12 RX ADMIN — Medication 1 APPLICATION(S): at 17:31

## 2019-10-12 RX ADMIN — Medication 125 MILLIGRAM(S): at 23:26

## 2019-10-12 RX ADMIN — Medication 1 TABLET(S): at 12:02

## 2019-10-12 RX ADMIN — Medication 1 APPLICATION(S): at 05:29

## 2019-10-12 RX ADMIN — Medication 1 MILLIGRAM(S): at 12:02

## 2019-10-12 RX ADMIN — Medication 25 MILLIGRAM(S): at 05:28

## 2019-10-12 RX ADMIN — Medication 300 MILLIGRAM(S): at 12:02

## 2019-10-12 NOTE — PROGRESS NOTE ADULT - SUBJECTIVE AND OBJECTIVE BOX
CC:  Patient is a 89y old  Male who presents with a chief complaint of complain of fever and cough (10 Oct 2019 17:18)    SUBJECTIVE:     -tolerating food trays.  -BM becoming more infrequent and formed.  -breathing comfortably.    ROS:  all other review of systems are negative unless indicated above.    acetaminophen   Tablet .. 650 milliGRAM(s) Oral every 6 hours PRN  ALBUTerol    0.083% 2.5 milliGRAM(s) Nebulizer every 3 hours PRN  allopurinol 300 milliGRAM(s) Oral daily  amiodarone    Tablet 200 milliGRAM(s) Oral daily  ammonium lactate 12% Lotion 1 Application(s) Topical two times a day  atorvastatin 20 milliGRAM(s) Oral at bedtime  cefepime  Injectable. 1000 milliGRAM(s) IV Push daily  folic acid 1 milliGRAM(s) Oral daily  influenza   Vaccine 0.5 milliLiter(s) IntraMuscular once  levothyroxine 125 MICROGram(s) Oral daily  metoprolol tartrate 25 milliGRAM(s) Oral two times a day  multivitamin 1 Tablet(s) Oral daily  vancomycin    Solution 125 milliGRAM(s) Oral every 6 hours  warfarin 1 milliGRAM(s) Oral daily    T(C): 36.7 (10-11-19 @ 08:40), Max: 38.1 (10-10-19 @ 17:54)  HR: 78 (10-11-19 @ 11:00) (60 - 93)  BP: 91/51 (10-11-19 @ 11:00) (90/50 - 127/88)  RR: 18 (10-11-19 @ 11:00) (15 - 20)  SpO2: 99% (10-11-19 @ 11:00) (97% - 100%)    Constitutional: NAD.   HEENT: PERRL, EOMI, MMM.  Neck: Soft and supple, No carotid bruit, No JVD  Respiratory: Breath sounds are clear bilaterally, No wheezing, rales or rhonchi  Cardiovascular: S1 and S2, regular rate and rhythm, no murmur, rub or gallop.  Gastrointestinal: Bowel Sounds present, soft, nontender, nondistended, no guarding, no rebound, no mass.  Extremities: No peripheral edema  Vascular: 2+ peripheral pulses  Neurological: A/O x 3, no focal deficits  Musculoskeletal: 5/5 strength b/l upper and lower extremities  Skin:  no visible rashes.                         9.8    8.78  )-----------( 127      ( 11 Oct 2019 06:50 )             31.8     PT/INR - ( 11 Oct 2019 06:50 )   PT: 25.8 sec;   INR: 2.27 ratio         PTT - ( 09 Oct 2019 18:13 )  PTT:35.6 sec  10-11    141  |  112<H>  |  26<H>  ----------------------------<  86  3.7   |  25  |  1.47<H>    Ca    7.8<L>      11 Oct 2019 06:50    TPro  6.3  /  Alb  3.0<L>  /  TBili  0.9  /  DBili  x   /  AST  45<H>  /  ALT  58  /  AlkPhos  105  10-09

## 2019-10-12 NOTE — PHYSICAL THERAPY INITIAL EVALUATION ADULT - PERTINENT HX OF CURRENT PROBLEM, REHAB EVAL
Pt was admitted on 10/9 for evaluation of fever, chills, cough and chest congestion of preceding day; also notes loose stools since admission, along with pain on urination., recent C. diff infection for which he finished 1 month course of PO Antibiotics treatment three weeks ago,, Patient admitted with sepsis possibly due to CDiff colitis versus urinary origin given abnormal imaging and pyuria

## 2019-10-12 NOTE — PROGRESS NOTE ADULT - SUBJECTIVE AND OBJECTIVE BOX
Patient is a 89y old  Male who presents with a chief complaint of complain of fever and cough (09 Oct 2019 23:03)    Date of service: 10-12-19 @ 08:41    Patient lying in bed; no complaints, diarrhea is resolved        ROS: no fever or chills; denies dizziness, no HA, no SOB or cough, no abdominal pain, no diarrhea or constipation; no dysuria, no urinary frequency, no legs pain, no rashes    MEDICATIONS  (STANDING):  allopurinol 300 milliGRAM(s) Oral daily  amiodarone    Tablet 200 milliGRAM(s) Oral daily  ammonium lactate 12% Lotion 1 Application(s) Topical two times a day  atorvastatin 20 milliGRAM(s) Oral at bedtime  aztreonam  IVPB 1000 milliGRAM(s) IV Intermittent every 12 hours  folic acid 1 milliGRAM(s) Oral daily  influenza   Vaccine 0.5 milliLiter(s) IntraMuscular once  levothyroxine 125 MICROGram(s) Oral daily  metoprolol tartrate 25 milliGRAM(s) Oral two times a day  multivitamin 1 Tablet(s) Oral daily  vancomycin    Solution 125 milliGRAM(s) Oral every 6 hours  warfarin 1 milliGRAM(s) Oral daily    MEDICATIONS  (PRN):  acetaminophen   Tablet .. 650 milliGRAM(s) Oral every 6 hours PRN Temp greater or equal to 38C (100.4F), Mild Pain (1 - 3)  ALBUTerol    0.083% 2.5 milliGRAM(s) Nebulizer every 3 hours PRN Shortness of Breath and/or Wheezing      Vital Signs Last 24 Hrs  T(C): 36.8 (12 Oct 2019 06:00), Max: 37.4 (11 Oct 2019 16:37)  T(F): 98.2 (12 Oct 2019 06:00), Max: 99.4 (11 Oct 2019 16:37)  HR: 60 (12 Oct 2019 08:00) (60 - 92)  BP: 110/54 (12 Oct 2019 08:00) (90/50 - 141/57)  BP(mean): 69 (12 Oct 2019 08:00) (62 - 106)  RR: 19 (12 Oct 2019 08:00) (14 - 24)  SpO2: 98% (12 Oct 2019 08:00) (93% - 100%)    Physical Exam:          PE:    Constitutional: frail looking  HEENT: NC/AT, EOMI, PERRLA, conjunctivae clear; ears and nose atraumatic; pharynx clear  Neck: supple; thyroid not palpable  Back: no tenderness, fullness of neck bilaterally, no palpable lymph nodes  Respiratory: respiratory effort normal; clear to auscultation  Cardiovascular: S1S2 regular, no murmurs  Abdomen: soft, not tender, not distended, positive BS; no liver or spleen organomegaly  Genitourinary: no suprapubic tenderness  Musculoskeletal: no muscle tenderness, no joint swelling or tenderness  Neurological/ Psychiatric: AxOx3, judgement and insight normal;  moving all extremities  Skin: blush on upper back and neck and along bilateral flanks resolving    Labs: all available labs reviewed                       Labs:                        9.2    7.25  )-----------( 144      ( 12 Oct 2019 07:07 )             29.7     10-12    144  |  113<H>  |  30<H>  ----------------------------<  111<H>  3.6   |  24  |  1.52<H>    Ca    8.1<L>      12 Oct 2019 07:07             Cultures:       Culture - Stool (collected 10-09-19 @ 18:14)  Source: .Stool None  Final Report (10-11-19 @ 21:44):    No enteric pathogens isolated.    (Stool culture examined for Salmonella,    Shigella, Campylobacter, Aeromonas, Plesiomonas,    Vibrio, E.coli O157 and Yersinia)    Culture - Urine (collected 10-09-19 @ 18:13)  Source: .Urine None  Final Report (10-11-19 @ 21:09):    >100,000 CFU/ml Pseudomonas aeruginosa  Organism: Pseudomonas aeruginosa (10-11-19 @ 21:09)  Organism: Pseudomonas aeruginosa (10-11-19 @ 21:09)      -  Amikacin: S <=16      -  Aztreonam: S 8      -  Cefepime: S 8      -  Ceftazidime: S 4      -  Ciprofloxacin: S <=1      -  Gentamicin: I 8      -  Imipenem: S 2      -  Levofloxacin: S <=2      -  Meropenem: S <=1      -  Piperacillin/Tazobactam: S <=16      -  Tobramycin: S <=4      Method Type: DARIN    Culture - Blood (collected 10-09-19 @ 18:13)  Source: .Blood None  Preliminary Report (10-10-19 @ 23:01):    No growth to date.    Culture - Blood (collected 10-09-19 @ 18:13)  Source: .Blood None  Preliminary Report (10-10-19 @ 23:01):    No growth to date.                  Clostridium difficile Toxin by PCR (10.09.19 @ 18:14)    Clostridium difficile Toxin by PCR: The results of this test should be interpreted with consideration of all  clinical and laboratory findings. This test determines the presence of  the C. difficile tcdB gene at a given time and is not intended to  identify antibiotic associated disease or C. difficile infection without  clinical context.  Successful treatment is based on the resolution of clinical symptoms.  This test should not be used as a "test of cure" because C. difficile DNA  will persist after successful treatment. Repeat testing will not be  permitted.    This test is performed on the BD MAX system using Real-Time PCR and  fluorogenic target-specific hybridization.    C Diff by PCR Result: Detected    < from: CT Abdomen and Pelvis w/ Oral Cont (06.25.19 @ 17:52) >    EXAM:  CT ABDOMEN AND PELVIS OC                            PROCEDURE DATE:  06/25/2019          INTERPRETATION:  CLINICAL STATEMENT: diarrhea, recently treated for C.   difficile    TECHNIQUE: CT of the abdomen and pelvis was performed without IV   contrast. Oral contrast was administered.    COMPARISON: 8/6/2015    FINDINGS:    The lower chest is remarkable for pacemaker wires. There are minimal   bilateral pleural effusions    The liver is unremarkable. The gallbladder is not identified and may be   surgically absent. Dropped gallstones are suspected in the right abdomen.    The spleen, pancreas and adrenal glands are unremarkable.The kidneys   demonstrate small cysts. Additionally there is an indeterminate exophytic   lesion arising fromthe lateral lower pole of the right kidney which was   present on the prior study and appears similar or smaller consistent with   a benign etiology. The fluid-filled bladder appears intact.    There is no bowel obstruction. There is no CT evidence of appendicitis.   There is severe wall thickening of the colon diffusely which is   compatible with pseudomembranous colitis    There is no intraperitoneal free air.  There is no free fluid. The aorta   is not aneurysmal. There is atherosclerotic calcification of the   abdominal aorta and its branches.    There is no significant abdominal, retroperitoneal or pelvic   lymphadenopathy. The pelvic structures are unremarkable.    The osseous structures demonstrate osteopenia and degenerative changes.   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 There is heterotopic   ossification about the left hip..    IMPRESSION:    Severe diffuse colonic wall thickening may be secondary to C. difficile.   Recommend clinical and laboratory correlation.  Incidentally seen are bilateral renal cysts  Gallbladder has been removed since the prior study. Dropped gallstones   are suspected in the right abdomen    < end of copied text >        Radiology: all available radiological tests reviewed    Advanced directives addressed: full resuscitation

## 2019-10-12 NOTE — PHYSICAL THERAPY INITIAL EVALUATION ADULT - CRITERIA FOR SKILLED THERAPEUTIC INTERVENTIONS
rehab potential/impairments found/therapy frequency/predicted duration of therapy intervention/functional limitations in following categories/risk reduction/prevention/anticipated equipment needs at discharge/anticipated discharge recommendation

## 2019-10-12 NOTE — PROGRESS NOTE ADULT - ASSESSMENT
89 year old Male with PMHx of AFib, ppm on Amiodarone and Coumadin, HTN, HLD, recent C. diff infection for which he finished 1 month course of PO Antibiotics treatment three weeks ago, admitted on 10/9 for evaluation fever, chills, cough and chest congestion of preceding day; also notes loose stools since admission, along with pain on urination. The patient did not have any recent dental work or recent travel. Admission lab work significant for elevated lactate.  1. Patient admitted with sepsis possibly due to CDiff colitis versus urinary origin given abnormal imaging and pyuria and patient complaints  - follow up cultures ---- found to have gram negative rods which is Pseudomonas aeruginosa; most likely causing the admission for sepsis of urinary origin  - serial cbc and monitor temperature   - reviewed prior medical records to evaluate for resistant or atypical pathogens   - oxygen and nebs as needed   - iv hydration and supportive care   - day #3 po vancomycin for Cdiff  - maintain contact isolation for now  - aztreonam day #2   - tolerating antibiotics without rashes or side effects   2. other issues; per medicine

## 2019-10-12 NOTE — PROGRESS NOTE ADULT - ASSESSMENT
1.	recurrent C. dif colitis.   vancomycin PO.  2.	UTI.  UCx (+) 100K GNR.  ? rxn cefepime.  ABx changed to aztreonam.  3.	AF.  INR subtherapeutic.  warfarin 3mg po tonight.  rhythm control, amiodarone.

## 2019-10-13 LAB
ANION GAP SERPL CALC-SCNC: 6 MMOL/L — SIGNIFICANT CHANGE UP (ref 5–17)
BUN SERPL-MCNC: 31 MG/DL — HIGH (ref 7–23)
CALCIUM SERPL-MCNC: 7.9 MG/DL — LOW (ref 8.5–10.1)
CHLORIDE SERPL-SCNC: 113 MMOL/L — HIGH (ref 96–108)
CO2 SERPL-SCNC: 20 MMOL/L — LOW (ref 22–31)
CREAT SERPL-MCNC: 1.31 MG/DL — HIGH (ref 0.5–1.3)
GLUCOSE SERPL-MCNC: 95 MG/DL — SIGNIFICANT CHANGE UP (ref 70–99)
HCT VFR BLD CALC: 30.2 % — LOW (ref 39–50)
HGB BLD-MCNC: 9.4 G/DL — LOW (ref 13–17)
INR BLD: 1.62 RATIO — HIGH (ref 0.88–1.16)
MCHC RBC-ENTMCNC: 28.7 PG — SIGNIFICANT CHANGE UP (ref 27–34)
MCHC RBC-ENTMCNC: 31.1 GM/DL — LOW (ref 32–36)
MCV RBC AUTO: 92.1 FL — SIGNIFICANT CHANGE UP (ref 80–100)
PLATELET # BLD AUTO: 154 K/UL — SIGNIFICANT CHANGE UP (ref 150–400)
POTASSIUM SERPL-MCNC: 4.1 MMOL/L — SIGNIFICANT CHANGE UP (ref 3.5–5.3)
POTASSIUM SERPL-SCNC: 4.1 MMOL/L — SIGNIFICANT CHANGE UP (ref 3.5–5.3)
PROTHROM AB SERPL-ACNC: 18.2 SEC — HIGH (ref 10–12.9)
RBC # BLD: 3.28 M/UL — LOW (ref 4.2–5.8)
RBC # FLD: 18 % — HIGH (ref 10.3–14.5)
SODIUM SERPL-SCNC: 139 MMOL/L — SIGNIFICANT CHANGE UP (ref 135–145)
WBC # BLD: 6.91 K/UL — SIGNIFICANT CHANGE UP (ref 3.8–10.5)
WBC # FLD AUTO: 6.91 K/UL — SIGNIFICANT CHANGE UP (ref 3.8–10.5)

## 2019-10-13 RX ORDER — WARFARIN SODIUM 2.5 MG/1
1 TABLET ORAL DAILY
Refills: 0 | Status: DISCONTINUED | OUTPATIENT
Start: 2019-10-14 | End: 2019-10-14

## 2019-10-13 RX ORDER — FUROSEMIDE 40 MG
20 TABLET ORAL DAILY
Refills: 0 | Status: DISCONTINUED | OUTPATIENT
Start: 2019-10-13 | End: 2019-10-16

## 2019-10-13 RX ORDER — WARFARIN SODIUM 2.5 MG/1
2.5 TABLET ORAL ONCE
Refills: 0 | Status: COMPLETED | OUTPATIENT
Start: 2019-10-13 | End: 2019-10-13

## 2019-10-13 RX ORDER — WARFARIN SODIUM 2.5 MG/1
5 TABLET ORAL ONCE
Refills: 0 | Status: DISCONTINUED | OUTPATIENT
Start: 2019-10-13 | End: 2019-10-13

## 2019-10-13 RX ADMIN — Medication 50 MILLIGRAM(S): at 05:27

## 2019-10-13 RX ADMIN — Medication 1 MILLIGRAM(S): at 11:35

## 2019-10-13 RX ADMIN — Medication 1 TABLET(S): at 11:35

## 2019-10-13 RX ADMIN — Medication 1 APPLICATION(S): at 05:34

## 2019-10-13 RX ADMIN — Medication 20 MILLIGRAM(S): at 17:35

## 2019-10-13 RX ADMIN — Medication 125 MILLIGRAM(S): at 23:03

## 2019-10-13 RX ADMIN — WARFARIN SODIUM 2.5 MILLIGRAM(S): 2.5 TABLET ORAL at 23:03

## 2019-10-13 RX ADMIN — AMIODARONE HYDROCHLORIDE 200 MILLIGRAM(S): 400 TABLET ORAL at 05:27

## 2019-10-13 RX ADMIN — ALBUTEROL 2.5 MILLIGRAM(S): 90 AEROSOL, METERED ORAL at 18:53

## 2019-10-13 RX ADMIN — Medication 125 MILLIGRAM(S): at 05:27

## 2019-10-13 RX ADMIN — ATORVASTATIN CALCIUM 20 MILLIGRAM(S): 80 TABLET, FILM COATED ORAL at 23:03

## 2019-10-13 RX ADMIN — Medication 125 MILLIGRAM(S): at 17:35

## 2019-10-13 RX ADMIN — Medication 50 MILLIGRAM(S): at 17:35

## 2019-10-13 RX ADMIN — Medication 1 APPLICATION(S): at 16:59

## 2019-10-13 RX ADMIN — Medication 125 MICROGRAM(S): at 05:27

## 2019-10-13 RX ADMIN — Medication 125 MILLIGRAM(S): at 11:35

## 2019-10-13 RX ADMIN — Medication 300 MILLIGRAM(S): at 11:35

## 2019-10-13 NOTE — PROGRESS NOTE ADULT - ASSESSMENT
Impression:  89M.  hx prior CDI.  admitted 10/09/2019.  presented to ED c/o diarrhea.  onset 2 days prior to admission.  a/w fever.    PMHx:  AF (w);  PPM;  HTN;  HLD;  HCV.    Assessment/Plan:  1.	recurrent C. dif colitis.   vancomycin PO.  2.	UTI.  UCx (+) 100K GNR.  ? rxn cefepime.  ABx changed to aztreonam.  3.	AF.  anticoagulation:  INR subtherapeutic.  warfarin 5mg po tonight, then resume 1mg tomorrow.  rate control:  metoprolol.  rhythm control: amiodarone.  4.	disposition, anticipate discharge to [facility]. Impression:  89M.  hx prior CDI.  admitted 10/09/2019.  presented to ED c/o diarrhea.  onset 2 days prior to admission.  a/w fever.    PMHx:  AF (w);  PPM;  HTN;  HLD;  HCV.    Assessment/Plan:  1.	recurrent C. dif colitis.   vancomycin PO.  2.	UTI.  UCx (+) 100K GNR.  ? rxn cefepime.  ABx changed to aztreonam.  3.	AF.  anticoagulation:  INR subtherapeutic.  warfarin 2.5mg po tonight, then resume 1mg tomorrow.  rate control:  metoprolol.  rhythm control: amiodarone.  4.	disposition, anticipate discharge to [facility].

## 2019-10-13 NOTE — PROGRESS NOTE ADULT - SUBJECTIVE AND OBJECTIVE BOX
CC:  Patient is a 89y old  Male who presents with a chief complaint of complain of fever and cough (12 Oct 2019 15:48)    SUBJECTIVE:     -tolerating food trays.  -BM less frequent and formed.  -breathing comfortably.    ROS:  all other review of systems are negative unless indicated above.    acetaminophen   Tablet .. 650 milliGRAM(s) Oral every 6 hours PRN  ALBUTerol    0.083% 2.5 milliGRAM(s) Nebulizer every 3 hours PRN  allopurinol 300 milliGRAM(s) Oral daily  amiodarone    Tablet 200 milliGRAM(s) Oral daily  ammonium lactate 12% Lotion 1 Application(s) Topical two times a day  atorvastatin 20 milliGRAM(s) Oral at bedtime  aztreonam  IVPB 1000 milliGRAM(s) IV Intermittent every 12 hours  folic acid 1 milliGRAM(s) Oral daily  influenza   Vaccine 0.5 milliLiter(s) IntraMuscular once  levothyroxine 125 MICROGram(s) Oral daily  metoprolol tartrate 25 milliGRAM(s) Oral two times a day  multivitamin 1 Tablet(s) Oral daily  vancomycin    Solution 125 milliGRAM(s) Oral every 6 hours  warfarin 5 milliGRAM(s) Oral once    T(C): 36.4 (10-13-19 @ 11:14), Max: 36.9 (10-12-19 @ 21:07)  HR: 91 (10-13-19 @ 11:14) (60 - 91)  BP: 106/55 (10-13-19 @ 11:14) (106/55 - 117/51)  RR: 17 (10-13-19 @ 11:14) (16 - 18)  SpO2: 98% (10-13-19 @ 11:14) (98% - 98%)    Constitutional: NAD.   HEENT: PERRL, EOMI, MMM.  Neck: Soft and supple, No carotid bruit, No JVD  Respiratory: Breath sounds are clear bilaterally, No wheezing, rales or rhonchi  Cardiovascular: S1 and S2, regular rate and rhythm, no murmur, rub or gallop.  Gastrointestinal: Bowel Sounds present, soft, nontender, nondistended, no guarding, no rebound, no mass.  Extremities: No peripheral edema  Vascular: 2+ peripheral pulses  Neurological: A/O x 3, no focal deficits  Musculoskeletal: 5/5 strength b/l upper and lower extremities  Skin:  no visible rashes.                         9.4    6.91  )-----------( 154      ( 13 Oct 2019 08:17 )             30.2     PT/INR - ( 13 Oct 2019 08:17 )   PT: 18.2 sec;   INR: 1.62 ratio           10-13    139  |  113<H>  |  31<H>  ----------------------------<  95  4.1   |  20<L>  |  1.31<H>    Ca    7.9<L>      13 Oct 2019 08:17

## 2019-10-14 LAB
ANION GAP SERPL CALC-SCNC: 7 MMOL/L — SIGNIFICANT CHANGE UP (ref 5–17)
BUN SERPL-MCNC: 32 MG/DL — HIGH (ref 7–23)
CALCIUM SERPL-MCNC: 8.1 MG/DL — LOW (ref 8.5–10.1)
CHLORIDE SERPL-SCNC: 112 MMOL/L — HIGH (ref 96–108)
CO2 SERPL-SCNC: 25 MMOL/L — SIGNIFICANT CHANGE UP (ref 22–31)
CREAT SERPL-MCNC: 1.44 MG/DL — HIGH (ref 0.5–1.3)
CULTURE RESULTS: SIGNIFICANT CHANGE UP
CULTURE RESULTS: SIGNIFICANT CHANGE UP
GLUCOSE SERPL-MCNC: 100 MG/DL — HIGH (ref 70–99)
HCT VFR BLD CALC: 30.2 % — LOW (ref 39–50)
HGB BLD-MCNC: 9.3 G/DL — LOW (ref 13–17)
INR BLD: 1.74 RATIO — HIGH (ref 0.88–1.16)
MCHC RBC-ENTMCNC: 28.5 PG — SIGNIFICANT CHANGE UP (ref 27–34)
MCHC RBC-ENTMCNC: 30.8 GM/DL — LOW (ref 32–36)
MCV RBC AUTO: 92.6 FL — SIGNIFICANT CHANGE UP (ref 80–100)
PLATELET # BLD AUTO: 171 K/UL — SIGNIFICANT CHANGE UP (ref 150–400)
POTASSIUM SERPL-MCNC: 3.9 MMOL/L — SIGNIFICANT CHANGE UP (ref 3.5–5.3)
POTASSIUM SERPL-SCNC: 3.9 MMOL/L — SIGNIFICANT CHANGE UP (ref 3.5–5.3)
PROTHROM AB SERPL-ACNC: 19.7 SEC — HIGH (ref 10–12.9)
RBC # BLD: 3.26 M/UL — LOW (ref 4.2–5.8)
RBC # FLD: 17.9 % — HIGH (ref 10.3–14.5)
SODIUM SERPL-SCNC: 144 MMOL/L — SIGNIFICANT CHANGE UP (ref 135–145)
SPECIMEN SOURCE: SIGNIFICANT CHANGE UP
SPECIMEN SOURCE: SIGNIFICANT CHANGE UP
WBC # BLD: 5.44 K/UL — SIGNIFICANT CHANGE UP (ref 3.8–10.5)
WBC # FLD AUTO: 5.44 K/UL — SIGNIFICANT CHANGE UP (ref 3.8–10.5)

## 2019-10-14 RX ORDER — WARFARIN SODIUM 2.5 MG/1
2.5 TABLET ORAL AT BEDTIME
Refills: 0 | Status: DISCONTINUED | OUTPATIENT
Start: 2019-10-14 | End: 2019-10-16

## 2019-10-14 RX ADMIN — Medication 1 APPLICATION(S): at 05:29

## 2019-10-14 RX ADMIN — Medication 125 MILLIGRAM(S): at 05:29

## 2019-10-14 RX ADMIN — Medication 1 MILLIGRAM(S): at 11:21

## 2019-10-14 RX ADMIN — WARFARIN SODIUM 2.5 MILLIGRAM(S): 2.5 TABLET ORAL at 21:12

## 2019-10-14 RX ADMIN — Medication 1 TABLET(S): at 11:21

## 2019-10-14 RX ADMIN — Medication 125 MILLIGRAM(S): at 11:21

## 2019-10-14 RX ADMIN — Medication 125 MICROGRAM(S): at 05:29

## 2019-10-14 RX ADMIN — Medication 1 APPLICATION(S): at 17:31

## 2019-10-14 RX ADMIN — Medication 125 MILLIGRAM(S): at 17:29

## 2019-10-14 RX ADMIN — Medication 20 MILLIGRAM(S): at 05:29

## 2019-10-14 RX ADMIN — Medication 25 MILLIGRAM(S): at 17:29

## 2019-10-14 RX ADMIN — Medication 300 MILLIGRAM(S): at 11:21

## 2019-10-14 RX ADMIN — Medication 25 MILLIGRAM(S): at 05:29

## 2019-10-14 RX ADMIN — INFLUENZA VIRUS VACCINE 0.5 MILLILITER(S): 15; 15; 15; 15 SUSPENSION INTRAMUSCULAR at 11:20

## 2019-10-14 RX ADMIN — ATORVASTATIN CALCIUM 20 MILLIGRAM(S): 80 TABLET, FILM COATED ORAL at 21:12

## 2019-10-14 RX ADMIN — AMIODARONE HYDROCHLORIDE 200 MILLIGRAM(S): 400 TABLET ORAL at 05:29

## 2019-10-14 NOTE — PROGRESS NOTE ADULT - SUBJECTIVE AND OBJECTIVE BOX
HOSPITALIST ATTENDING PROGRESS NOTE    Chart and meds reviewed.  Patient seen and examined.    SUBJECTIVE:   sitting in chair, discussed case with , completed course for Psuedomonas UTI. Cdiff colitis on po vanco.   Patient awaiting bed in [facility].    All 10 systems reviewed and found to be negative with the exception of what has been described above.    MEDICATIONS  (STANDING):  allopurinol 300 milliGRAM(s) Oral daily  amiodarone    Tablet 200 milliGRAM(s) Oral daily  ammonium lactate 12% Lotion 1 Application(s) Topical two times a day  atorvastatin 20 milliGRAM(s) Oral at bedtime  folic acid 1 milliGRAM(s) Oral daily  furosemide    Tablet 20 milliGRAM(s) Oral daily  levothyroxine 125 MICROGram(s) Oral daily  metoprolol tartrate 25 milliGRAM(s) Oral two times a day  multivitamin 1 Tablet(s) Oral daily  vancomycin    Solution 125 milliGRAM(s) Oral every 6 hours  warfarin 1 milliGRAM(s) Oral daily    MEDICATIONS  (PRN):  acetaminophen   Tablet .. 650 milliGRAM(s) Oral every 6 hours PRN Temp greater or equal to 38C (100.4F), Mild Pain (1 - 3)  ALBUTerol    0.083% 2.5 milliGRAM(s) Nebulizer every 3 hours PRN Shortness of Breath and/or Wheezing      VITALS:  T(F): 98.5 (10-14-19 @ 11:06), Max: 98.5 (10-14-19 @ 11:06)  HR: 59 (10-14-19 @ 11:06) (59 - 82)  BP: 126/45 (10-14-19 @ 11:06) (117/59 - 133/51)  RR: 18 (10-14-19 @ 11:06) (18 - 18)  SpO2: 100% (10-14-19 @ 11:06) (98% - 100%)  Wt(kg): --    I&O's Summary      CAPILLARY BLOOD GLUCOSE          PHYSICAL EXAM:    HEENT:  pupils equal and reactive, EOMI, no oropharyngeal lesions, erythema, exudates, oral thrush  NECK:   supple, no carotid bruits, no palpable lymph nodes, no thyromegaly  CV:  +S1, +S2, regular, no murmurs or rubs  RESP:   lungs clear to auscultation bilaterally, no wheezing, rales, rhonchi, good air entry bilaterally  BREAST:  not examined  GI:  abdomen soft, non-tender, non-distended, normal BS, no bruits, no abdominal masses, no palpable masses  RECTAL:  not examined  :  not examined  MSK:   normal muscle tone, no atrophy, no rigidity, no contractions  EXT:  no clubbing, no cyanosis, no edema, no calf pain, swelling or erythema  VASCULAR:  pulses equal and symmetric in the upper and lower extremities  NEURO:  AAOX3, no focal neurological deficits, follows all commands, able to move extremities spontaneously  SKIN:  no ulcers, lesions or rashes    LABS:                            9.3    5.44  )-----------( 171      ( 14 Oct 2019 07:25 )             30.2     10-14    144  |  112<H>  |  32<H>  ----------------------------<  100<H>  3.9   |  25  |  1.44<H>    Ca    8.1<L>      14 Oct 2019 07:25            PT/INR - ( 14 Oct 2019 07:25 )   PT: 19.7 sec;   INR: 1.74 ratio                                           CULTURES:

## 2019-10-14 NOTE — PROGRESS NOTE ADULT - ASSESSMENT
89 year old Male with PMHx of AFib, ppm on Amiodarone and Coumadin, HTN, HLD, recent C. diff infection for which he finished 1 month course of PO Antibiotics treatment three weeks ago, admitted on 10/9 for evaluation fever, chills, cough and chest congestion of preceding day; also notes loose stools since admission, along with pain on urination. The patient did not have any recent dental work or recent travel. Admission lab work significant for elevated lactate.  1. Patient admitted with sepsis possibly due to CDiff colitis versus urinary origin given abnormal imaging and pyuria and patient complaints  - follow up cultures ---- found to have gram negative rods which is Pseudomonas aeruginosa; most likely causing the admission for sepsis of urinary origin  - serial cbc and monitor temperature   - reviewed prior medical records to evaluate for resistant or atypical pathogens   - oxygen and nebs as needed   - iv hydration and supportive care   - day #4 po vancomycin for Cdiff; should complete 10 more days po vancomycin  - maintain contact isolation for now  - completed total 5 days antibiotics for Pseudomonas  - tolerating antibiotics without rashes or side effects   2. other issues; per medicine  Okay from id standpoint to discharge to snf on po vancomycin for 10 more days

## 2019-10-14 NOTE — PROGRESS NOTE ADULT - ASSESSMENT
Impression:  89M.  hx prior CDI.  admitted 10/09/2019.  presented to ED c/o diarrhea.  onset 2 days prior to admission.  a/w fever.    PMHx:  AF (w);  PPM;  HTN;  HLD;  HCV.    Assessment/Plan:  1.	recurrent C. dif colitis.   vancomycin PO, duration for 10 more days per ID  2.	UTI. completed course of IV abx. no further treatment needed, d/w   3.	AF.  anticoagulation:  INR subtherapeutic.  warfarin 2.5mg po tonight again, then resume 1mg tomorrow.  rate control:  metoprolol.  rhythm control: amiodarone.  4.	Rash: present prior to arrival, no further tx needed.   5.	disposition, anticipate discharge to [facility].

## 2019-10-14 NOTE — PROGRESS NOTE ADULT - SUBJECTIVE AND OBJECTIVE BOX
Date of service: 10-14-19 @ 11:10      Patient still with itchy back; states he had this prior to admission  Loss iv access, have stopped aztreonam as of last nite      ROS: no fever or chills; denies dizziness, no HA, no SOB or cough, no abdominal pain, no diarrhea or constipation; no dysuria, no urinary frequency, no legs pains    MEDICATIONS  (STANDING):  allopurinol 300 milliGRAM(s) Oral daily  amiodarone    Tablet 200 milliGRAM(s) Oral daily  ammonium lactate 12% Lotion 1 Application(s) Topical two times a day  atorvastatin 20 milliGRAM(s) Oral at bedtime  folic acid 1 milliGRAM(s) Oral daily  furosemide    Tablet 20 milliGRAM(s) Oral daily  influenza   Vaccine 0.5 milliLiter(s) IntraMuscular once  levothyroxine 125 MICROGram(s) Oral daily  metoprolol tartrate 25 milliGRAM(s) Oral two times a day  multivitamin 1 Tablet(s) Oral daily  vancomycin    Solution 125 milliGRAM(s) Oral every 6 hours  warfarin 1 milliGRAM(s) Oral daily    MEDICATIONS  (PRN):  acetaminophen   Tablet .. 650 milliGRAM(s) Oral every 6 hours PRN Temp greater or equal to 38C (100.4F), Mild Pain (1 - 3)  ALBUTerol    0.083% 2.5 milliGRAM(s) Nebulizer every 3 hours PRN Shortness of Breath and/or Wheezing      Vital Signs Last 24 Hrs  T(C): 36.9 (14 Oct 2019 11:06), Max: 36.9 (14 Oct 2019 11:06)  T(F): 98.5 (14 Oct 2019 11:06), Max: 98.5 (14 Oct 2019 11:06)  HR: 59 (14 Oct 2019 11:06) (59 - 91)  BP: 126/45 (14 Oct 2019 11:06) (106/55 - 133/51)  BP(mean): --  RR: 18 (14 Oct 2019 11:06) (17 - 18)  SpO2: 100% (14 Oct 2019 11:06) (98% - 100%)    Physical Exam:        PE:    Constitutional: frail looking  HEENT: NC/AT, EOMI, PERRLA, conjunctivae clear; ears and nose atraumatic; pharynx clear  Neck: supple; thyroid not palpable  Back: no tenderness, fullness of neck bilaterally, no palpable lymph nodes  Respiratory: respiratory effort normal; clear to auscultation  Cardiovascular: S1S2 regular, no murmurs  Abdomen: soft, not tender, not distended, positive BS; no liver or spleen organomegaly  Genitourinary: no suprapubic tenderness  Musculoskeletal: no muscle tenderness, no joint swelling or tenderness  Neurological/ Psychiatric: AxOx3, judgement and insight normal;  moving all extremities  Skin: blush on upper back     Labs: all available labs reviewed                      Labs:                        9.3    5.44  )-----------( 171      ( 14 Oct 2019 07:25 )             30.2     10-14    144  |  112<H>  |  32<H>  ----------------------------<  100<H>  3.9   |  25  |  1.44<H>    Ca    8.1<L>      14 Oct 2019 07:25             Cultures:       Culture - Stool (collected 10-09-19 @ 18:14)  Source: .Stool None  Final Report (10-11-19 @ 21:44):    No enteric pathogens isolated.    (Stool culture examined for Salmonella,    Shigella, Campylobacter, Aeromonas, Plesiomonas,    Vibrio, E.coli O157 and Yersinia)    Culture - Urine (collected 10-09-19 @ 18:13)  Source: .Urine None  Final Report (10-11-19 @ 21:09):    >100,000 CFU/ml Pseudomonas aeruginosa  Organism: Pseudomonas aeruginosa (10-11-19 @ 21:09)  Organism: Pseudomonas aeruginosa (10-11-19 @ 21:09)      -  Amikacin: S <=16      -  Aztreonam: S 8      -  Cefepime: S 8      -  Ceftazidime: S 4      -  Ciprofloxacin: S <=1      -  Gentamicin: I 8      -  Imipenem: S 2      -  Levofloxacin: S <=2      -  Meropenem: S <=1      -  Piperacillin/Tazobactam: S <=16      -  Tobramycin: S <=4      Method Type: DARIN    Culture - Blood (collected 10-09-19 @ 18:13)  Source: .Blood None  Preliminary Report (10-10-19 @ 23:01):    No growth to date.    Culture - Blood (collected 10-09-19 @ 18:13)  Source: .Blood None  Preliminary Report (10-10-19 @ 23:01):    No growth to date.                      Clostridium difficile Toxin by PCR (10.09.19 @ 18:14)    Clostridium difficile Toxin by PCR: The results of this test should be interpreted with consideration of all  clinical and laboratory findings. This test determines the presence of  the C. difficile tcdB gene at a given time and is not intended to  identify antibiotic associated disease or C. difficile infection without  clinical context.  Successful treatment is based on the resolution of clinical symptoms.  This test should not be used as a "test of cure" because C. difficile DNA  will persist after successful treatment. Repeat testing will not be  permitted.    This test is performed on the BD MAX system using Real-Time PCR and  fluorogenic target-specific hybridization.    C Diff by PCR Result: Detected    < from: CT Abdomen and Pelvis w/ Oral Cont (06.25.19 @ 17:52) >    EXAM:  CT ABDOMEN AND PELVIS OC                            PROCEDURE DATE:  06/25/2019          INTERPRETATION:  CLINICAL STATEMENT: diarrhea, recently treated for C.   difficile    TECHNIQUE: CT of the abdomen and pelvis was performed without IV   contrast. Oral contrast was administered.    COMPARISON: 8/6/2015    FINDINGS:    The lower chest is remarkable for pacemaker wires. There are minimal   bilateral pleural effusions    The liver is unremarkable. The gallbladder is not identified and may be   surgically absent. Dropped gallstones are suspected in the right abdomen.    The spleen, pancreas and adrenal glands are unremarkable.The kidneys   demonstrate small cysts. Additionally there is an indeterminate exophytic   lesion arising fromthe lateral lower pole of the right kidney which was   present on the prior study and appears similar or smaller consistent with   a benign etiology. The fluid-filled bladder appears intact.    There is no bowel obstruction. There is no CT evidence of appendicitis.   There is severe wall thickening of the colon diffusely which is   compatible with pseudomembranous colitis    There is no intraperitoneal free air.  There is no free fluid. The aorta   is not aneurysmal. There is atherosclerotic calcification of the   abdominal aorta and its branches.    There is no significant abdominal, retroperitoneal or pelvic   lymphadenopathy. The pelvic structures are unremarkable.    The osseous structures demonstrate osteopenia and degenerative changes.   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 There is heterotopic   ossification about the left hip..    IMPRESSION:    Severe diffuse colonic wall thickening may be secondary to C. difficile.   Recommend clinical and laboratory correlation.  Incidentally seen are bilateral renal cysts  Gallbladder has been removed since the prior study. Dropped gallstones   are suspected in the right abdomen    < end of copied text >        Radiology: all available radiological tests reviewed    Advanced directives addressed: full resuscitation

## 2019-10-15 LAB
ANION GAP SERPL CALC-SCNC: 6 MMOL/L — SIGNIFICANT CHANGE UP (ref 5–17)
APTT BLD: 34.3 SEC — SIGNIFICANT CHANGE UP (ref 27.5–36.3)
BUN SERPL-MCNC: 31 MG/DL — HIGH (ref 7–23)
CALCIUM SERPL-MCNC: 8.3 MG/DL — LOW (ref 8.5–10.1)
CHLORIDE SERPL-SCNC: 111 MMOL/L — HIGH (ref 96–108)
CO2 SERPL-SCNC: 27 MMOL/L — SIGNIFICANT CHANGE UP (ref 22–31)
CREAT SERPL-MCNC: 1.36 MG/DL — HIGH (ref 0.5–1.3)
GLUCOSE SERPL-MCNC: 105 MG/DL — HIGH (ref 70–99)
HCT VFR BLD CALC: 33.7 % — LOW (ref 39–50)
HGB BLD-MCNC: 10.3 G/DL — LOW (ref 13–17)
INR BLD: 1.65 RATIO — HIGH (ref 0.88–1.16)
MCHC RBC-ENTMCNC: 28.2 PG — SIGNIFICANT CHANGE UP (ref 27–34)
MCHC RBC-ENTMCNC: 30.6 GM/DL — LOW (ref 32–36)
MCV RBC AUTO: 92.3 FL — SIGNIFICANT CHANGE UP (ref 80–100)
PLATELET # BLD AUTO: 204 K/UL — SIGNIFICANT CHANGE UP (ref 150–400)
POTASSIUM SERPL-MCNC: 3.8 MMOL/L — SIGNIFICANT CHANGE UP (ref 3.5–5.3)
POTASSIUM SERPL-SCNC: 3.8 MMOL/L — SIGNIFICANT CHANGE UP (ref 3.5–5.3)
PROTHROM AB SERPL-ACNC: 18.6 SEC — HIGH (ref 10–12.9)
RBC # BLD: 3.65 M/UL — LOW (ref 4.2–5.8)
RBC # FLD: 17.9 % — HIGH (ref 10.3–14.5)
SODIUM SERPL-SCNC: 144 MMOL/L — SIGNIFICANT CHANGE UP (ref 135–145)
WBC # BLD: 6.44 K/UL — SIGNIFICANT CHANGE UP (ref 3.8–10.5)
WBC # FLD AUTO: 6.44 K/UL — SIGNIFICANT CHANGE UP (ref 3.8–10.5)

## 2019-10-15 RX ADMIN — Medication 25 MILLIGRAM(S): at 16:44

## 2019-10-15 RX ADMIN — Medication 1 APPLICATION(S): at 16:44

## 2019-10-15 RX ADMIN — ATORVASTATIN CALCIUM 20 MILLIGRAM(S): 80 TABLET, FILM COATED ORAL at 21:54

## 2019-10-15 RX ADMIN — Medication 125 MICROGRAM(S): at 05:58

## 2019-10-15 RX ADMIN — Medication 125 MILLIGRAM(S): at 01:03

## 2019-10-15 RX ADMIN — Medication 1 APPLICATION(S): at 06:00

## 2019-10-15 RX ADMIN — Medication 300 MILLIGRAM(S): at 12:35

## 2019-10-15 RX ADMIN — WARFARIN SODIUM 2.5 MILLIGRAM(S): 2.5 TABLET ORAL at 21:54

## 2019-10-15 RX ADMIN — Medication 20 MILLIGRAM(S): at 05:58

## 2019-10-15 RX ADMIN — Medication 125 MILLIGRAM(S): at 12:36

## 2019-10-15 RX ADMIN — AMIODARONE HYDROCHLORIDE 200 MILLIGRAM(S): 400 TABLET ORAL at 05:58

## 2019-10-15 RX ADMIN — Medication 25 MILLIGRAM(S): at 05:58

## 2019-10-15 RX ADMIN — Medication 125 MILLIGRAM(S): at 16:44

## 2019-10-15 RX ADMIN — Medication 1 TABLET(S): at 12:35

## 2019-10-15 RX ADMIN — Medication 125 MILLIGRAM(S): at 21:56

## 2019-10-15 RX ADMIN — Medication 125 MILLIGRAM(S): at 05:58

## 2019-10-15 RX ADMIN — Medication 1 MILLIGRAM(S): at 12:36

## 2019-10-15 NOTE — PROGRESS NOTE ADULT - ASSESSMENT
Impression:  89M.  hx prior CDI.  admitted 10/09/2019.  presented to ED c/o diarrhea.  onset 2 days prior to admission.  a/w fever.    PMHx:  AF (w);  PPM;  HTN;  HLD;  HCV.    Assessment/Plan:  1.	recurrent C. dif colitis.   vancomycin PO, duration for 10 more days per ID  2.	UTI. completed course of IV abx. no further treatment needed, d/w   3.	AF.  anticoagulation:  INR subtherapeutic.  warfarin 2.5mg po tonight again, then resume 1mg tomorrow.  rate control:  metoprolol.  rhythm control: amiodarone.  4.	Rash: present prior to arrival, no further tx needed.   5.	disposition, anticipate discharge to [facility]. Impression:  89M.  hx prior CDI.  admitted 10/09/2019.  presented to ED c/o diarrhea.  onset 2 days prior to admission.  a/w fever.    PMHx:  AF (w);  PPM;  HTN;  HLD;  HCV.    Assessment/Plan:  1.	recurrent C. dif colitis.   vancomycin PO, duration for 9 more days per ID  2.	UTI. completed course of IV abx. no further treatment needed, d/w   3.	AF.  anticoagulation:  INR subtherapeutic.  warfarin 2.5mg po tonight again, then resume 1mg tomorrow.  rate control:  metoprolol.  rhythm control: amiodarone.  4.	Rash: present prior to arrival, no further tx needed. pt has derm follow up planned   5.	disposition, anticipate discharge to [facility] when bed avail

## 2019-10-15 NOTE — PROGRESS NOTE ADULT - ATTENDING COMMENTS
Patient seen and examined with PA student Kristyn Trevino.  Agree with physical exam and assessment and plan with changes made where necessary.    - pt with severe sepsis POA - resolved and associated with metabolic encephalopathy - resolved  - completed 5 days IV abx and to continue po abx for c.diff   - monitor INR with goal 2-3

## 2019-10-15 NOTE — PROGRESS NOTE ADULT - REASON FOR ADMISSION
complain of fever and cough

## 2019-10-15 NOTE — CDI QUERY NOTE - NSCDIOTHERTXTBX2_GEN_ALL_CORE_FT
Patient came in to the ED, alert and oriented without any neurological deficits.    ED Provider Note 10-9-19 @ 17:23  NEUROLOGICAL: Alert and oriented, no focal deficits, no motor or sensory deficits.    On 10/15/19 @ 10:01 it was documented that the patient was disoriented to time and situation.  WDL Except disoriented to time, situation.    Chloride, Serum: 113 mmol/L (10.12.19 @ 07:07)  Blood Urea Nitrogen, Serum: 32 mg/dL (10.14.19 @ 07:25)  Creatinine, Serum: 1.44 mg/dL (10.14.19 @ 07:25)  eGFR if Non : 46:     Is the above clinical criteria indicative of a further diagnosis?  A) Metabolic Encephalopathy  B) Other type of Encephalopathy, please specify:  C) Other, please specify:

## 2019-10-15 NOTE — PROGRESS NOTE ADULT - PROVIDER SPECIALTY LIST ADULT
Hospitalist
Infectious Disease

## 2019-10-15 NOTE — CDI QUERY NOTE - NSCDIOTHERTXTBX_GEN_ALL_CORE_HH
This patient was admitted with recurrent enterocolitis, FRANCINE, and a UTI.    Documentation from infectious disease diagnose the patient with sepsis.    Progress Note Adult-Infecitous Disease Attending 10-14-19 @ 11:08  1. Patient admitted with sepsis possibly due to CDiff colitis versus urinary origin given abnormal imaging and pyuria and patient complaints  - follow up cultures ---- found to have gram negative rods which is Pseudomonas aeruginosa; most likely causing the admission for sepsis of urinary origin    WBC Count: 13.23 K/uL (10.09.19 @ 18:13)  Lactate, Blood: 3.2 mmol/L (10.09.19 @ 22:10)    Vital Signs  · Temp (F) 101  Sepsis Suspected:  · Sepsis Criteria Met:  Abnormal VS & WBC, Abnormal Lactate  · Abnormal VS:  Temp > 100F or < 96.8F; SBP < 90 mmHG; HR > 120bpm; Resp > 24/min  · Abnormal WBC:  < 4,000 OR > 12,000      Do you agree with the above diagnosis of Sepsis?  A) Sepsis POA, now resolving.  B) Early sepsis POA, now resolving.  C) No clinical indication of sepsis.  D) Other, please specify:

## 2019-10-15 NOTE — PROGRESS NOTE ADULT - SUBJECTIVE AND OBJECTIVE BOX
HOSPITALIST ATTENDING PROGRESS NOTE    Chart and meds reviewed.  Patient seen and examined.    SUBJECTIVE:   sitting in chair, discussed case with , completed course for Psuedomonas UTI. Cdiff colitis on po vanco.   Patient awaiting bed in [facility].    10/15/19 pt feels     All 10 systems reviewed and found to be negative with the exception of what has been described above.    MEDICATIONS  (STANDING):  allopurinol 300 milliGRAM(s) Oral daily  amiodarone    Tablet 200 milliGRAM(s) Oral daily  ammonium lactate 12% Lotion 1 Application(s) Topical two times a day  atorvastatin 20 milliGRAM(s) Oral at bedtime  folic acid 1 milliGRAM(s) Oral daily  furosemide    Tablet 20 milliGRAM(s) Oral daily  levothyroxine 125 MICROGram(s) Oral daily  metoprolol tartrate 25 milliGRAM(s) Oral two times a day  multivitamin 1 Tablet(s) Oral daily  vancomycin    Solution 125 milliGRAM(s) Oral every 6 hours  warfarin 1 milliGRAM(s) Oral daily    MEDICATIONS  (PRN):  acetaminophen   Tablet .. 650 milliGRAM(s) Oral every 6 hours PRN Temp greater or equal to 38C (100.4F), Mild Pain (1 - 3)  ALBUTerol    0.083% 2.5 milliGRAM(s) Nebulizer every 3 hours PRN Shortness of Breath and/or Wheezing      VITALS:  T(F): 98.5 (10-14-19 @ 11:06), Max: 98.5 (10-14-19 @ 11:06)  HR: 59 (10-14-19 @ 11:06) (59 - 82)  BP: 126/45 (10-14-19 @ 11:06) (117/59 - 133/51)  RR: 18 (10-14-19 @ 11:06) (18 - 18)  SpO2: 100% (10-14-19 @ 11:06) (98% - 100%)  Wt(kg): --    I&O's Summary      CAPILLARY BLOOD GLUCOSE          PHYSICAL EXAM:    HEENT:  pupils equal and reactive, EOMI, no oropharyngeal lesions, erythema, exudates, oral thrush  NECK:   supple, no carotid bruits, no palpable lymph nodes, no thyromegaly  CV:  +S1, +S2, regular, no murmurs or rubs  RESP:   lungs clear to auscultation bilaterally, no wheezing, rales, rhonchi, good air entry bilaterally  BREAST:  not examined  GI:  abdomen soft, non-tender, non-distended, normal BS, no bruits, no abdominal masses, no palpable masses  RECTAL:  not examined  :  not examined  MSK:   normal muscle tone, no atrophy, no rigidity, no contractions  EXT:  no clubbing, no cyanosis, no edema, no calf pain, swelling or erythema  VASCULAR:  pulses equal and symmetric in the upper and lower extremities  NEURO:  AAOX3, no focal neurological deficits, follows all commands, able to move extremities spontaneously  SKIN:  no ulcers, lesions or rashes    LABS:                            9.3    5.44  )-----------( 171      ( 14 Oct 2019 07:25 )             30.2     10-14    144  |  112<H>  |  32<H>  ----------------------------<  100<H>  3.9   |  25  |  1.44<H>    Ca    8.1<L>      14 Oct 2019 07:25            PT/INR - ( 14 Oct 2019 07:25 )   PT: 19.7 sec;   INR: 1.74 ratio                                           CULTURES: HOSPITALIST ATTENDING PROGRESS NOTE    Chart and meds reviewed.  Patient seen and examined.    SUBJECTIVE:   sitting in chair, discussed case with , completed course for Psuedomonas UTI. Cdiff colitis on po vanco.   Patient awaiting bed in [facility].    10/15/19 pt feels     All 10 systems reviewed and found to be negative with the exception of what has been described above.    Vital Signs Last 24 Hrs  T(C): 36.4 (15 Oct 2019 10:52), Max: 36.6 (15 Oct 2019 05:18)  T(F): 97.5 (15 Oct 2019 10:52), Max: 97.8 (15 Oct 2019 05:18)  HR: 60 (15 Oct 2019 10:52) (60 - 68)  BP: 128/53 (15 Oct 2019 10:52) (128/53 - 138/50)  BP(mean): --  RR: 18 (15 Oct 2019 10:52) (17 - 18)  SpO2: 98% (15 Oct 2019 10:52) (98% - 100%)      PHYSICAL EXAM:  HEENT:  pupils equal and reactive, EOMI, no oropharyngeal lesions, erythema, exudates, oral thrush  NECK:   supple, no carotid bruits, no palpable lymph nodes, no thyromegaly  CV:  +S1, +S2, regular, no murmurs or rubs  RESP:   lungs clear to auscultation bilaterally, no wheezing, rales, rhonchi, good air entry bilaterally  BREAST:  not examined  GI:  abdomen soft, non-tender, non-distended, normal BS, no bruits, no abdominal masses, no palpable masses  RECTAL:  not examined  :  not examined  MSK:   normal muscle tone, no atrophy, no rigidity, no contractions  EXT:  no clubbing, no cyanosis, no edema, no calf pain, swelling or erythema  VASCULAR:  pulses equal and symmetric in the upper and lower extremities  NEURO:  AAOX3, no focal neurological deficits, follows all commands, able to move extremities spontaneously  SKIN:  no ulcers, lesions or rashes    LABS:                          10.3   6.44  )-----------( 204      ( 15 Oct 2019 08:37 )             33.7     10-15    144  |  111<H>  |  31<H>  ----------------------------<  105<H>  3.8   |  27  |  1.36<H>    Ca    8.3<L>      15 Oct 2019 08:37 HOSPITALIST ATTENDING PROGRESS NOTE    Chart and meds reviewed.  Patient seen and examined.    SUBJECTIVE:   sitting in chair, discussed case with , completed course for Psuedomonas UTI. Cdiff colitis on po vanco.   Patient awaiting bed in [facility].    10/15/19 pt feels     All 10 systems reviewed and found to be negative with the exception of what has been described above.    Vital Signs Last 24 Hrs  T(C): 36.4 (15 Oct 2019 10:52), Max: 36.6 (15 Oct 2019 05:18)  T(F): 97.5 (15 Oct 2019 10:52), Max: 97.8 (15 Oct 2019 05:18)  HR: 60 (15 Oct 2019 10:52) (60 - 68)  BP: 128/53 (15 Oct 2019 10:52) (128/53 - 138/50)  BP(mean): --  RR: 18 (15 Oct 2019 10:52) (17 - 18)  SpO2: 98% (15 Oct 2019 10:52) (98% - 100%)      PHYSICAL EXAM:  HEENT:  pupils equal and reactive, EOMI  NECK:   supple, no carotid bruits, no palpable lymph nodes, no thyromegaly  CV:  +S1, +S2, irregular, no murmurs or rubs  RESP:   lungs clear to auscultation bilaterally, no wheezing, rales, rhonchi, good air entry bilaterally  GI:  abdomen soft, non-tender, non-distended, normal BS  MSK:   normal muscle tone, no atrophy, no rigidity, no contractions  EXT:  no clubbing, no cyanosis, no edema, no calf pain, swelling or erythema  VASCULAR:  pulses equal and symmetric in the upper and lower extremities  NEURO:  AAOX3, no focal neurological deficits    LABS:                          10.3   6.44  )-----------( 204      ( 15 Oct 2019 08:37 )             33.7     10-15    144  |  111<H>  |  31<H>  ----------------------------<  105<H>  3.8   |  27  |  1.36<H>    Ca    8.3<L>      15 Oct 2019 08:37  PT/INR - ( 15 Oct 2019 08:37 )   PT: 18.6 sec;   INR: 1.65 ratio         PTT - ( 15 Oct 2019 08:37 )  PTT:34.3 sec

## 2019-10-16 ENCOUNTER — TRANSCRIPTION ENCOUNTER (OUTPATIENT)
Age: 84
End: 2019-10-16

## 2019-10-16 VITALS
DIASTOLIC BLOOD PRESSURE: 61 MMHG | TEMPERATURE: 98 F | SYSTOLIC BLOOD PRESSURE: 133 MMHG | HEART RATE: 59 BPM | OXYGEN SATURATION: 99 % | RESPIRATION RATE: 18 BRPM

## 2019-10-16 DIAGNOSIS — I48.91 UNSPECIFIED ATRIAL FIBRILLATION: ICD-10-CM

## 2019-10-16 LAB
INR BLD: 1.85 RATIO — HIGH (ref 0.88–1.16)
PROTHROM AB SERPL-ACNC: 20.9 SEC — HIGH (ref 10–12.9)

## 2019-10-16 RX ORDER — VANCOMYCIN HCL 1 G
1 VIAL (EA) INTRAVENOUS
Qty: 0 | Refills: 0 | DISCHARGE
Start: 2019-10-16

## 2019-10-16 RX ORDER — FUROSEMIDE 40 MG
1 TABLET ORAL
Qty: 0 | Refills: 0 | DISCHARGE

## 2019-10-16 RX ORDER — FUROSEMIDE 40 MG
1 TABLET ORAL
Qty: 0 | Refills: 0 | DISCHARGE
Start: 2019-10-16

## 2019-10-16 RX ORDER — WARFARIN SODIUM 2.5 MG/1
1 TABLET ORAL
Qty: 0 | Refills: 0 | DISCHARGE

## 2019-10-16 RX ORDER — WARFARIN SODIUM 2.5 MG/1
1 TABLET ORAL
Qty: 0 | Refills: 0 | DISCHARGE
Start: 2019-10-16

## 2019-10-16 RX ADMIN — Medication 125 MILLIGRAM(S): at 17:11

## 2019-10-16 RX ADMIN — Medication 1 MILLIGRAM(S): at 11:08

## 2019-10-16 RX ADMIN — Medication 20 MILLIGRAM(S): at 06:00

## 2019-10-16 RX ADMIN — Medication 1 APPLICATION(S): at 17:11

## 2019-10-16 RX ADMIN — Medication 125 MILLIGRAM(S): at 11:10

## 2019-10-16 RX ADMIN — Medication 125 MICROGRAM(S): at 05:59

## 2019-10-16 RX ADMIN — Medication 25 MILLIGRAM(S): at 06:00

## 2019-10-16 RX ADMIN — Medication 300 MILLIGRAM(S): at 11:08

## 2019-10-16 RX ADMIN — Medication 1 TABLET(S): at 11:08

## 2019-10-16 RX ADMIN — Medication 125 MILLIGRAM(S): at 05:59

## 2019-10-16 RX ADMIN — Medication 1 APPLICATION(S): at 06:00

## 2019-10-16 RX ADMIN — AMIODARONE HYDROCHLORIDE 200 MILLIGRAM(S): 400 TABLET ORAL at 05:59

## 2019-10-16 NOTE — DISCHARGE NOTE PROVIDER - HOSPITAL COURSE
Vital Signs Last 24 Hrs    T(C): 37 (16 Oct 2019 11:44), Max: 37 (16 Oct 2019 11:44)    T(F): 98.6 (16 Oct 2019 11:44), Max: 98.6 (16 Oct 2019 11:44)    HR: 61 (16 Oct 2019 11:44) (59 - 61)    BP: 112/48 (16 Oct 2019 11:44) (112/48 - 151/51)    BP(mean): --    RR: 18 (16 Oct 2019 11:44) (18 - 21)    SpO2: 99% (16 Oct 2019 11:44) (98% - 100%)        HEENT:   pupils equal and reactive, EOMI, no oropharyngeal lesions, erythema, exudates, oral thrush        NECK:   supple, no carotid bruits, no palpable lymph nodes, no thyromegaly        CV:  +S1, +S2, regular, no murmurs or rubs        RESP:   lungs clear to auscultation bilaterally, no wheezing, rales, rhonchi, good air entry bilaterally        BREAST:  not examined        GI:  abdomen soft, non-tender, non-distended, normal BS, no bruits, no abdominal masses, no palpable masses        RECTAL:  not examined        :  not examined        MSK:   normal muscle tone, no atrophy, no rigidity, no contractions        EXT:   no clubbing, no cyanosis, no edema, no calf pain, swelling or erythema        VASCULAR:  pulses equal and symmetric in the upper and lower extremities        NEURO:  AAOX3, no focal neurological deficits, follows all commands, able to move extremities spontaneously        SKIN:  no ulcers, lesions or rashes        15 Oct 2019 08:37        144    |  111    |  31       ----------------------------<  105      3.8     |  27     |  1.36         Ca    8.3        15 Oct 2019 08:37        PT/INR - ( 16 Oct 2019 07:53 )   PT: 20.9 sec;   INR: 1.85 ratio               PTT - ( 15 Oct 2019 08:37 )  PTT:34.3 secCBC Full  -  ( 15 Oct 2019 08:37 )    WBC Count : 6.44 K/uL    Hemoglobin : 10.3 g/dL    Hematocrit : 33.7 %    Platelet Count - Automated : 204 K/uL    Mean Cell Volume : 92.3 fl    Mean Cell Hemoglobin : 28.2 pg    Mean Cell Hemoglobin Concentration : 30.6 gm/dL                    Hospital Course:        89M w/ PMH of PPM, HTN, HLD, HCV, hx of c.diff, admitted 10/09/2019.  presented to ED c/o diarrhea.  onset 2 days prior to admission.  p/w fever.    Admitted with UTI and reccurrent C.diff. Patient urine cultures positive for pseudomonas. She completed treatment while inpatient with IV cefepime.     Patient will c/w treatment for c.diff for 9 more days.     Patient now cleared for discharge to [facility].

## 2019-10-16 NOTE — DISCHARGE NOTE PROVIDER - CARE PROVIDER_API CALL
Ceci Nunn (DO)  Family Medicine  [address] [place] [address], [place] [address]  Phone: [phone]  Fax: [phone]  Follow Up Time:

## 2019-10-16 NOTE — DISCHARGE NOTE NURSING/CASE MANAGEMENT/SOCIAL WORK - PATIENT PORTAL LINK FT
You can access the FollowMyHealth Patient Portal offered by [facility] by registering at the following website: [email or web address]. By joining [employer]’s FollowMyHealth portal, you will also be able to view your health information using other applications (apps) compatible with our system.

## 2019-10-16 NOTE — DISCHARGE NOTE PROVIDER - NSDCCPCAREPLAN_GEN_ALL_CORE_FT
PRINCIPAL DISCHARGE DIAGNOSIS  Diagnosis: UTI (urinary tract infection)  Assessment and Plan of Treatment: completed treatment      SECONDARY DISCHARGE DIAGNOSES  Diagnosis: Clostridium difficile colitis  Assessment and Plan of Treatment: continue with oral vancomycin till end of day 10.25.19

## 2019-10-21 DIAGNOSIS — A04.71 ENTEROCOLITIS DUE TO CLOSTRIDIUM DIFFICILE, RECURRENT: ICD-10-CM

## 2019-10-21 DIAGNOSIS — Z96.643 PRESENCE OF ARTIFICIAL HIP JOINT, BILATERAL: ICD-10-CM

## 2019-10-21 DIAGNOSIS — Z79.01 LONG TERM (CURRENT) USE OF ANTICOAGULANTS: ICD-10-CM

## 2019-10-21 DIAGNOSIS — I73.9 PERIPHERAL VASCULAR DISEASE, UNSPECIFIED: ICD-10-CM

## 2019-10-21 DIAGNOSIS — B96.5 PSEUDOMONAS (AERUGINOSA) (MALLEI) (PSEUDOMALLEI) AS THE CAUSE OF DISEASES CLASSIFIED ELSEWHERE: ICD-10-CM

## 2019-10-21 DIAGNOSIS — Z95.0 PRESENCE OF CARDIAC PACEMAKER: ICD-10-CM

## 2019-10-21 DIAGNOSIS — Z88.0 ALLERGY STATUS TO PENICILLIN: ICD-10-CM

## 2019-10-21 DIAGNOSIS — I12.9 HYPERTENSIVE CHRONIC KIDNEY DISEASE WITH STAGE 1 THROUGH STAGE 4 CHRONIC KIDNEY DISEASE, OR UNSPECIFIED CHRONIC KIDNEY DISEASE: ICD-10-CM

## 2019-10-21 DIAGNOSIS — N17.9 ACUTE KIDNEY FAILURE, UNSPECIFIED: ICD-10-CM

## 2019-10-21 DIAGNOSIS — G93.41 METABOLIC ENCEPHALOPATHY: ICD-10-CM

## 2019-10-21 DIAGNOSIS — Z90.49 ACQUIRED ABSENCE OF OTHER SPECIFIED PARTS OF DIGESTIVE TRACT: ICD-10-CM

## 2019-10-21 DIAGNOSIS — E78.5 HYPERLIPIDEMIA, UNSPECIFIED: ICD-10-CM

## 2019-10-21 DIAGNOSIS — I48.91 UNSPECIFIED ATRIAL FIBRILLATION: ICD-10-CM

## 2019-10-21 DIAGNOSIS — A41.9 SEPSIS, UNSPECIFIED ORGANISM: ICD-10-CM

## 2019-10-21 DIAGNOSIS — N39.0 URINARY TRACT INFECTION, SITE NOT SPECIFIED: ICD-10-CM

## 2019-10-21 DIAGNOSIS — N18.3 CHRONIC KIDNEY DISEASE, STAGE 3 (MODERATE): ICD-10-CM

## 2019-10-21 DIAGNOSIS — R65.20 SEVERE SEPSIS WITHOUT SEPTIC SHOCK: ICD-10-CM

## 2019-10-21 LAB
CULTURE RESULTS: SIGNIFICANT CHANGE UP
SPECIMEN SOURCE: SIGNIFICANT CHANGE UP

## 2020-01-01 ENCOUNTER — APPOINTMENT (OUTPATIENT)
Dept: UROLOGY | Facility: CLINIC | Age: 85
End: 2020-01-01
Payer: MEDICARE

## 2020-01-01 ENCOUNTER — NON-APPOINTMENT (OUTPATIENT)
Age: 85
End: 2020-01-01

## 2020-01-01 ENCOUNTER — APPOINTMENT (OUTPATIENT)
Dept: UROLOGY | Facility: CLINIC | Age: 85
End: 2020-01-01

## 2020-01-01 ENCOUNTER — INPATIENT (INPATIENT)
Facility: HOSPITAL | Age: 85
LOS: 0 days | DRG: 871 | End: 2020-11-24
Attending: SURGERY | Admitting: INTERNAL MEDICINE
Payer: MEDICARE

## 2020-01-01 ENCOUNTER — EMERGENCY (EMERGENCY)
Facility: HOSPITAL | Age: 85
LOS: 0 days | Discharge: HOME | End: 2020-05-12
Attending: EMERGENCY MEDICINE
Payer: MEDICARE

## 2020-01-01 VITALS
SYSTOLIC BLOOD PRESSURE: 116 MMHG | DIASTOLIC BLOOD PRESSURE: 58 MMHG | HEIGHT: 67 IN | RESPIRATION RATE: 17 BRPM | WEIGHT: 229.94 LBS | HEART RATE: 88 BPM | OXYGEN SATURATION: 97 % | TEMPERATURE: 98 F

## 2020-01-01 VITALS
HEART RATE: 83 BPM | WEIGHT: 205.91 LBS | TEMPERATURE: 98 F | SYSTOLIC BLOOD PRESSURE: 128 MMHG | DIASTOLIC BLOOD PRESSURE: 54 MMHG | OXYGEN SATURATION: 97 % | RESPIRATION RATE: 18 BRPM | HEIGHT: 67 IN

## 2020-01-01 VITALS
SYSTOLIC BLOOD PRESSURE: 114 MMHG | HEART RATE: 63 BPM | RESPIRATION RATE: 18 BRPM | TEMPERATURE: 98 F | OXYGEN SATURATION: 99 % | DIASTOLIC BLOOD PRESSURE: 58 MMHG

## 2020-01-01 VITALS
OXYGEN SATURATION: 97 % | DIASTOLIC BLOOD PRESSURE: 61 MMHG | SYSTOLIC BLOOD PRESSURE: 100 MMHG | HEART RATE: 59 BPM | BODY MASS INDEX: 35.79 KG/M2 | HEIGHT: 67 IN | WEIGHT: 228 LBS | TEMPERATURE: 97.9 F

## 2020-01-01 VITALS
BODY MASS INDEX: 35.63 KG/M2 | OXYGEN SATURATION: 96 % | WEIGHT: 227 LBS | SYSTOLIC BLOOD PRESSURE: 120 MMHG | DIASTOLIC BLOOD PRESSURE: 58 MMHG | HEIGHT: 67 IN

## 2020-01-01 DIAGNOSIS — Z96.643 PRESENCE OF ARTIFICIAL HIP JOINT, BILATERAL: ICD-10-CM

## 2020-01-01 DIAGNOSIS — R30.0 DYSURIA: ICD-10-CM

## 2020-01-01 DIAGNOSIS — Z88.0 ALLERGY STATUS TO PENICILLIN: ICD-10-CM

## 2020-01-01 DIAGNOSIS — A41.81 SEPSIS DUE TO ENTEROCOCCUS: ICD-10-CM

## 2020-01-01 DIAGNOSIS — R33.9 RETENTION OF URINE, UNSPECIFIED: ICD-10-CM

## 2020-01-01 DIAGNOSIS — Z95.0 PRESENCE OF CARDIAC PACEMAKER: ICD-10-CM

## 2020-01-01 DIAGNOSIS — Z96.643 PRESENCE OF ARTIFICIAL HIP JOINT, BILATERAL: Chronic | ICD-10-CM

## 2020-01-01 DIAGNOSIS — S51.811A LACERATION WITHOUT FOREIGN BODY OF RIGHT FOREARM, INITIAL ENCOUNTER: ICD-10-CM

## 2020-01-01 DIAGNOSIS — E87.6 HYPOKALEMIA: ICD-10-CM

## 2020-01-01 DIAGNOSIS — N32.89 OTHER SPECIFIED DISORDERS OF BLADDER: ICD-10-CM

## 2020-01-01 DIAGNOSIS — R31.9 HEMATURIA, UNSPECIFIED: ICD-10-CM
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
## 2020-01-01 DIAGNOSIS — N17.9 ACUTE KIDNEY FAILURE, UNSPECIFIED: ICD-10-CM

## 2020-01-01 DIAGNOSIS — I48.91 UNSPECIFIED ATRIAL FIBRILLATION: ICD-10-CM

## 2020-01-01 DIAGNOSIS — R65.21 SEVERE SEPSIS WITH SEPTIC SHOCK: ICD-10-CM

## 2020-01-01 DIAGNOSIS — N40.0 BENIGN PROSTATIC HYPERPLASIA WITHOUT LOWER URINARY TRACT SYMPMS: ICD-10-CM

## 2020-01-01 DIAGNOSIS — R57.9 SHOCK, UNSPECIFIED: ICD-10-CM

## 2020-01-01 DIAGNOSIS — I73.9 PERIPHERAL VASCULAR DISEASE, UNSPECIFIED: ICD-10-CM

## 2020-01-01 DIAGNOSIS — Z66 DO NOT RESUSCITATE: ICD-10-CM
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
## 2020-01-01 DIAGNOSIS — I10 ESSENTIAL (PRIMARY) HYPERTENSION: ICD-10-CM

## 2020-01-01 DIAGNOSIS — E87.2 ACIDOSIS: ICD-10-CM

## 2020-01-01 DIAGNOSIS — N18.9 CHRONIC KIDNEY DISEASE, UNSPECIFIED: ICD-10-CM

## 2020-01-01 DIAGNOSIS — Z90.49 ACQUIRED ABSENCE OF OTHER SPECIFIED PARTS OF DIGESTIVE TRACT: Chronic | ICD-10-CM

## 2020-01-01 DIAGNOSIS — E78.5 HYPERLIPIDEMIA, UNSPECIFIED: ICD-10-CM

## 2020-01-01 DIAGNOSIS — G62.9 POLYNEUROPATHY, UNSPECIFIED: ICD-10-CM

## 2020-01-01 DIAGNOSIS — Z96.0 PRESENCE OF UROGENITAL IMPLANTS: ICD-10-CM

## 2020-01-01 DIAGNOSIS — Z87.01 PERSONAL HISTORY OF PNEUMONIA (RECURRENT): ICD-10-CM

## 2020-01-01 DIAGNOSIS — A04.72 ENTEROCOLITIS DUE TO CLOSTRIDIUM DIFFICILE, NOT SPECIFIED AS RECURRENT: ICD-10-CM

## 2020-01-01 DIAGNOSIS — Z16.22 RESISTANCE TO VANCOMYCIN RELATED ANTIBIOTICS: ICD-10-CM

## 2020-01-01 DIAGNOSIS — A41.51 SEPSIS DUE TO ESCHERICHIA COLI [E. COLI]: ICD-10-CM

## 2020-01-01 DIAGNOSIS — I12.9 HYPERTENSIVE CHRONIC KIDNEY DISEASE WITH STAGE 1 THROUGH STAGE 4 CHRONIC KIDNEY DISEASE, OR UNSPECIFIED CHRONIC KIDNEY DISEASE: ICD-10-CM

## 2020-01-01 DIAGNOSIS — Z87.440 PERSONAL HISTORY OF URINARY (TRACT) INFECTIONS: ICD-10-CM

## 2020-01-01 DIAGNOSIS — N39.0 URINARY TRACT INFECTION, SITE NOT SPECIFIED: ICD-10-CM

## 2020-01-01 DIAGNOSIS — Z79.01 LONG TERM (CURRENT) USE OF ANTICOAGULANTS: ICD-10-CM

## 2020-01-01 LAB
-  AMIKACIN: SIGNIFICANT CHANGE UP
-  AMPICILLIN/SULBACTAM: SIGNIFICANT CHANGE UP
-  AMPICILLIN: SIGNIFICANT CHANGE UP
-  AZTREONAM: SIGNIFICANT CHANGE UP
-  CEFAZOLIN: SIGNIFICANT CHANGE UP
-  CEFEPIME: SIGNIFICANT CHANGE UP
-  CEFOXITIN: SIGNIFICANT CHANGE UP
-  CEFTRIAXONE: SIGNIFICANT CHANGE UP
-  CIPROFLOXACIN: SIGNIFICANT CHANGE UP
-  GENTAMICIN: SIGNIFICANT CHANGE UP
-  LEVOFLOXACIN: SIGNIFICANT CHANGE UP
-  MEROPENEM: SIGNIFICANT CHANGE UP
-  NITROFURANTOIN: SIGNIFICANT CHANGE UP
-  PIPERACILLIN/TAZOBACTAM: SIGNIFICANT CHANGE UP
-  TOBRAMYCIN: SIGNIFICANT CHANGE UP
-  TRIMETHOPRIM/SULFAMETHOXAZOLE: SIGNIFICANT CHANGE UP
ADD ON TEST-SPECIMEN IN LAB: SIGNIFICANT CHANGE UP
ALBUMIN SERPL ELPH-MCNC: 2.4 G/DL — LOW (ref 3.3–5)
ALP SERPL-CCNC: 111 U/L — SIGNIFICANT CHANGE UP (ref 40–120)
ALT FLD-CCNC: 45 U/L — SIGNIFICANT CHANGE UP (ref 12–78)
ANION GAP SERPL CALC-SCNC: 14 MMOL/L — SIGNIFICANT CHANGE UP (ref 5–17)
ANION GAP SERPL CALC-SCNC: 16 MMOL/L — SIGNIFICANT CHANGE UP (ref 5–17)
ANION GAP SERPL CALC-SCNC: 9 MMOL/L — SIGNIFICANT CHANGE UP (ref 5–17)
APPEARANCE UR: ABNORMAL
APPEARANCE UR: CLEAR — SIGNIFICANT CHANGE UP
APPEARANCE: ABNORMAL
APTT BLD: 29.7 SEC — SIGNIFICANT CHANGE UP (ref 27.5–35.5)
AST SERPL-CCNC: 69 U/L — HIGH (ref 15–37)
BACTERIA UR CULT: NORMAL
BACTERIA: ABNORMAL
BASOPHILS # BLD AUTO: 0 K/UL — SIGNIFICANT CHANGE UP (ref 0–0.2)
BASOPHILS NFR BLD AUTO: 0 % — SIGNIFICANT CHANGE UP (ref 0–2)
BILIRUB SERPL-MCNC: 0.7 MG/DL — SIGNIFICANT CHANGE UP (ref 0.2–1.2)
BILIRUB UR-MCNC: ABNORMAL
BILIRUB UR-MCNC: NEGATIVE — SIGNIFICANT CHANGE UP
BILIRUBIN URINE: NEGATIVE
BLOOD URINE: NORMAL
BUN SERPL-MCNC: 35 MG/DL — HIGH (ref 7–23)
BUN SERPL-MCNC: 36 MG/DL — HIGH (ref 7–23)
BUN SERPL-MCNC: 41 MG/DL — HIGH (ref 7–23)
C DIFF BY PCR RESULT: DETECTED
C DIFF TOX GENS STL QL NAA+PROBE: SIGNIFICANT CHANGE UP
CALCIUM SERPL-MCNC: 7.2 MG/DL — LOW (ref 8.5–10.1)
CALCIUM SERPL-MCNC: 7.9 MG/DL — LOW (ref 8.5–10.1)
CALCIUM SERPL-MCNC: 7.9 MG/DL — LOW (ref 8.5–10.1)
CHLORIDE SERPL-SCNC: 108 MMOL/L — SIGNIFICANT CHANGE UP (ref 96–108)
CHLORIDE SERPL-SCNC: 111 MMOL/L — HIGH (ref 96–108)
CHLORIDE SERPL-SCNC: 95 MMOL/L — LOW (ref 96–108)
CO2 SERPL-SCNC: 17 MMOL/L — LOW (ref 22–31)
CO2 SERPL-SCNC: 19 MMOL/L — LOW (ref 22–31)
CO2 SERPL-SCNC: 25 MMOL/L — SIGNIFICANT CHANGE UP (ref 22–31)
COLOR SPEC: ABNORMAL
COLOR SPEC: YELLOW — SIGNIFICANT CHANGE UP
COLOR: YELLOW
CREAT SERPL-MCNC: 1.72 MG/DL — HIGH (ref 0.5–1.3)
CREAT SERPL-MCNC: 2.06 MG/DL — HIGH (ref 0.5–1.3)
CREAT SERPL-MCNC: 2.22 MG/DL — HIGH (ref 0.5–1.3)
CULTURE RESULTS: SIGNIFICANT CHANGE UP
DIFF PNL FLD: ABNORMAL
DIFF PNL FLD: ABNORMAL
E COLI DNA BLD POS QL NAA+NON-PROBE: SIGNIFICANT CHANGE UP
ENTEROCOC DNA BLD POS QL NAA+NON-PROBE: SIGNIFICANT CHANGE UP
EOSINOPHIL # BLD AUTO: 0 K/UL — SIGNIFICANT CHANGE UP (ref 0–0.5)
EOSINOPHIL NFR BLD AUTO: 0 % — SIGNIFICANT CHANGE UP (ref 0–6)
GLUCOSE QUALITATIVE U: NEGATIVE
GLUCOSE SERPL-MCNC: 108 MG/DL — HIGH (ref 70–99)
GLUCOSE SERPL-MCNC: 139 MG/DL — HIGH (ref 70–99)
GLUCOSE SERPL-MCNC: 600 MG/DL — CRITICAL HIGH (ref 70–99)
GLUCOSE UR QL: NEGATIVE MG/DL — SIGNIFICANT CHANGE UP
GLUCOSE UR QL: NEGATIVE MG/DL — SIGNIFICANT CHANGE UP
GRAM STN FLD: SIGNIFICANT CHANGE UP
GRAM STN FLD: SIGNIFICANT CHANGE UP
HCT VFR BLD CALC: 36.1 % — LOW (ref 39–50)
HCT VFR BLD CALC: 37.9 % — LOW (ref 39–50)
HGB BLD-MCNC: 11 G/DL — LOW (ref 13–17)
HGB BLD-MCNC: 11.7 G/DL — LOW (ref 13–17)
HYALINE CASTS: 0 /LPF
INR BLD: 2.14 RATIO — HIGH (ref 0.88–1.16)
INR BLD: 2.18 RATIO — HIGH (ref 0.88–1.16)
KETONES UR-MCNC: ABNORMAL
KETONES UR-MCNC: NEGATIVE — SIGNIFICANT CHANGE UP
KETONES URINE: NEGATIVE
LACTATE SERPL-SCNC: 6.4 MMOL/L — CRITICAL HIGH (ref 0.7–2)
LACTATE SERPL-SCNC: 8.3 MMOL/L — CRITICAL HIGH (ref 0.7–2)
LACTATE SERPL-SCNC: 9 MMOL/L — CRITICAL HIGH (ref 0.7–2)
LEUKOCYTE ESTERASE UR-ACNC: ABNORMAL
LEUKOCYTE ESTERASE UR-ACNC: ABNORMAL
LEUKOCYTE ESTERASE URINE: ABNORMAL
LYMPHOCYTES # BLD AUTO: 0.49 K/UL — LOW (ref 1–3.3)
LYMPHOCYTES # BLD AUTO: 38 % — SIGNIFICANT CHANGE UP (ref 13–44)
MCHC RBC-ENTMCNC: 30 PG — SIGNIFICANT CHANGE UP (ref 27–34)
MCHC RBC-ENTMCNC: 30.3 PG — SIGNIFICANT CHANGE UP (ref 27–34)
MCHC RBC-ENTMCNC: 30.5 GM/DL — LOW (ref 32–36)
MCHC RBC-ENTMCNC: 30.9 GM/DL — LOW (ref 32–36)
MCV RBC AUTO: 97.2 FL — SIGNIFICANT CHANGE UP (ref 80–100)
MCV RBC AUTO: 99.4 FL — SIGNIFICANT CHANGE UP (ref 80–100)
METHOD TYPE: SIGNIFICANT CHANGE UP
METHOD TYPE: SIGNIFICANT CHANGE UP
MICROSCOPIC-UA: NORMAL
MONOCYTES # BLD AUTO: 0.05 K/UL — SIGNIFICANT CHANGE UP (ref 0–0.9)
MONOCYTES NFR BLD AUTO: 4 % — SIGNIFICANT CHANGE UP (ref 2–14)
NEUTROPHILS # BLD AUTO: 0.75 K/UL — LOW (ref 1.8–7.4)
NEUTROPHILS NFR BLD AUTO: 58 % — SIGNIFICANT CHANGE UP (ref 43–77)
NITRITE UR-MCNC: NEGATIVE — SIGNIFICANT CHANGE UP
NITRITE UR-MCNC: POSITIVE
NITRITE URINE: NEGATIVE
NRBC # BLD: SIGNIFICANT CHANGE UP /100 WBCS (ref 0–0)
ORGANISM # SPEC MICROSCOPIC CNT: SIGNIFICANT CHANGE UP
ORGANISM # SPEC MICROSCOPIC CNT: SIGNIFICANT CHANGE UP
PH UR: 5 — SIGNIFICANT CHANGE UP (ref 5–8)
PH UR: 7 — SIGNIFICANT CHANGE UP (ref 5–8)
PH URINE: 6.5
PLATELET # BLD AUTO: 111 K/UL — LOW (ref 150–400)
PLATELET # BLD AUTO: 122 K/UL — LOW (ref 150–400)
POTASSIUM SERPL-MCNC: 2.7 MMOL/L — CRITICAL LOW (ref 3.5–5.3)
POTASSIUM SERPL-MCNC: 2.7 MMOL/L — CRITICAL LOW (ref 3.5–5.3)
POTASSIUM SERPL-MCNC: 3.3 MMOL/L — LOW (ref 3.5–5.3)
POTASSIUM SERPL-SCNC: 2.7 MMOL/L — CRITICAL LOW (ref 3.5–5.3)
POTASSIUM SERPL-SCNC: 2.7 MMOL/L — CRITICAL LOW (ref 3.5–5.3)
POTASSIUM SERPL-SCNC: 3.3 MMOL/L — LOW (ref 3.5–5.3)
PROT SERPL-MCNC: 5.2 GM/DL — LOW (ref 6–8.3)
PROT UR-MCNC: 500 MG/DL
PROT UR-MCNC: NEGATIVE MG/DL — SIGNIFICANT CHANGE UP
PROTEIN URINE: NORMAL
PROTHROM AB SERPL-ACNC: 23.9 SEC — HIGH (ref 10.6–13.6)
PROTHROM AB SERPL-ACNC: 24.6 SEC — HIGH (ref 10.6–13.6)
RBC # BLD: 3.63 M/UL — LOW (ref 4.2–5.8)
RBC # BLD: 3.9 M/UL — LOW (ref 4.2–5.8)
RBC # FLD: 16.3 % — HIGH (ref 10.3–14.5)
RBC # FLD: 16.6 % — HIGH (ref 10.3–14.5)
RED BLOOD CELLS URINE: 3 /HPF
SARS-COV-2 IGG SERPL QL IA: NEGATIVE — SIGNIFICANT CHANGE UP
SARS-COV-2 IGM SERPL IA-ACNC: <0.1 INDEX — SIGNIFICANT CHANGE UP
SARS-COV-2 RNA SPEC QL NAA+PROBE: SIGNIFICANT CHANGE UP
SODIUM SERPL-SCNC: 128 MMOL/L — LOW (ref 135–145)
SODIUM SERPL-SCNC: 141 MMOL/L — SIGNIFICANT CHANGE UP (ref 135–145)
SODIUM SERPL-SCNC: 145 MMOL/L — SIGNIFICANT CHANGE UP (ref 135–145)
SP GR SPEC: 1 — LOW (ref 1.01–1.02)
SP GR SPEC: 1.01 — SIGNIFICANT CHANGE UP (ref 1.01–1.02)
SPECIFIC GRAVITY URINE: 1.01
SPECIMEN SOURCE: SIGNIFICANT CHANGE UP
SPECIMEN SOURCE: SIGNIFICANT CHANGE UP
SQUAMOUS EPITHELIAL CELLS: 0 /HPF
URINE CYTOLOGY: NORMAL
UROBILINOGEN FLD QL: 1 MG/DL
UROBILINOGEN FLD QL: NEGATIVE MG/DL — SIGNIFICANT CHANGE UP
UROBILINOGEN URINE: NORMAL
VANCOMYCIN TROUGH SERPL-MCNC: 14.5 UG/ML — SIGNIFICANT CHANGE UP (ref 10–20)
WBC # BLD: 1.3 K/UL — LOW (ref 3.8–10.5)
WBC # BLD: 33.07 K/UL — HIGH (ref 3.8–10.5)
WBC # FLD AUTO: 1.3 K/UL — LOW (ref 3.8–10.5)
WBC # FLD AUTO: 33.07 K/UL — HIGH (ref 3.8–10.5)
WHITE BLOOD CELLS URINE: 186 /HPF

## 2020-01-01 PROCEDURE — 51702 INSERT TEMP BLADDER CATH: CPT

## 2020-01-01 PROCEDURE — 51703 INSERT BLADDER CATH COMPLEX: CPT

## 2020-01-01 PROCEDURE — 80202 ASSAY OF VANCOMYCIN: CPT

## 2020-01-01 PROCEDURE — 93306 TTE W/DOPPLER COMPLETE: CPT

## 2020-01-01 PROCEDURE — 99292 CRITICAL CARE ADDL 30 MIN: CPT

## 2020-01-01 PROCEDURE — 74176 CT ABD & PELVIS W/O CONTRAST: CPT | Mod: 26

## 2020-01-01 PROCEDURE — 87086 URINE CULTURE/COLONY COUNT: CPT

## 2020-01-01 PROCEDURE — 80048 BASIC METABOLIC PNL TOTAL CA: CPT

## 2020-01-01 PROCEDURE — 81001 URINALYSIS AUTO W/SCOPE: CPT

## 2020-01-01 PROCEDURE — 51700 IRRIGATION OF BLADDER: CPT

## 2020-01-01 PROCEDURE — 80053 COMPREHEN METABOLIC PANEL: CPT

## 2020-01-01 PROCEDURE — 99284 EMERGENCY DEPT VISIT MOD MDM: CPT | Mod: 25

## 2020-01-01 PROCEDURE — 99291 CRITICAL CARE FIRST HOUR: CPT

## 2020-01-01 PROCEDURE — 87186 SC STD MICRODIL/AGAR DIL: CPT

## 2020-01-01 PROCEDURE — 71045 X-RAY EXAM CHEST 1 VIEW: CPT | Mod: 26

## 2020-01-01 PROCEDURE — 83605 ASSAY OF LACTIC ACID: CPT

## 2020-01-01 PROCEDURE — 93306 TTE W/DOPPLER COMPLETE: CPT | Mod: 26

## 2020-01-01 PROCEDURE — 84443 ASSAY THYROID STIM HORMONE: CPT

## 2020-01-01 PROCEDURE — 85610 PROTHROMBIN TIME: CPT

## 2020-01-01 PROCEDURE — 74176 CT ABD & PELVIS W/O CONTRAST: CPT

## 2020-01-01 PROCEDURE — P9045: CPT

## 2020-01-01 PROCEDURE — 36415 COLL VENOUS BLD VENIPUNCTURE: CPT

## 2020-01-01 PROCEDURE — 99213 OFFICE O/P EST LOW 20 MIN: CPT | Mod: 25

## 2020-01-01 PROCEDURE — 93010 ELECTROCARDIOGRAM REPORT: CPT

## 2020-01-01 PROCEDURE — 86769 SARS-COV-2 COVID-19 ANTIBODY: CPT

## 2020-01-01 PROCEDURE — 71045 X-RAY EXAM CHEST 1 VIEW: CPT

## 2020-01-01 PROCEDURE — 82962 GLUCOSE BLOOD TEST: CPT

## 2020-01-01 PROCEDURE — 99283 EMERGENCY DEPT VISIT LOW MDM: CPT

## 2020-01-01 PROCEDURE — 51798 US URINE CAPACITY MEASURE: CPT

## 2020-01-01 PROCEDURE — 85027 COMPLETE CBC AUTOMATED: CPT

## 2020-01-01 RX ORDER — POTASSIUM CHLORIDE 20 MEQ
40 PACKET (EA) ORAL ONCE
Refills: 0 | Status: COMPLETED | OUTPATIENT
Start: 2020-01-01 | End: 2020-01-01

## 2020-01-01 RX ORDER — SODIUM CHLORIDE 9 MG/ML
1000 INJECTION, SOLUTION INTRAVENOUS
Refills: 0 | Status: DISCONTINUED | OUTPATIENT
Start: 2020-01-01 | End: 2020-01-01

## 2020-01-01 RX ORDER — SODIUM CHLORIDE 9 MG/ML
1000 INJECTION INTRAMUSCULAR; INTRAVENOUS; SUBCUTANEOUS ONCE
Refills: 0 | Status: COMPLETED | OUTPATIENT
Start: 2020-01-01 | End: 2020-01-01

## 2020-01-01 RX ORDER — CEPHALEXIN 500 MG
1 CAPSULE ORAL
Qty: 20 | Refills: 0
Start: 2020-01-01 | End: 2020-01-01

## 2020-01-01 RX ORDER — LEVOTHYROXINE SODIUM 125 MCG
100 TABLET ORAL DAILY
Refills: 0 | Status: DISCONTINUED | OUTPATIENT
Start: 2020-01-01 | End: 2020-01-01

## 2020-01-01 RX ORDER — OXYBUTYNIN CHLORIDE 5 MG/1
5 TABLET ORAL
Qty: 90 | Refills: 3 | Status: ACTIVE | COMMUNITY
Start: 2020-01-01 | End: 1900-01-01

## 2020-01-01 RX ORDER — PREGABALIN 225 MG/1
1000 CAPSULE ORAL DAILY
Refills: 0 | Status: DISCONTINUED | OUTPATIENT
Start: 2020-01-01 | End: 2020-01-01

## 2020-01-01 RX ORDER — AZTREONAM 2 G
2000 VIAL (EA) INJECTION ONCE
Refills: 0 | Status: COMPLETED | OUTPATIENT
Start: 2020-01-01 | End: 2020-01-01

## 2020-01-01 RX ORDER — ALBUMIN HUMAN 25 %
250 VIAL (ML) INTRAVENOUS ONCE
Refills: 0 | Status: COMPLETED | OUTPATIENT
Start: 2020-01-01 | End: 2020-01-01

## 2020-01-01 RX ORDER — VANCOMYCIN HCL 1 G
125 VIAL (EA) INTRAVENOUS EVERY 6 HOURS
Refills: 0 | Status: DISCONTINUED | OUTPATIENT
Start: 2020-01-01 | End: 2020-01-01

## 2020-01-01 RX ORDER — NOREPINEPHRINE BITARTRATE/D5W 8 MG/250ML
0.05 PLASTIC BAG, INJECTION (ML) INTRAVENOUS
Qty: 16 | Refills: 0 | Status: DISCONTINUED | OUTPATIENT
Start: 2020-01-01 | End: 2020-01-01

## 2020-01-01 RX ORDER — SODIUM CHLORIDE 9 MG/ML
1000 INJECTION, SOLUTION INTRAVENOUS ONCE
Refills: 0 | Status: COMPLETED | OUTPATIENT
Start: 2020-01-01 | End: 2020-01-01

## 2020-01-01 RX ORDER — ATORVASTATIN CALCIUM 80 MG/1
20 TABLET, FILM COATED ORAL AT BEDTIME
Refills: 0 | Status: DISCONTINUED | OUTPATIENT
Start: 2020-01-01 | End: 2020-01-01

## 2020-01-01 RX ORDER — ACETAMINOPHEN 500 MG
650 TABLET ORAL EVERY 6 HOURS
Refills: 0 | Status: DISCONTINUED | OUTPATIENT
Start: 2020-01-01 | End: 2020-01-01

## 2020-01-01 RX ORDER — ATORVASTATIN CALCIUM 80 MG/1
1 TABLET, FILM COATED ORAL
Qty: 0 | Refills: 0 | DISCHARGE

## 2020-01-01 RX ORDER — WARFARIN SODIUM 2.5 MG/1
2 TABLET ORAL
Qty: 0 | Refills: 0 | DISCHARGE

## 2020-01-01 RX ORDER — LEVOTHYROXINE SODIUM 125 MCG
1 TABLET ORAL
Qty: 0 | Refills: 0 | DISCHARGE

## 2020-01-01 RX ORDER — POTASSIUM CITRATE 10 MEQ/1
10 MEQ TABLET, EXTENDED RELEASE ORAL
Qty: 180 | Refills: 1 | Status: ACTIVE | COMMUNITY
Start: 2020-01-01 | End: 1900-01-01

## 2020-01-01 RX ORDER — PANTOPRAZOLE SODIUM 20 MG/1
8 TABLET, DELAYED RELEASE ORAL
Qty: 80 | Refills: 0 | Status: DISCONTINUED | OUTPATIENT
Start: 2020-01-01 | End: 2020-01-01

## 2020-01-01 RX ORDER — FUROSEMIDE 40 MG
1 TABLET ORAL
Qty: 0 | Refills: 0 | DISCHARGE

## 2020-01-01 RX ORDER — CEFEPIME 1 G/1
2000 INJECTION, POWDER, FOR SOLUTION INTRAMUSCULAR; INTRAVENOUS EVERY 12 HOURS
Refills: 0 | Status: DISCONTINUED | OUTPATIENT
Start: 2020-01-01 | End: 2020-01-01

## 2020-01-01 RX ORDER — PHENYLEPHRINE HYDROCHLORIDE 10 MG/ML
0.2 INJECTION INTRAVENOUS
Qty: 160 | Refills: 0 | Status: DISCONTINUED | OUTPATIENT
Start: 2020-01-01 | End: 2020-01-01

## 2020-01-01 RX ORDER — MIDODRINE HYDROCHLORIDE 2.5 MG/1
10 TABLET ORAL EVERY 8 HOURS
Refills: 0 | Status: DISCONTINUED | OUTPATIENT
Start: 2020-01-01 | End: 2020-01-01

## 2020-01-01 RX ORDER — SODIUM CHLORIDE 9 MG/ML
500 INJECTION INTRAMUSCULAR; INTRAVENOUS; SUBCUTANEOUS ONCE
Refills: 0 | Status: COMPLETED | OUTPATIENT
Start: 2020-01-01 | End: 2020-01-01

## 2020-01-01 RX ORDER — FOLIC ACID 0.8 MG
1 TABLET ORAL DAILY
Refills: 0 | Status: DISCONTINUED | OUTPATIENT
Start: 2020-01-01 | End: 2020-01-01

## 2020-01-01 RX ORDER — POTASSIUM CHLORIDE 20 MEQ
2 PACKET (EA) ORAL
Qty: 0 | Refills: 0 | DISCHARGE

## 2020-01-01 RX ORDER — NOREPINEPHRINE BITARTRATE/D5W 8 MG/250ML
0.05 PLASTIC BAG, INJECTION (ML) INTRAVENOUS
Qty: 8 | Refills: 0 | Status: DISCONTINUED | OUTPATIENT
Start: 2020-01-01 | End: 2020-01-01

## 2020-01-01 RX ORDER — INFLUENZA VIRUS VACCINE 15; 15; 15; 15 UG/.5ML; UG/.5ML; UG/.5ML; UG/.5ML
0.5 SUSPENSION INTRAMUSCULAR ONCE
Refills: 0 | Status: DISCONTINUED | OUTPATIENT
Start: 2020-01-01 | End: 2020-01-01

## 2020-01-01 RX ORDER — OXYBUTYNIN CHLORIDE 5 MG
5 TABLET ORAL AT BEDTIME
Refills: 0 | Status: DISCONTINUED | OUTPATIENT
Start: 2020-01-01 | End: 2020-01-01

## 2020-01-01 RX ORDER — ONDANSETRON 8 MG/1
4 TABLET, FILM COATED ORAL ONCE
Refills: 0 | Status: COMPLETED | OUTPATIENT
Start: 2020-01-01 | End: 2020-01-01

## 2020-01-01 RX ORDER — VASOPRESSIN 20 [USP'U]/ML
0.04 INJECTION INTRAVENOUS
Qty: 50 | Refills: 0 | Status: DISCONTINUED | OUTPATIENT
Start: 2020-01-01 | End: 2020-01-01

## 2020-01-01 RX ORDER — ALLOPURINOL 100 MG/1
100 TABLET ORAL DAILY
Qty: 90 | Refills: 3 | Status: ACTIVE | COMMUNITY
Start: 2020-01-01 | End: 1900-01-01

## 2020-01-01 RX ORDER — POTASSIUM CHLORIDE 20 MEQ
10 PACKET (EA) ORAL
Refills: 0 | Status: DISCONTINUED | OUTPATIENT
Start: 2020-01-01 | End: 2020-01-01

## 2020-01-01 RX ORDER — HYDROCHLOROTHIAZIDE 12.5 MG/1
12.5 TABLET ORAL
Qty: 90 | Refills: 3 | Status: ACTIVE | COMMUNITY
Start: 2020-01-01 | End: 1900-01-01

## 2020-01-01 RX ORDER — VANCOMYCIN HCL 1 G
1500 VIAL (EA) INTRAVENOUS ONCE
Refills: 0 | Status: COMPLETED | OUTPATIENT
Start: 2020-01-01 | End: 2020-01-01

## 2020-01-01 RX ORDER — AMIODARONE HYDROCHLORIDE 400 MG/1
200 TABLET ORAL DAILY
Refills: 0 | Status: DISCONTINUED | OUTPATIENT
Start: 2020-01-01 | End: 2020-01-01

## 2020-01-01 RX ORDER — OXYBUTYNIN CHLORIDE 5 MG
1 TABLET ORAL
Qty: 0 | Refills: 0 | DISCHARGE

## 2020-01-01 RX ORDER — PREGABALIN 225 MG/1
1 CAPSULE ORAL
Qty: 0 | Refills: 0 | DISCHARGE

## 2020-01-01 RX ORDER — CEPHALEXIN 500 MG
500 CAPSULE ORAL ONCE
Refills: 0 | Status: COMPLETED | OUTPATIENT
Start: 2020-01-01 | End: 2020-01-01

## 2020-01-01 RX ORDER — VANCOMYCIN HCL 1 G
1000 VIAL (EA) INTRAVENOUS ONCE
Refills: 0 | Status: DISCONTINUED | OUTPATIENT
Start: 2020-01-01 | End: 2020-01-01

## 2020-01-01 RX ORDER — DOCUSATE SODIUM 100 MG
1 CAPSULE ORAL
Qty: 0 | Refills: 0 | DISCHARGE

## 2020-01-01 RX ADMIN — Medication 125 MILLIGRAM(S): at 08:16

## 2020-01-01 RX ADMIN — CEFEPIME 100 MILLIGRAM(S): 1 INJECTION, POWDER, FOR SOLUTION INTRAMUSCULAR; INTRAVENOUS at 10:49

## 2020-01-01 RX ADMIN — SODIUM CHLORIDE 250 MILLILITER(S): 9 INJECTION, SOLUTION INTRAVENOUS at 16:09

## 2020-01-01 RX ADMIN — Medication 9.78 MICROGRAM(S)/KG/MIN: at 02:28

## 2020-01-01 RX ADMIN — Medication 125 MILLILITER(S): at 17:28

## 2020-01-01 RX ADMIN — SODIUM CHLORIDE 125 MILLILITER(S): 9 INJECTION, SOLUTION INTRAVENOUS at 23:51

## 2020-01-01 RX ADMIN — SODIUM CHLORIDE 125 MILLILITER(S): 9 INJECTION, SOLUTION INTRAVENOUS at 08:30

## 2020-01-01 RX ADMIN — Medication 9.78 MICROGRAM(S)/KG/MIN: at 19:49

## 2020-01-01 RX ADMIN — Medication 300 MILLIGRAM(S): at 13:35

## 2020-01-01 RX ADMIN — SODIUM CHLORIDE 1000 MILLILITER(S): 9 INJECTION INTRAMUSCULAR; INTRAVENOUS; SUBCUTANEOUS at 10:39

## 2020-01-01 RX ADMIN — Medication 2000 MILLIGRAM(S): at 13:35

## 2020-01-01 RX ADMIN — PHENYLEPHRINE HYDROCHLORIDE 3.91 MICROGRAM(S)/KG/MIN: 10 INJECTION INTRAVENOUS at 10:39

## 2020-01-01 RX ADMIN — Medication 650 MILLIGRAM(S): at 22:34

## 2020-01-01 RX ADMIN — ATORVASTATIN CALCIUM 20 MILLIGRAM(S): 80 TABLET, FILM COATED ORAL at 21:41

## 2020-01-01 RX ADMIN — SODIUM CHLORIDE 1000 MILLILITER(S): 9 INJECTION INTRAMUSCULAR; INTRAVENOUS; SUBCUTANEOUS at 00:56

## 2020-01-01 RX ADMIN — MIDODRINE HYDROCHLORIDE 10 MILLIGRAM(S): 2.5 TABLET ORAL at 05:00

## 2020-01-01 RX ADMIN — Medication 9.78 MICROGRAM(S)/KG/MIN: at 12:42

## 2020-01-01 RX ADMIN — Medication 40 MILLIEQUIVALENT(S): at 18:23

## 2020-01-01 RX ADMIN — Medication 9.78 MICROGRAM(S)/KG/MIN: at 16:09

## 2020-01-01 RX ADMIN — Medication 100 MILLIEQUIVALENT(S): at 13:59

## 2020-01-01 RX ADMIN — ONDANSETRON 4 MILLIGRAM(S): 8 TABLET, FILM COATED ORAL at 09:58

## 2020-01-01 RX ADMIN — VASOPRESSIN 2.4 UNIT(S)/MIN: 20 INJECTION INTRAVENOUS at 08:55

## 2020-01-01 RX ADMIN — Medication 1 MILLIGRAM(S): at 10:46

## 2020-01-01 RX ADMIN — SODIUM CHLORIDE 1000 MILLILITER(S): 9 INJECTION INTRAMUSCULAR; INTRAVENOUS; SUBCUTANEOUS at 08:54

## 2020-01-01 RX ADMIN — Medication 9.78 MICROGRAM(S)/KG/MIN: at 22:22

## 2020-01-01 RX ADMIN — Medication 5 MILLIGRAM(S): at 21:41

## 2020-01-01 RX ADMIN — Medication 100 MICROGRAM(S): at 05:00

## 2020-01-01 RX ADMIN — SODIUM CHLORIDE 1000 MILLILITER(S): 9 INJECTION INTRAMUSCULAR; INTRAVENOUS; SUBCUTANEOUS at 10:55

## 2020-01-01 RX ADMIN — SODIUM CHLORIDE 1000 MILLILITER(S): 9 INJECTION INTRAMUSCULAR; INTRAVENOUS; SUBCUTANEOUS at 09:45

## 2020-01-01 RX ADMIN — AMIODARONE HYDROCHLORIDE 200 MILLIGRAM(S): 400 TABLET ORAL at 10:46

## 2020-01-01 RX ADMIN — SODIUM CHLORIDE 1000 MILLILITER(S): 9 INJECTION, SOLUTION INTRAVENOUS at 13:35

## 2020-01-01 RX ADMIN — Medication 125 MILLIGRAM(S): at 01:07

## 2020-01-01 RX ADMIN — Medication 100 MILLIGRAM(S): at 12:20

## 2020-01-01 RX ADMIN — PREGABALIN 1000 MICROGRAM(S): 225 CAPSULE ORAL at 10:46

## 2020-01-01 RX ADMIN — Medication 650 MILLIGRAM(S): at 23:44

## 2020-01-01 RX ADMIN — Medication 9.78 MICROGRAM(S)/KG/MIN: at 00:56

## 2020-01-01 RX ADMIN — Medication 500 MILLIGRAM(S): at 02:06

## 2020-01-01 RX ADMIN — Medication 9.78 MICROGRAM(S)/KG/MIN: at 04:59

## 2020-01-01 RX ADMIN — Medication 4.89 MICROGRAM(S)/KG/MIN: at 08:54

## 2020-01-01 RX ADMIN — MIDODRINE HYDROCHLORIDE 10 MILLIGRAM(S): 2.5 TABLET ORAL at 21:41

## 2020-01-01 RX ADMIN — Medication 9.78 MICROGRAM(S)/KG/MIN: at 06:07

## 2020-01-01 RX ADMIN — Medication 4.89 MICROGRAM(S)/KG/MIN: at 07:11

## 2020-01-01 RX ADMIN — CEFEPIME 100 MILLIGRAM(S): 1 INJECTION, POWDER, FOR SOLUTION INTRAMUSCULAR; INTRAVENOUS at 21:41

## 2020-01-10 NOTE — HISTORY OF PRESENT ILLNESS
[FreeTextEntry1] : The patient was brought in by her son. He has been having increasingly difficulty urinating and his urinary residual today was 200 cc. The son wishes that a catheter because

## 2020-01-10 NOTE — ASSESSMENT
[FreeTextEntry1] : The patient appears to have a stricture and a 16 [place] coudé catheter was placed with drainage of urine. The patient will come back every 6 weeks or sooner with any problems for catheter replacement

## 2020-01-10 NOTE — PHYSICAL EXAM
[General Appearance - Well Developed] : well developed [General Appearance - Well Nourished] : well nourished [Normal Appearance] : normal appearance [General Appearance - In No Acute Distress] : no acute distress [Well Groomed] : well groomed [Abdomen Soft] : soft [Abdomen Tenderness] : non-tender [Costovertebral Angle Tenderness] : no ~M costovertebral angle tenderness [Scrotum] : the scrotum was normal [Urethral Meatus] : meatus normal [Testes Mass (___cm)] : there were no testicular masses [Urinary Bladder Findings] : the bladder was normal on palpation [Edema] : no peripheral edema [No Prostate Nodules] : no prostate nodules [Exaggerated Use Of Accessory Muscles For Inspiration] : no accessory muscle use [] : no respiratory distress [Respiration, Rhythm And Depth] : normal respiratory rhythm and effort [Not Anxious] : not anxious [Mood] : the mood was normal [Oriented To Time, Place, And Person] : oriented to person, place, and time [Affect] : the affect was normal [No Palpable Adenopathy] : no palpable adenopathy [Normal Station and Gait] : the gait and station were normal for the patient's age [No Focal Deficits] : no focal deficits

## 2020-05-12 NOTE — ED ADULT NURSE NOTE - CHIEF COMPLAINT QUOTE
pt presents to the ED complaining that his Obrien catheter is not draining, pt states he has the urge to urinate and is complaining of abdominal pain, skin tear noted to right forearm, per pt he was being transferred by his son and fell two days ago

## 2020-05-12 NOTE — ED PROVIDER NOTE - PATIENT PORTAL LINK FT
You can access the FollowMyHealth Patient Portal offered by [facility] by registering at the following website: [email or web address]. By joining [employer]’s FollowMyHealth portal, you will also be able to view your health information using other applications (apps) compatible with our system.

## 2020-05-12 NOTE — ED ADULT TRIAGE NOTE - CHIEF COMPLAINT QUOTE
pt presents to the ED complaining that his Obrien catheter is not draining, pt states he has the urge to urinate and is complaining of abdominal pain, skin tear noted to right forearm, per pt he was being transferred by his son and fell pt presents to the ED complaining that his Obrien catheter is not draining, pt states he has the urge to urinate and is complaining of abdominal pain, skin tear noted to right forearm, per pt he was being transferred by his son and fell two days ago

## 2020-05-12 NOTE — ED PROVIDER NOTE - OBJECTIVE STATEMENT
88 y/o male with a PMHx of pneumonia, C. diff, Afib, HLD, HTN, neuropathy, pacemaker, PVD, s/p b/l hip replacement, s/p cholecystectomy, presents to the ED BIBEMS for Obrien catheter discomfort. Pt reports he had an 18 F Obrien catheter placed "a while ago" by Dr. Ribeiro. Pt notes discomfort and burning to urethral area. Denies fever, chills, n/v/d. Pt states he feels like he may have pulled the catheter a little. Also reports 2 days ago his son was trying to get the pt out of bed and pt fell with a subsequent skin tear to his right forearm. No other complaints at this time. Allergies: Penicillin. PCP: Ceci Nunn.

## 2020-05-12 NOTE — ED ADULT NURSE NOTE - INTERVENTIONS DEFINITIONS
Reinforce activity limits and safety measures with patient and family/Non-slip footwear when patient is off stretcher/Monitor for mental status changes and reorient to person, place, and time/Stretcher in lowest position, wheels locked, appropriate side rails in place/Monitor gait and stability/Instruct patient to call for assistance/Physically safe environment: no spills, clutter or unnecessary equipment

## 2020-05-12 NOTE — ED ADULT NURSE NOTE - OBJECTIVE STATEMENT
BIBEMS from home complaining of burning and feeling the urge to urinate. Patient has chronic hsieh that he states is changed every time he goes to the urologists office, approximately every 1-2 weeks, but now states that the output has decreased. Bedside SONO shows an empty bladder and the proper baloon placement. Catheter tubing is full of sediment. Unable to flush current hsieh, will replace with same size fr. Patient states that it took 4 nurses and a doctor to replace it the last time. Safety maintained, needs attended, will continue to monitor.

## 2020-05-12 NOTE — ED PROVIDER NOTE - CARE PROVIDER_API CALL
Gerardo Ribeiro  Urology  [address] [place] [address]  Phone: [phone]  Fax: [phone]  Follow Up Time: 1-3 Days

## 2020-05-12 NOTE — ED ADULT NURSE REASSESSMENT NOTE - NS ED NURSE REASSESS COMMENT FT1
pt received in bed, alert and oriented x4. Urine draining from bag. Safety maintained. Pt changed, pt with soft brown stool. Sheets clean and dry at this time

## 2020-05-12 NOTE — ED PROVIDER NOTE - PROGRESS NOTE DETAILS
Obrien catheter replaced by Nursing staff.  Called pt's son and left a message to have him picked up from the ED.  Delay in discharge will be due to waiting for transportation as pt doesn't qualify for an ambulance.  Dimas Winston, DO

## 2020-05-12 NOTE — ED PROVIDER NOTE - GENITOURINARY, MLM
+indwelling 18 F Obrien catheter, yellow urine with sediments in Obrien bag. Bedside US shows no distention of bladder.

## 2020-05-13 NOTE — ED POST DISCHARGE NOTE - DETAILS
Culture + for proteus mirabilis. Sensitive to keflex which was prescribed upon discharge. - Black Ware PA-C

## 2020-11-17 PROBLEM — R33.9 RETENTION, URINE: Status: ACTIVE | Noted: 2020-01-01

## 2020-11-17 PROBLEM — N32.89 BLADDER SPASMS: Status: ACTIVE | Noted: 2020-01-01

## 2020-11-23 NOTE — ED PROVIDER NOTE - CARE PROVIDER_API CALL
Gerardo Ribeiro  UROLOGY  [address] [facility], [address]nd [place] [address]  Phone: [phone]  Fax: [phone]  Follow Up Time:

## 2020-11-23 NOTE — ED ADULT NURSE REASSESSMENT NOTE - NS ED NURSE REASSESS COMMENT FT1
Patient laying flat in bed, answering questions appropriately. no complaints. Patient more awake and alert. Fluids infusing. will start pressors if necessary per MD. Patient soiled bed with stool. patient turned and repositioned changed. Patient rectal temp 100.3. Patient has stage 2 pressure ulcer to coccyx cleaned and allevyn dressing applied. Patient has redness and excoriation to buttock.

## 2020-11-23 NOTE — ED ADULT NURSE REASSESSMENT NOTE - NS ED NURSE REASSESS COMMENT FT1
Patient reports feeling nausea, vomited x1. Patient looks pale, BP low, Iv being established. Unable to get blood work. Phlebotomist notified.

## 2020-11-23 NOTE — H&P ADULT - HISTORY OF PRESENT ILLNESS
S:    Pt seen and examined  HD # 1  FULL CODE  PMHx chronic hsieh, AFib, ppm on Amiodarone and Coumadin, HTN, HLD, C. diff infection, s/p b/l hip replacement, s/p cholecystectomy  Pt here for anuria from hsieh  Hsieh exchanged by [facility] OROURKE with difficulty  Post new hsieh became hypotensive, sick requiring resuscitation, vasopressors  + diarrhea for several days, Hx of c.diff in recent past    11/23 AM: Pt seen and examined. On levophed via PIV. c/o of back pain. Hsieh with bloody output (traumatic insertion).

## 2020-11-23 NOTE — CHART NOTE - NSCHARTNOTEFT_GEN_A_CORE
Pt reevaluated  Still appears ill, but better  Improved mentation    BMP grossly abnormal, suspect drawn proximal from cefepime running in D5 rider; rechecked, much more normal    BP improving, coming down on pressor dose  3 PIV noted    POCUS done; very difficult windows, could not assess contractility  From visible IVC small with > 50% respiratory variance  No B lines lungs B/L    s/p 30cc/kg IVF  Giving 250cc hourly, LR over 4 hours now for another liter; after, run at 125cc/hr overnight  Give 250cc 5% albumin over 1 hour now  Start midodrine 10mg PO TID    Pt states several days of poor PO intake, malaise and body aches  Will swab for flu, send CPK    Continue care    TOTAL CC Time, NOT INCLUDING procedures: 65 minutes

## 2020-11-23 NOTE — CONSULT NOTE ADULT - SUBJECTIVE AND OBJECTIVE BOX
HPI:  89 yo male with PMH of AFib, ppm on Amiodarone and Coumadin, HTN, HLD, C. diff infection, s/p b/l hip replacement, s/p cholecystectomy, Pt also with hx of chronic hsieh catheter. Patient reports he gets monthly hsieh catheter changes by his nurse. He reports he recently had his hsieh changed but it was not draining. As per ER staff pt with hsieh but the hsieh was not draining, the hsieh was d/markos in the ER and a new hsieh catheter was attempted to be placed but a hsieh was unable to be placed due to pt's anatomy and some blood through the penis due to traumatic hsieh. Urology was called for difficult hsieh catheter placement.       PAST MEDICAL & SURGICAL HISTORY:  Pneumonia    H/O Clostridium difficile infection    Afib    HLD (hyperlipidemia)    HTN (hypertension)    Neuropathy    Pacemaker    PVD (peripheral vascular disease)    History of hip replacement, total, bilateral    S/P cholecystectomy        REVIEW OF SYSTEMS       All other review of systems neg, except as noted in HPI    MEDICATIONS  (STANDING):    MEDICATIONS  (PRN):      Allergies    penicillins (Unknown)    Intolerances        SOCIAL HISTORY:    FAMILY HISTORY:  FHx: cancer        Vital Signs Last 24 Hrs  T(C): 37.1 (23 Nov 2020 09:45), Max: 37.1 (23 Nov 2020 09:45)  T(F): 98.7 (23 Nov 2020 09:45), Max: 98.7 (23 Nov 2020 09:45)  HR: 62 (23 Nov 2020 09:55) (60 - 88)  BP: 115/99 (23 Nov 2020 09:55) (77/57 - 116/58)  BP(mean): 106 (23 Nov 2020 09:55) (64 - 106)  RR: 21 (23 Nov 2020 09:55) (17 - 30)  SpO2: 98% (23 Nov 2020 09:55) (97% - 98%)    PHYSICAL EXAM:       General: No distress, No anxiety  VITALS  T(C): 37.1 (11-23-20 @ 09:45), Max: 37.1 (11-23-20 @ 09:45)  HR: 62 (11-23-20 @ 09:55) (60 - 88)  BP: 115/99 (11-23-20 @ 09:55) (77/57 - 116/58)  RR: 21 (11-23-20 @ 09:55) (17 - 30)  SpO2: 98% (11-23-20 @ 09:55) (97% - 98%)            Skin     : No jaundice  HEENT: No icterus , EOM full , No epistaxis  Lung    : no resp distress  Abdo:   : Soft, Non tender, No guarding, No distension    Back    : No CVAT  Extremity: No calf tenderness   Genitalia Male: No Hsieh, Pt with phimosis and urethra not visible. some blood noted from the penis. No scrotal swelling, No testicular swelling. No erythema, No crepitation, No induration

## 2020-11-23 NOTE — ED PROVIDER NOTE - PATIENT PORTAL LINK FT
You can access the FollowMyHealth Patient Portal offered by [facility] by registering at the following website: [email or web address]. By joining [employer]’s FollowMyHealth portal, you will also be able to view your health information using other applications (apps) compatible with our system. You can access the FollowMyHealth Patient Portal offered by [facility] by registering at the following website: [email or web address]. By joining [facility]’s FollowMyHealth portal, you will also be able to view your health information using other applications (apps) compatible with our system.

## 2020-11-23 NOTE — ED PROVIDER NOTE - CARE PLAN
Principal Discharge DX:	Problem with Obrien catheter, initial encounter   Principal Discharge DX:	Shock  Secondary Diagnosis:	Pyelonephritis

## 2020-11-23 NOTE — ED ADULT NURSE REASSESSMENT NOTE - NS ED NURSE REASSESS COMMENT FT1
Patient lethargic, answering questions but sleepy. Patient BP low Md landry at bedside, IV placed in L wrist #20, fluids infusing. Patient placed flat in bed. Non rebreather placed

## 2020-11-23 NOTE — H&P ADULT - NSHPPHYSICALEXAM_GEN_ALL_CORE
O:    Elderly M lying in bed  No JVD trachea midline + oxygen mask in place  S1S2+ irregular vrate ~ 80's no M/R/R  Diminished BS B/L  Abd softly distended, NT throughout  1+ edema B/L LE  Awake and alert, agitated  + chronic skin changes B/L LE

## 2020-11-23 NOTE — ED ADULT NURSE REASSESSMENT NOTE - NS ED NURSE REASSESS COMMENT FT1
pt taken to CT via stretcher, with 2 RN's. pt on zoll monitor. pt transported to ICU from CT with ICU RN and transport. pt safety maintained.

## 2020-11-23 NOTE — SEPSIS NOTE - ASSESSMENT
Septic shock, suspect 2/2 UTI    s/p 30cc/kg IVF bolus  On vasopressors  Broad spectrum abx    Pending workup

## 2020-11-23 NOTE — H&P ADULT - ASSESSMENT
A:    89yMale  HD # 1  FULL CODE    Here for:    1. Septic shock, suspect 2/2  2. UTI  3. FRANCINE on CKD  4. Urinary retention with chronic indwelling hsieh  5. Hypokalemia, acute on chronic  6. NAGMA 2/2 lactic acidosis  7. Diarrhea, Hx of c.diff    This patient requires critical care for support of one or more vital organ systems with a high probability of imminent or life threatening deterioration in his/her condition    P:    Septic shock, suspect 2/2 UTI.  Diarrhea, Hx of c.diff.    Avoid deleriogenic meds.  HD monitoring. Vasopressor with levophed, titrate to maintain MAP > 65. If requires 2nd pressor or escalating dose may require CVC. TTE, no echo in chart to assess EF, valve kinetics. Hold beta blocker given shock state. In AF, rate controlled. Continue amiodarone 200mg PO qd.  IS, OOb as able. PRN oxygen. f/u CXR. Deescalate oxygen therapy as tolerated.  NPO + meds. CT A/P to evaluate for intraabdominal pathology; s/p hsieh replacement with shock state; concern for hydronephrosis. Also with diarrhea, Hx of c.diff, LA ~ 6; abd exam some distention but soft, will evaluate for s/s colitis.   Broad spectrum abx, Hx of PCN allergy. Hx of pseudomonas UTI Tx with cefepime in past, which he tolerated well. Given shock state, will use higher dosing, renally dosed for CrCl < 30; cefepime 2g IV BID. s/p vancomycin 1.5g IV x 1. C.diff PCR sent, pending. f/u CT, urine studies, UC, BC. Leukopenic but ANC > 500.  VTE ppx SCD only given bloody output from hsieh, + coumadin; INR 2.14. GI ppx protonix 40mg iv QD.   FRANCINE on CKD. Came in anuric with no output from hsieh s/p replacement, difficult and traumatic, now with bloody output. Pending CT to evaluate for hydronephrosis.   s/p 2L IVF bolus, will give another 1L IVF bolus to make 30 cc/kg IVF bolus, then continue with LR @ 250cc/hr x 4 more hours.  LA ~ 6, trend; interval LA checks ordered.   f/u BMP ordered.    Dispo: Cont critical care.    TOTAL CRITICAL CARE TIME: 45 minutes  (EXCLUSIVE of any non bundled procedures)    Note: This time spent INCLUDES time spent directly as this patient's bedside with evaluation, review of chart including review of laboratory and imaging studies, interpretation of vital signs and cardiac output measurements, any necessary ventilator management, and time spent discussing plan of care with patient and family, including goals of care discussion.

## 2020-11-23 NOTE — ED PROVIDER NOTE - NS ED ROS FT
Review of Systems:  	•	CONSTITUTIONAL: no fever  	•	SKIN: no rash  	•	RESPIRATORY: no shortness of breath  	•	CARDIAC: no chest pain  	•	GI:  abd pain, no nausea, no vomiting, no diarrhea  	•	GENITO-URINARY:  dysuria  	•	MUSCULOSKELETAL:  no back pain  	•	NEUROLOGIC: no weakness  	•	ALLERGY: no rhinorrhea  	•	PSYSCHIATRIC: appropriate concern about symptoms

## 2020-11-23 NOTE — ED PROVIDER NOTE - OBJECTIVE STATEMENT
Pertinent HPI/PMH/PSH/FHx/SHx and Review of Systems contained within  HPI:  Patient p/w CC  penile pain at site of chronic hsieh, acute on chronic. patient has chronic Hsieh in place, last changed last Tuesday.  per son, no urinary output overnight.  patient c/o intermittent sharp lower abdominal pain but not currently  PMH/PSH relevant for: AFib, ppm on Amiodarone and Coumadin, HTN, HLD, C. diff infection, s/p b/l hip replacement, s/p cholecystectomy,  ROS negative for: fever, Chest pain, SOB, Nausea, vomiting, diarrhea,   FamilyHx and SocialHx not otherwise contributory

## 2020-11-23 NOTE — PROCEDURE NOTE - NSURITECHNIQUE_GU_A_CORE
The site was cleaned with soap/water and sterile solution (betadine)/The urinary drainage system is closed at the end of the procedure/Proper hand hygiene was performed/Sterile gloves were worn for the duration of the procedure/A sterile drape was used to cover all adjacent areas/The catheter was appropriately lubricated/The catheter was secured with a securement device (e.g. StatLock)/The collection bag is below the level of the patient and urinary bladder

## 2020-11-23 NOTE — ED PROVIDER NOTE - PHYSICAL EXAMINATION
*GEN: No acute distress, well appearing   *HEAD: Normocephalic, Atraumatic  *EYES/NOSE: b/l Pupils symmetric & Reactive to ligth, EOMI b/l  *THROAT: airway patent, moist mucous membranes  *NECK: Neck supple  *PULMONARY: No Respiratory distress, symmetric b/l chest rise  *CARDIAC: s1s2, regular rhythm   *ABDOMEN:  Non Tender, Non Distended, soft, no guarding, no rebound, no masses 3  *: (chaperone by _SUSAN Quezada___) uncircumcised, no testicular mass nor ttp nor abnormal lie,   POCUS with 67 ml urine, hsieh baloon in bladder, tried to re-position hsieh cathetar but cannot irrigate hsieh  *BACK: no CVA tenderness, No midline vertebral tenderness to palpation   *EXTREMITIES: symmetric pulses, 2+ DP & radial pulses, no cyanosis, no edema   *SKIN: no rash, no bruising   *NEUROLOGIC: alert,  full active & passive ROM in all 4 extremities,   *PSYCH: appropriate concern about symptoms, pleasant

## 2020-11-23 NOTE — ED PROVIDER NOTE - NSFOLLOWUPINSTRUCTIONS_ED_ALL_ED_FT
FOLLOW UP WITH PMD & UROLOGIST  Dr Ribeiro WITHIN 1-2DAYS, CALL TO MAKE APPOINTMENT  COME BACK TO ED IF YOUR CONDITION WORSENS OR IF YOU DEVELOP FEVER GREATER THAN 100.4F, CHEST PAIN,  SHORTNESS OF BREATH OR ANY OTHER SYMPTOMS CONCERNING TO YOU  TAKE TYLENOL (ACETAMINOPHEN) 650 MG EVERY 6 HOURS AS NEEDED FOR PAIN    Urinary Retention in Men    WHAT YOU NEED TO KNOW:    What is urinary retention? Urinary retention is a condition that develops when your bladder does not empty completely when you urinate.         What causes urinary retention?     An enlarged prostate      Blockages, such as a stone, growth, or narrowing of your urethra      A weak bladder muscle      Nerve damage from diabetes, stroke, or spinal cord injury      Bladder diverticula, which are pockets of urine that form in your bladder and do not empty      Certain medicines, such as narcotics, antihistamines, or antidepressants    What are the signs and symptoms of urinary retention?     Frequent urination, or the urge to urinate right after you finish      An urge to urinate, but your urine does not come out or dribbles out slowly and weakly      Frequent urine leaks that happen during the day or while you sleep      Pain or pressure when you urinate      Pain or stiffness in your abdomen, lower back, hips, or upper thighs      Blood in your urine    How is urinary retention diagnosed? Your healthcare provider will ask about your health history and the medicines you take. He will press or tap on your lower abdomen. You may need any of the following tests:     A digital rectal exam is when healthcare providers carefully feel the size of your prostate.      A post void residual test will show how much urine is left in your bladder after you urinate. You will be asked to urinate and then healthcare providers will use a small ultrasound machine to check how much urine is left in your bladder.      Blood or urine tests may show infection or prostate specific antigen (PSA) levels. PSA may be elevated in prostate cancer.      An ultrasound uses sound waves to show pictures on a monitor. An ultrasound may be done to show bladder stones, infection, or other problems.      A CT scan, or CAT scan, is a type of x-ray that is taken of your prostate, kidneys, and bladder. The pictures may show what is causing your urinary retention. You may be given a dye before the pictures are taken to help healthcare providers see the pictures better. Tell the healthcare provider if you have ever had an allergic reaction to contrast dye.    How is urinary retention treated?     A Obrien catheter is a tube put into your bladder to drain urine into a bag. Keep the bag below your waist. This will prevent urine from flowing back into your bladder and causing an infection or other problems. Also, keep the tube free of kinks so the urine will drain properly. Do not pull on the catheter. This can cause pain and bleeding, and may cause the catheter to come out.       Medicines can help decrease the size of your prostate, fight infection, and help you urinate more easily.      Surgery may be needed to treat the condition that is causing your urinary retention.     When should I contact my healthcare provider?     You have a fever.      You have pain when you urinate.      You have blood in your urine.      You have problems with your catheter.      You have questions or concerns about your condition or care.    When should I seek immediate care or call 911?     You have severe abdominal pain.      You are breathing faster than usual.      Your heartbeat is faster than usual.      Your face, hands, feet, or ankles are swollen.     CARE AGREEMENT:    You have the right to help plan your care. Learn about your health condition and how it may be treated. Discuss treatment options with your healthcare providers to decide what care you want to receive. You always have the right to refuse treatment.

## 2020-11-23 NOTE — ED PROVIDER NOTE - CLINICAL SUMMARY MEDICAL DECISION MAKING FREE TEXT BOX
POCUS with 67 ml urine, hsieh baloon in bladder, tried to re-position hsieh cathetar but cannot irrigate hsieh. will replace hsieh & have pt f/u with his urologist dr hardin. UA will be likely chronically colonized & since pt is w/o fever n/v. pt also has hx of severe c diff. so will not check ua initial plan: POCUS with 67 ml urine, hsieh baloon in bladder, tried to re-position hsieh cathetar but cannot irrigate hsieh. will replace hsieh & have pt f/u with his urologist dr hardin. UA will be likely chronically colonized & since pt is w/o fever n/v. pt also has hx of severe c diff. so will not check ua

## 2020-11-23 NOTE — ED ADULT TRIAGE NOTE - CHIEF COMPLAINT QUOTE
patient brought in by EMS from home c/o urinary retention.  patient has chronic hsieh in place, last changed last tuesday.  per son, no urinary output overnight.  patient c/o intermittent sharp lower abdominal pain.

## 2020-11-23 NOTE — ED ADULT NURSE REASSESSMENT NOTE - NS ED NURSE REASSESS COMMENT FT1
report received from previous rn. color pale, skin warm and dry, respirations even and unlabored. patient mentating at baseline. levophed infusion infusing at 0.7. patient transported to CT scan, ICU RN transported patient to ICU.

## 2020-11-23 NOTE — ED ADULT NURSE REASSESSMENT NOTE - NS ED NURSE REASSESS COMMENT FT1
Pt refusing blood pressures, pt restless, continuously removing cardiac leads, BP cuff and pulse ox from self. pt to be admitted. attempting to do manual BP at this time

## 2020-11-23 NOTE — ED PROVIDER NOTE - PROGRESS NOTE DETAILS
gris: uro shirley rider dc pt gris: pt vomitting, pale appearing, will check labs & symptomatic tx gris: pt vomitting, pale appearing, low bp will check labs & symptomatic tx gris: persistent low bp despite 2L ivf, will start levophed, abx. spoke to mikaela odonnell of icu & will admit to dr farnsworth

## 2020-11-24 NOTE — CONSULT NOTE ADULT - SUBJECTIVE AND OBJECTIVE BOX
Patient is a 89y old  Male who presents with a chief complaint of Shock     HPI:      Pt seen and examined  HD # 1  FULL CODE  PMHx chronic hsieh, AFib, ppm on Amiodarone and Coumadin, HTN, HLD, C. diff infection, s/p b/l hip replacement, s/p cholecystectomy  Pt here for anuria from hsieh  Hsieh exchanged by [facility] with difficulty  Post new hsieh became hypotensive, sick requiring resuscitation, vasopressors  + diarrhea for several days, Hx of c.diff in recent past     AM: Pt seen and examined. On levophed via PIV. c/o of back pain. Hsieh with bloody output (traumatic insertion).  (2020 12:57)      PMH: as above  PSH: as above  Meds: per reconciliation sheet, noted below  MEDICATIONS  (STANDING):  aMIOdarone    Tablet 200 milliGRAM(s) Oral daily  atorvastatin 20 milliGRAM(s) Oral at bedtime  cefepime   IVPB 2000 milliGRAM(s) IV Intermittent every 12 hours  cyanocobalamin 1000 MICROGram(s) Oral daily  folic acid 1 milliGRAM(s) Oral daily  influenza   Vaccine 0.5 milliLiter(s) IntraMuscular once  lactated ringers. 1000 milliLiter(s) (125 mL/Hr) IV Continuous <Continuous>  lactated ringers. 1000 milliLiter(s) (250 mL/Hr) IV Continuous <Continuous>  levothyroxine 100 MICROGram(s) Oral daily  midodrine 10 milliGRAM(s) Oral every 8 hours  norepinephrine Infusion 0.05 MICROgram(s)/kG/Min (4.89 mL/Hr) IV Continuous <Continuous>  oxybutynin 5 milliGRAM(s) Oral at bedtime  phenylephrine    Infusion 0.2 MICROgram(s)/kG/Min (3.91 mL/Hr) IV Continuous <Continuous>  potassium chloride  10 mEq/100 mL IVPB 10 milliEquivalent(s) IV Intermittent every 1 hour  vancomycin    Solution 125 milliGRAM(s) Oral every 6 hours  vasopressin Infusion 0.04 Unit(s)/Min (2.4 mL/Hr) IV Continuous <Continuous>    MEDICATIONS  (PRN):  acetaminophen   Tablet .. 650 milliGRAM(s) Oral every 6 hours PRN Temp greater or equal to 38.5C (101.3F)    Allergies    penicillins (Unknown)    Intolerances      Social: no smoking, no alcohol, no illegal drugs; no recent travel, no exposure to TB  FAMILY HISTORY:  FHx: cancer      no history of premature cardiovascular disease in first degree relatives    ROS: the patient denies fever, no chills, no HA, no seizures, no dizziness, no sore throat, no nasal congestion, no blurry vision, no CP, no palpitations, no SOB, no cough, no abdominal pain, no diarrhea, no N/V, no dysuria, no leg pain, no claudication, no rash, no joint aches, no rectal pain or bleeding, no night sweats  All other systems reviewed and are negative    Vital Signs Last 24 Hrs  T(C): 37.9 (2020 10:00), Max: 39 (2020 21:30)  T(F): 100.2 (2020 10:00), Max: 102.2 (2020 21:30)  HR: 61 (2020 11:15) (56 - 92)  BP: 71/54 (2020 11:15) (59/46 - 152/135)  BP(mean): 58 (2020 11:15) (34 - 139)  RR: 22 (2020 11:15) (15 - 25)  SpO2: 94% (2020 11:15) (90% - 100%)  Daily Height in cm: 177.8 (2020 12:40)    Daily     PE:    Constitutional:  No acute distress  HEENT: NC/AT, EOMI, PERRLA, conjunctivae clear; ears and nose atraumatic; pharynx benign  Neck: supple; thyroid not palpable  Back: no tenderness  Respiratory: respiratory effort normal; clear to auscultation  Cardiovascular: S1S2 regular, no murmurs  Abdomen: soft, not tender, not distended, positive BS; no liver or spleen organomegaly  Genitourinary: no suprapubic tenderness  Lymphatic: no LN palpable  Musculoskeletal: no muscle tenderness, no joint swelling or tenderness  Extremities: no pedal edema  Neurological/ Psychiatric: AxOx3, judgement and insight normal; moving all extremities  Skin: no rashes; no palpable lesions    Labs: all available labs reviewed                        11.0   33.07 )-----------( 111      ( 2020 06:46 )             36.1     11-24    140  |  112<H>  |  48<H>  ----------------------------<  23<LL>  5.0   |  9<LL>  |  3.32<H>    Ca    7.6<L>      2020 10:28  Mg     2.3     11-23    TPro  x   /  Alb  2.0<L>  /  TBili  x   /  DBili  x   /  AST  x   /  ALT  x   /  AlkPhos  x   11-24     LIVER FUNCTIONS - ( 2020 10:28 )  Alb: 2.0 g/dL / Pro: x     / ALK PHOS: x     / ALT: x     / AST: x     / GGT: x           Urinalysis Basic - ( 2020 21:15 )    Color: Brown / Appearance: very cloudy / S.015 / pH: x  Gluc: x / Ketone: Trace  / Bili: Small / Urobili: 1 mg/dL   Blood: x / Protein: 500 mg/dL / Nitrite: Positive   Leuk Esterase: Small / RBC: >50 /HPF / WBC 3-5   Sq Epi: x / Non Sq Epi: Occasional / Bacteria: Many        Culture Results:   Growth in aerobic and anaerobic bottles: Gram Negative Rods and Gram  Positive Cocci in Pairs and Chains  "Due to technical problems, Proteus sp. will Not be reported as part of  the BCID panel until further notice"  ***Blood Panel PCR results on this specimen are available  approximately 3 hours after the Gram stain result.***  Gram stain, PCR, and/or culture results may not always  correspond due to difference in methodologies.  ************************************************************  This PCR assay was performed using [employer].  The following targets are tested for: Enterococcus,  vancomycin resistant enterococci, Listeria monocytogenes,  coagulase negative staphylococci, S. aureus,  methicillin resistant S. aureus, Streptococcus agalactiae  (Group B), S. pneumoniae, S. pyogenes (Group A),  Acinetobacter baumannii, Enterobacter cloacae, E. coli,  Klebsiella oxytoca, K. pneumoniae, Proteus sp.,  Serratia marcescens, Haemophilus influenzae,  Neisseria meningitidis, Pseudomonas aeruginosa, Candida  albicans, C. glabrata, C krusei, C parapsilosis,  C. tropicalis and the KPC resistance gene. ( @ 11:44)    COVID-19 PCR: NotDeteabiodun (20 @ 09:54)    Radiology: all available radiological tests reviewed    Advanced directives addressed: full resuscitation Patient is a 89y old  Male who presents with a chief complaint of Shock     HPI:  88 y/o Male with h/o AFib, PM on Amiodarone and coumadin, HTN, HLD, recent C. diff infection, s/p b/l hip replacement, s/p cholecystectomy, urinary retention with chronic hsieh was admitted on  for poor urinary output and hypotension. evaluation fever, chills, cough and chest congestion of preceding day; also notes loose stools since admission, along with pain on urination. The patient did not have any recent dental work or recent travel. Admission lab work significant for elevated lactate.    Pt seen and examined  HD # 1  FULL CODE  PMHx chronic hsieh, AFib, ppm on Amiodarone and Coumadin, HTN, HLD, C. diff infection, s/p b/l hip replacement, s/p cholecystectomy  Pt here for anuria from hsieh  Hsieh exchanged by [facility] with difficulty  Post new hsieh became hypotensive, sick requiring resuscitation, vasopressors  + diarrhea for several days, Hx of c.diff in recent past     AM: Pt seen and examined. On levophed via PIV. c/o of back pain. Hsieh with bloody output (traumatic insertion).  (2020 12:57)      PMH: as above  PSH: as above  Meds: per reconciliation sheet, noted below  MEDICATIONS  (STANDING):  aMIOdarone    Tablet 200 milliGRAM(s) Oral daily  atorvastatin 20 milliGRAM(s) Oral at bedtime  cefepime   IVPB 2000 milliGRAM(s) IV Intermittent every 12 hours  cyanocobalamin 1000 MICROGram(s) Oral daily  folic acid 1 milliGRAM(s) Oral daily  influenza   Vaccine 0.5 milliLiter(s) IntraMuscular once  lactated ringers. 1000 milliLiter(s) (125 mL/Hr) IV Continuous <Continuous>  lactated ringers. 1000 milliLiter(s) (250 mL/Hr) IV Continuous <Continuous>  levothyroxine 100 MICROGram(s) Oral daily  midodrine 10 milliGRAM(s) Oral every 8 hours  norepinephrine Infusion 0.05 MICROgram(s)/kG/Min (4.89 mL/Hr) IV Continuous <Continuous>  oxybutynin 5 milliGRAM(s) Oral at bedtime  phenylephrine    Infusion 0.2 MICROgram(s)/kG/Min (3.91 mL/Hr) IV Continuous <Continuous>  potassium chloride  10 mEq/100 mL IVPB 10 milliEquivalent(s) IV Intermittent every 1 hour  vancomycin    Solution 125 milliGRAM(s) Oral every 6 hours  vasopressin Infusion 0.04 Unit(s)/Min (2.4 mL/Hr) IV Continuous <Continuous>    MEDICATIONS  (PRN):  acetaminophen   Tablet .. 650 milliGRAM(s) Oral every 6 hours PRN Temp greater or equal to 38.5C (101.3F)    Allergies    penicillins (Unknown)    Intolerances      Social: no smoking, no alcohol, no illegal drugs; no recent travel, no exposure to TB  FAMILY HISTORY:  FHx: cancer      no history of premature cardiovascular disease in first degree relatives    ROS: the patient denies fever, no chills, no HA, no seizures, no dizziness, no sore throat, no nasal congestion, no blurry vision, no CP, no palpitations, no SOB, no cough, no abdominal pain, no diarrhea, no N/V, no dysuria, no leg pain, no claudication, no rash, no joint aches, no rectal pain or bleeding, no night sweats  All other systems reviewed and are negative    Vital Signs Last 24 Hrs  T(C): 37.9 (2020 10:00), Max: 39 (2020 21:30)  T(F): 100.2 (2020 10:00), Max: 102.2 (2020 21:30)  HR: 61 (2020 11:15) (56 - 92)  BP: 71/54 (2020 11:15) (59/46 - 152/135)  BP(mean): 58 (2020 11:15) (34 - 139)  RR: 22 (2020 11:15) (15 - 25)  SpO2: 94% (2020 11:15) (90% - 100%)  Daily Height in cm: 177.8 (2020 12:40)    Daily     PE:    Constitutional:  No acute distress  HEENT: NC/AT, EOMI, PERRLA, conjunctivae clear; ears and nose atraumatic; pharynx benign  Neck: supple; thyroid not palpable  Back: no tenderness  Respiratory: respiratory effort normal; clear to auscultation  Cardiovascular: S1S2 regular, no murmurs  Abdomen: soft, not tender, not distended, positive BS; no liver or spleen organomegaly  Genitourinary: no suprapubic tenderness  Lymphatic: no LN palpable  Musculoskeletal: no muscle tenderness, no joint swelling or tenderness  Extremities: no pedal edema  Neurological/ Psychiatric: AxOx3, judgement and insight normal; moving all extremities  Skin: no rashes; no palpable lesions    Labs: all available labs reviewed                        11.0   33.07 )-----------( 111      ( 2020 06:46 )             36.1         140  |  112<H>  |  48<H>  ----------------------------<  23<LL>  5.0   |  9<LL>  |  3.32<H>    Ca    7.6<L>      2020 10:28  Mg     2.3         TPro  x   /  Alb  2.0<L>  /  TBili  x   /  DBili  x   /  AST  x   /  ALT  x   /  AlkPhos  x        LIVER FUNCTIONS - ( 2020 10:28 )  Alb: 2.0 g/dL / Pro: x     / ALK PHOS: x     / ALT: x     / AST: x     / GGT: x           Urinalysis Basic - ( 2020 21:15 )    Color: Brown / Appearance: very cloudy / S.015 / pH: x  Gluc: x / Ketone: Trace  / Bili: Small / Urobili: 1 mg/dL   Blood: x / Protein: 500 mg/dL / Nitrite: Positive   Leuk Esterase: Small / RBC: >50 /HPF / WBC 3-5   Sq Epi: x / Non Sq Epi: Occasional / Bacteria: Many        Culture Results:   Growth in aerobic and anaerobic bottles: Gram Negative Rods and Gram  Positive Cocci in Pairs and Chains  "Due to technical problems, Proteus sp. will Not be reported as part of  the BCID panel until further notice"  ***Blood Panel PCR results on this specimen are available  approximately 3 hours after the Gram stain result.***  Gram stain, PCR, and/or culture results may not always  correspond due to difference in methodologies.  ************************************************************  This PCR assay was performed using [employer].  The following targets are tested for: Enterococcus,  vancomycin resistant enterococci, Listeria monocytogenes,  coagulase negative staphylococci, S. aureus,  methicillin resistant S. aureus, Streptococcus agalactiae  (Group B), S. pneumoniae, S. pyogenes (Group A),  Acinetobacter baumannii, Enterobacter cloacae, E. coli,  Klebsiella oxytoca, K. pneumoniae, Proteus sp.,  Serratia marcescens, Haemophilus influenzae,  Neisseria meningitidis, Pseudomonas aeruginosa, Candida  albicans, C. glabrata, C krusei, C parapsilosis,  C. tropicalis and the KPC resistance gene. ( @ 11:44)    COVID-19 PCR: NotDetec (20 @ 09:54)    Radiology: all available radiological tests reviewed    Advanced directives addressed: full resuscitation Patient is a 89y old  Male who presents with a chief complaint of Shock     HPI:  88 y/o Male with h/o AFib, PM on Amiodarone and coumadin, HTN, HLD, recent C. diff infection, s/p b/l hip replacement, s/p cholecystectomy, urinary retention with chronic hsieh was admitted on  for poor urinary output and hypotension. Hsieh exchanged by [facility] with difficulty. Post new hsieh became hypotensive, sick requiring resuscitation, vasopressors. Has diarrhea for several days. In ER he received cefepime and vancomycin PO.     PMH: as above  PSH: as above  Meds: per reconciliation sheet, noted below  MEDICATIONS  (STANDING):  aMIOdarone    Tablet 200 milliGRAM(s) Oral daily  atorvastatin 20 milliGRAM(s) Oral at bedtime  cefepime   IVPB 2000 milliGRAM(s) IV Intermittent every 12 hours  cyanocobalamin 1000 MICROGram(s) Oral daily  folic acid 1 milliGRAM(s) Oral daily  influenza   Vaccine 0.5 milliLiter(s) IntraMuscular once  lactated ringers. 1000 milliLiter(s) (125 mL/Hr) IV Continuous <Continuous>  lactated ringers. 1000 milliLiter(s) (250 mL/Hr) IV Continuous <Continuous>  levothyroxine 100 MICROGram(s) Oral daily  midodrine 10 milliGRAM(s) Oral every 8 hours  norepinephrine Infusion 0.05 MICROgram(s)/kG/Min (4.89 mL/Hr) IV Continuous <Continuous>  oxybutynin 5 milliGRAM(s) Oral at bedtime  phenylephrine    Infusion 0.2 MICROgram(s)/kG/Min (3.91 mL/Hr) IV Continuous <Continuous>  potassium chloride  10 mEq/100 mL IVPB 10 milliEquivalent(s) IV Intermittent every 1 hour  vancomycin    Solution 125 milliGRAM(s) Oral every 6 hours  vasopressin Infusion 0.04 Unit(s)/Min (2.4 mL/Hr) IV Continuous <Continuous>    MEDICATIONS  (PRN):  acetaminophen   Tablet .. 650 milliGRAM(s) Oral every 6 hours PRN Temp greater or equal to 38.5C (101.3F)    Allergies    penicillins (Unknown)    Intolerances      Social: no smoking, no alcohol, no illegal drugs; no recent travel, no exposure to TB  FAMILY HISTORY:  FHx: cancer      no history of premature cardiovascular disease in first degree relatives    ROS: the patient denies fever, no chills, no HA, no seizures, no dizziness, no sore throat, no nasal congestion, no blurry vision, no CP, no palpitations, no SOB, no cough, no abdominal pain, no diarrhea, no N/V, no dysuria, no leg pain, no claudication, no rash, no joint aches, no rectal pain or bleeding, no night sweats  All other systems reviewed and are negative    Vital Signs Last 24 Hrs  T(C): 37.9 (2020 10:00), Max: 39 (2020 21:30)  T(F): 100.2 (2020 10:00), Max: 102.2 (2020 21:30)  HR: 61 (2020 11:15) (56 - 92)  BP: 71/54 (2020 11:15) (59/46 - 152/135)  BP(mean): 58 (2020 11:15) (34 - 139)  RR: 22 (2020 11:15) (15 - 25)  SpO2: 94% (2020 11:15) (90% - 100%)  Daily Height in cm: 177.8 (2020 12:40)    Daily     PE:      Labs: all available labs reviewed                        11.0   33.07 )-----------( 111      ( 2020 06:46 )             36.1     11-24    140  |  112<H>  |  48<H>  ----------------------------<  23<LL>  5.0   |  9<LL>  |  3.32<H>    Ca    7.6<L>      2020 10:28  Mg     2.3     -    TPro  x   /  Alb  2.0<L>  /  TBili  x   /  DBili  x   /  AST  x   /  ALT  x   /  AlkPhos  x   24     LIVER FUNCTIONS - ( 2020 10:28 )  Alb: 2.0 g/dL / Pro: x     / ALK PHOS: x     / ALT: x     / AST: x     / GGT: x           Urinalysis Basic - ( 2020 21:15 )    Color: Brown / Appearance: very cloudy / S.015 / pH: x  Gluc: x / Ketone: Trace  / Bili: Small / Urobili: 1 mg/dL   Blood: x / Protein: 500 mg/dL / Nitrite: Positive   Leuk Esterase: Small / RBC: >50 /HPF / WBC 3-5   Sq Epi: x / Non Sq Epi: Occasional / Bacteria: Many      Culture - Blood (collected 2020 11:44)  Source: .Blood None  Gram Stain (2020 02:47):    Growth in aerobic and anaerobic bottles: Gram Negative Rods and Gram    Positive Cocci in Pairs and Chains  Preliminary Report (2020 02:48):    Growth in aerobic and anaerobic bottles: Gram Negative Rods and Gram    Positive Cocci in Pairs and Chains  Organism: Blood Culture PCR (2020 02:51)      -  Escherichia coli: Detec      -  Vancomycin resistant Enterococcus sp.: Detec      Method Type: PCR    COVID-19 PCR: NotDetec (20 @ 09:54)    Radiology: all available radiological tests reviewed    < from: CT Abdomen and Pelvis No Cont (20 @ 14:39) >  Muralthickening involving the mid and distal sigmoid consistent with colitis. No evidence of bowel perforation or any collection.  Decompressed urinary bladder around the Hsieh catheter cannot be evaluated. Cannot assess for pyelonephritis without IV contrast.    < end of copied text >      Advanced directives addressed: full resuscitation

## 2020-11-24 NOTE — CONSULT NOTE ADULT - ASSESSMENT
89 year old Male with PMHx of AFib, ppm on Amiodarone and Coumadin, HTN, HLD, recent C. diff infection for which he finished 1 month course of PO Antibiotics treatment three weeks ago, admitted on 10/9 for evaluation fever, chills, cough and chest congestion of preceding day; also notes loose stools since admission, along with pain on urination. The patient did not have any recent dental work or recent travel. Admission lab work significant for elevated lactate.    1. Patient admitted with sepsis possibly due to CDiff colitis versus urinary origin given abnormal imaging and pyuria and patient complaints  - follow up cultures   - serial cbc and monitor temperature   - reviewed prior medical records to evaluate for resistant or atypical pathogens   - oxygen and nebs as needed   - iv hydration and supportive care   - on cefepime which can continue for now  - will continue vancomycin oral while on cefepime, given history of recent CDiff colitis  - cdiff toxin assay pending  - maintain contact isolation for now  2. other issues; per medicine 89 y/o Male with h/o AFib, PM on Amiodarone and coumadin, HTN, HLD, recent C. diff infection, s/p b/l hip replacement, s/p cholecystectomy, urinary retention with chronic hsieh was admitted on  for poor urinary output and hypotension. Hsieh exchanged by [facility] with difficulty. Post new hsieh became hypotensive, sick requiring resuscitation, vasopressors. Has diarrhea for several days. In ER he received cefepime and vancomycin PO.     The patient  as I was doing the evaluation.

## 2020-11-24 NOTE — PROGRESS NOTE ADULT - RESPIRATORY COMMENTS
"You can access the [facility], offered by [facility], by registering with the following website: [email or web address]"
clear

## 2020-11-24 NOTE — PROCEDURE NOTE - NSPROCDETAILS_GEN_ALL_CORE
sterile technique, indwelling urinary device inserted
sterile dressing applied/guidewire recovered/sterile technique, catheter placed/ultrasound guidance/lumen(s) aspirated and flushed

## 2020-11-24 NOTE — PROCEDURE NOTE - NSSITEPREP_SKIN_A_CORE
povidone-iodine ( under 2 weeks of age or 1500 grams)/Adherence to aseptic technique: hand hygiene prior to donning barriers (gown, gloves), don cap and mask, sterile drape over patient/chlorhexidine

## 2020-11-24 NOTE — PROGRESS NOTE ADULT - SUBJECTIVE AND OBJECTIVE BOX
assumed care of Patient this am    HPI:       89M with PMHx of chronic indwelling hsieh, UTI (proteus and pseudomonas), afib on AC, PPM, HTN, HLD, Cdiff, CKD who presented to ED with c/o lower abd pain with no output from hsieh. Hsieh found to be blocked changed by urology PA, Pt with subsequent low BP despite fluid resuscitation, lactic acidosis Placed on pressors.   ct shows colitis mural thickening in colon, patient C. dif positive, blood culture positive for E. Coli, VRE on antibiotics  low urine output patient with acute on chronic renal failure     MEDICATIONS  (STANDING):  aMIOdarone    Tablet 200 milliGRAM(s) Oral daily  atorvastatin 20 milliGRAM(s) Oral at bedtime  cefepime   IVPB 2000 milliGRAM(s) IV Intermittent every 12 hours  cyanocobalamin 1000 MICROGram(s) Oral daily  folic acid 1 milliGRAM(s) Oral daily  influenza   Vaccine 0.5 milliLiter(s) IntraMuscular once  lactated ringers. 1000 milliLiter(s) (125 mL/Hr) IV Continuous <Continuous>  lactated ringers. 1000 milliLiter(s) (250 mL/Hr) IV Continuous <Continuous>  levothyroxine 100 MICROGram(s) Oral daily  midodrine 10 milliGRAM(s) Oral every 8 hours  norepinephrine Infusion 0.05 MICROgram(s)/kG/Min (4.89 mL/Hr) IV Continuous <Continuous>  oxybutynin 5 milliGRAM(s) Oral at bedtime  potassium chloride  10 mEq/100 mL IVPB 10 milliEquivalent(s) IV Intermittent every 1 hour  vancomycin    Solution 125 milliGRAM(s) Oral every 6 hours  vasopressin Infusion 0.04 Unit(s)/Min (2.4 mL/Hr) IV Continuous <Continuous>    MEDICATIONS  (PRN):  acetaminophen   Tablet .. 650 milliGRAM(s) Oral every 6 hours PRN Temp greater or equal to 38.5C (101.3F)       Height (cm): 177.8 (11-23 @ 12:40)    ICU Vital Signs Last 24 Hrs  T(C): 37.7 (2020 05:30), Max: 39 (2020 21:30)  T(F): 99.8 (2020 05:30), Max: 102.2 (2020 21:30)  HR: 67 (2020 08:00) (60 - 92)  BP: 111/56 (2020 08:00) (54/32 - 152/135)  BP(mean): 69 (2020 08:00) (34 - 139)  ABP: --  ABP(mean): --  RR: 19 (2020 08:00) (15 - 30)  SpO2: 94% (2020 08:00) (90% - 100%)      I&O's Summary    2020 07:01  -  2020 07:00  --------------------------------------------------------  IN: 4306.2 mL / OUT: 195 mL / NET: 4111.2 mL                        11.0   33.07 )-----------( 111      ( 2020 06:46 )             36.1     11-23    141  |  108  |  41<H>  ----------------------------<  139<H>  3.3<L>   |  19<L>  |  2.22<H>    Ca    7.9<L>      2020 15:47  Mg     2.3         TPro  5.2<L>  /  Alb  2.4<L>  /  TBili  0.7  /  DBili  x   /  AST  69<H>  /  ALT  45  /  AlkPhos  111  23      Urinalysis Basic - ( 2020 21:15 )    Color: Brown / Appearance: very cloudy / S.015 / pH: x  Gluc: x / Ketone: Trace  / Bili: Small / Urobili: 1 mg/dL   Blood: x / Protein: 500 mg/dL / Nitrite: Positive   Leuk Esterase: Small / RBC: >50 /HPF / WBC 3-5   Sq Epi: x / Non Sq Epi: Occasional / Bacteria: Many    DVT Prophylaxis:  Increased INR                                                                Advanced Directives: will discuss

## 2020-11-24 NOTE — PROGRESS NOTE ADULT - SUBJECTIVE AND OBJECTIVE BOX
Patient is a 89y old  Male who presents with a chief complaint of Shock (2020 12:57)      BRIEF HOSPITAL COURSE: 89M with PMHx of chronic indwelling hsieh, UTI (proteus and pseudomonas), afib on AC, PPM, HTN, HLD, Cdiff, CKD who presented to ED with c/o lower abd pain with no output from hsieh. Hsieh found to be blocked changed by urology PA, Pt with subsequent low BP despite fluid resuscitation, lactemia. Placed on pressors. ICU consulted and admitted for further management.    Events last 24 hours: febrile to 102, remains on pressors, pt admits to diarrhea in last 24hours (~4 episodes), urine output is soft.     PAST MEDICAL & SURGICAL HISTORY:  Pneumonia    H/O Clostridium difficile infection    Afib    HLD (hyperlipidemia)    HTN (hypertension)    Neuropathy    Pacemaker    PVD (peripheral vascular disease)    History of hip replacement, total, bilateral    S/P cholecystectomy        Review of Systems:12 pt ROS negative unless otherwise stated above      Medications:  cefepime   IVPB 2000 milliGRAM(s) IV Intermittent every 12 hours  vancomycin    Solution 125 milliGRAM(s) Oral every 6 hours    aMIOdarone    Tablet 200 milliGRAM(s) Oral daily  midodrine 10 milliGRAM(s) Oral every 8 hours  norepinephrine Infusion 0.05 MICROgram(s)/kG/Min IV Continuous <Continuous>      acetaminophen   Tablet .. 650 milliGRAM(s) Oral every 6 hours PRN          oxybutynin 5 milliGRAM(s) Oral at bedtime    atorvastatin 20 milliGRAM(s) Oral at bedtime  levothyroxine 100 MICROGram(s) Oral daily    cyanocobalamin 1000 MICROGram(s) Oral daily  folic acid 1 milliGRAM(s) Oral daily  lactated ringers. 1000 milliLiter(s) IV Continuous <Continuous>  lactated ringers. 1000 milliLiter(s) IV Continuous <Continuous>  potassium chloride  10 mEq/100 mL IVPB 10 milliEquivalent(s) IV Intermittent every 1 hour    influenza   Vaccine 0.5 milliLiter(s) IntraMuscular once              ICU Vital Signs Last 24 Hrs  T(C): 38.7 (2020 00:10), Max: 39 (2020 21:30)  T(F): 101.7 (2020 00:10), Max: 102.2 (2020 21:30)  HR: 60 (2020 00:10) (60 - 92)  BP: 90/42 (2020 00:10) (54/32 - 152/135)  BP(mean): 50 (2020 00:10) (43 - 139)  ABP: --  ABP(mean): --  RR: 20 (2020 00:10) (15 - 30)  SpO2: 96% (2020 00:10) (90% - 100%)      I&O's Summary    2020 07:01  -  2020 00:25  --------------------------------------------------------  IN: 1500.2 mL / OUT: 190 mL / NET: 1310.2 mL        LABS:                        11.7   1.30  )-----------( 122      ( 2020 10:40 )             37.9         141  |  108  |  41<H>  ----------------------------<  139<H>  3.3<L>   |  19<L>  |  2.22<H>    Ca    7.9<L>      2020 15:47  Mg     2.3         TPro  5.2<L>  /  Alb  2.4<L>  /  TBili  0.7  /  DBili  x   /  AST  69<H>  /  ALT  45  /  AlkPhos  111            CAPILLARY BLOOD GLUCOSE        PT/INR - ( 2020 10:40 )   PT: 23.9 sec;   INR: 2.14 ratio         PTT - ( 2020 10:40 )  PTT:29.7 sec  Urinalysis Basic - ( 2020 21:15 )    Color: Brown / Appearance: very cloudy / S.015 / pH: x  Gluc: x / Ketone: Trace  / Bili: Small / Urobili: 1 mg/dL   Blood: x / Protein: 500 mg/dL / Nitrite: Positive   Leuk Esterase: Small / RBC: >50 /HPF / WBC 3-5   Sq Epi: x / Non Sq Epi: Occasional / Bacteria: Many      CULTURES:  C Diff by PCR Result: Detected ( @ 12:17)      Physical Examination:    General: No acute distress.  Alert, oriented, interactive, nonfocal    HEENT: Pupils equal, reactive to light.  Symmetric.    PULM: Diminished BS bilateral bases, no wheezing appreciated    CVS: Regular rate and rhythm    ABD: Softly distended, mild tenderness in mid lower quadrant, +BS, no rebound or guarding    EXT: No edema, nontender    SKIN: chronic LE stasis changes, + LE edema    RADIOLOGY:   EXAM:  CT ABDOMEN AND PELVIS                            PROCEDURE DATE:  2020          INTERPRETATION:  CLINICAL INFORMATION: Sepsis    COMPARISON: 2019.    PROCEDURE:  CT of the Abdomen and Pelvis was performed without intravenous contrast.  Intravenous contrast: None.  Oral contrast: None.  Sagittal and coronal reformats were performed.    FINDINGS:  LOWER CHEST: Pacemaker. Coronary artery calcification. Subsegmental atelectasis dependent lung bases.    LIVER: Within normal limits.  BILE DUCTS: Normal caliber.  GALLBLADDER: Cholecystectomy. Small calcifications along the inferior hepatic margin may represent dropped gallstones.  SPLEEN: Within normal limits.  PANCREAS: Within normal limits.  ADRENALS: Nonspecific asymmetric prominence of the right adrenal, stable.  KIDNEYS/URETERS: 9 cm sized exophytic cyst along the lateral aspect of left kidney. A few small cysts bilaterally. 1.3 cm sized hyperdense exophytic lesion lower pole of the right kidney. It may represent a hyperdense cyst but is not definitely characterized on this noncontrast enhanced study.    BLADDER: Decompressed around a Hsieh catheter.  REPRODUCTIVE ORGANS: No evidence of a mass or collection. Limited evaluation due to artifacts from bilateral hip joint prostheses.    BOWEL: No bowel obstruction. Appendix is normal. Evidence of mural thickening involving the mid and distal sigmoid, compatible with colitis. Etiology of the colitis is not determined by imaging. A few uncomplicated diverticula involving the proximal sigmoid.  PERITONEUM: No ascites.  VESSELS: Atherosclerotic changes  RETROPERITONEUM/LYMPH NODES: No lymphadenopathy.  ABDOMINAL WALL: Within normal limits.  BONES: Degenerative changes. Bilateral hip prostheses.    IMPRESSION:  Mural thickening involving the mid and distal sigmoid consistent with colitis. No evidence of bowel perforation or any collection.    Decompressed urinary bladder around the Hsieh catheter cannot be evaluated. Cannot assess for pyelonephritis without IV contrast.    Additional findings as above.              ADELINA STOUT MD; Attending Radiologist  This document has been electronically signed. 2020  3:36PM  EXAM:  XR CHEST PORTABLE URGENT 1V                            PROCEDURE DATE:  2020          INTERPRETATION:  AP chest on 2020 at 12:37 PM. Patient has hypotension.    Heart is magnified by technique. Left-sided pacemaker again noted.    Scirrhous area off left upper hilum again noted.    Chest is similar to 2017.    IMPRESSION: Pacemaker in scirrhous area off left upper hilum again noted.            ERIC BERNSTEIN MD; Attending Radiologist  This document has been electronically signed. 2020  2:19PM    CRITICAL CARE TIME SPENT: 35 mins assessing presenting problems of acute illness that poses high probability of life threatening deterioration or end organ damage/dysfunction.  Medical decision making including Initiating plan of care, reviewing data, reviewing radiology, direct patient bedside evaluation and interpretation of vital signs, discussion with multidisciplinary team, all non inclusive of procedures

## 2020-11-24 NOTE — PROGRESS NOTE ADULT - ASSESSMENT
Impression:  1. septic shock  2 acute UTI  3. C diff colitis  4. FRANCINE on CKD  5. metabolic acidosis    Plan:  Neuro - avoiding neurosuppressants, nonfocal currently    CV -  actively titrating pressors for MAP>60           midodrine for facilitation of pressor weaning           EKG ? Vpaced at 60bpm           cont amio            keep K~4 cautiously with FRANCINE and Mg>2 for optimal arrhythmia suppression           INR therapeutic, holding AC last night given bloody urine with hsieh insertion    Pulm - stable on NC, CXR WAD    GI -  CTA with evidence colitis, +diarrhea as per pt, +cdiff PCR, will start vanco PO empirically for now, no diarrhea noted overnight    Renal - Cr continues to rise, CT without hydro, U/A +, urine output is low, cont IV hydration with balanced fluids, avoid MAP<60, renally adjust all meds, avoid nephrotoxins, hsieh as              per urology, trend Cr, currently without acute indications for HD    Heme -  no signs of active bleeding, INR therapeutic, holding dose Coumadin overnight given bloody urine with hsieh, now improved, will restart coumadin tomorrow, INR check in am    ID - Empiric IV abx with Cefepime and Vanco, PO vanco added, f/U cxs, Abx adjustment based on clinical features and culture data    Endo -  Aggressive glycemic control to limit FS glucose to < 180mg/dl, BS currently stable, check TSH      
Patient admitted with fever, septic shock  bacteremic with VRE and ecoli  hx. utis and chronic hsieh but u/a only has 3-5 wbcs ,   c. dif positive, no peritonities on physical exam  continue abx.  pressor support  dec urine output , will continue hydration  vida secondary to sepsis, will monitor  on high dose levophed will add vasopresin

## 2020-11-25 LAB
-  AMIKACIN: SIGNIFICANT CHANGE UP
-  AMOXICILLIN/CLAVULANIC ACID: SIGNIFICANT CHANGE UP
-  AMPICILLIN/SULBACTAM: SIGNIFICANT CHANGE UP
-  AMPICILLIN: SIGNIFICANT CHANGE UP
-  AZTREONAM: SIGNIFICANT CHANGE UP
-  CEFAZOLIN: SIGNIFICANT CHANGE UP
-  CEFEPIME: SIGNIFICANT CHANGE UP
-  CEFOXITIN: SIGNIFICANT CHANGE UP
-  CEFTRIAXONE: SIGNIFICANT CHANGE UP
-  CIPROFLOXACIN: SIGNIFICANT CHANGE UP
-  DAPTOMYCIN: SIGNIFICANT CHANGE UP
-  ERTAPENEM: SIGNIFICANT CHANGE UP
-  GENTAMICIN: SIGNIFICANT CHANGE UP
-  IMIPENEM: SIGNIFICANT CHANGE UP
-  LEVOFLOXACIN: SIGNIFICANT CHANGE UP
-  LINEZOLID: SIGNIFICANT CHANGE UP
-  MEROPENEM: SIGNIFICANT CHANGE UP
-  NITROFURANTOIN: SIGNIFICANT CHANGE UP
-  NITROFURANTOIN: SIGNIFICANT CHANGE UP
-  PIPERACILLIN/TAZOBACTAM: SIGNIFICANT CHANGE UP
-  TETRACYCLINE: SIGNIFICANT CHANGE UP
-  TOBRAMYCIN: SIGNIFICANT CHANGE UP
-  TRIMETHOPRIM/SULFAMETHOXAZOLE: SIGNIFICANT CHANGE UP
-  VANCOMYCIN: SIGNIFICANT CHANGE UP
CULTURE RESULTS: SIGNIFICANT CHANGE UP
METHOD TYPE: SIGNIFICANT CHANGE UP
ORGANISM # SPEC MICROSCOPIC CNT: SIGNIFICANT CHANGE UP
SPECIMEN SOURCE: SIGNIFICANT CHANGE UP

## 2020-11-26 LAB
-  AMPICILLIN: SIGNIFICANT CHANGE UP
-  DAPTOMYCIN: SIGNIFICANT CHANGE UP
-  GENTAMICIN SYNERGY: SIGNIFICANT CHANGE UP
-  LINEZOLID: SIGNIFICANT CHANGE UP
-  VANCOMYCIN: SIGNIFICANT CHANGE UP
CULTURE RESULTS: SIGNIFICANT CHANGE UP
METHOD TYPE: SIGNIFICANT CHANGE UP
ORGANISM # SPEC MICROSCOPIC CNT: SIGNIFICANT CHANGE UP
SPECIMEN SOURCE: SIGNIFICANT CHANGE UP

## 2021-08-12 NOTE — SEPSIS NOTE - BP NONINVASIVE MEAN (MM HG)
Authorization was approved. Patient was informed  
PA has been started and is currently pending.  
74

## 2022-05-23 NOTE — ED ADULT NURSE REASSESSMENT NOTE - NS ED NURSE REASSESS COMMENT FT1
Dr. Lopez made aware of repeat lactate that came back higher then previous-additional bolus infusing
Pt becoming hypotensive-call placed to admit doctor John-message left on voicemail and doctor paged over head. ER PA made aware-additional bolus of 250ml ordered and hung. RN placed order for stat BNP.
23-May-2022 03:41

## 2022-10-11 NOTE — DISCHARGE NOTE PROVIDER - PROVIDER TOKENS
Patient Education    [facility] HANDOUT- PARENT  15 MONTH VISIT  Here are some suggestions from [employer] experts that may be of value to your family.     TALKING AND FEELING  Try to give choices. Allow your child to choose between 2 good options, such as a banana or an apple, or 2 favorite books.  Know that it is normal for your child to be anxious around new people. Be sure to comfort your child.  Take time for yourself and your partner.  Get support from other parents.  Show your child how to use words.  Use simple, clear phrases to talk to your child.  Use simple words to talk about a book s pictures when reading.  Use words to describe your child s feelings.  Describe your child s gestures with words.    TANTRUMS AND DISCIPLINE  Use distraction to stop tantrums when you can.  Praise your child when she does what you ask her to do and for what she can accomplish.  Set limits and use discipline to teach and protect your child, not to punish her.  Limit the need to say  No!  by making your home and yard safe for play.  Teach your child not to hit, bite, or hurt other people.  Be a role model.    A GOOD NIGHT S SLEEP  Put your child to bed at the same time every night. Early is better.  Make the hour before bedtime loving and calm.  Have a simple bedtime routine that includes a book.  Try to tuck in your child when he is drowsy but still awake.  Don t give your child a bottle in bed.  Don t put a TV, computer, tablet, or smartphone in your child s bedroom.  Avoid giving your child enjoyable attention if he wakes during the night. Use words to reassure and give a blanket or toy to hold for comfort.    HEALTHY TEETH  Take your child for a first dental visit if you have not done so.  Brush your child s teeth twice each day with a small smear of fluoridated toothpaste, no more than a grain of rice.  Wean your child from the bottle.  Brush your own teeth. Avoid sharing cups and spoons with your child. Don t  clean her pacifier in your mouth.    SAFETY  Make sure your child s car safety seat is rear facing until he reaches the highest weight or height allowed by the car safety seat s . In most cases, this will be well past the second birthday.  Never put your child in the front seat of a vehicle that has a passenger airbag. The back seat is the safest.  Everyone should wear a seat belt in the car.  Keep poisons, medicines, and lawn and cleaning supplies in locked cabinets, out of your child s sight and reach.  Put the Poison Help number into all phones, including cell phones. Call if you are worried your child has swallowed something harmful. Don t make your child vomit.  Place jenkins at the top and bottom of stairs. Install operable window guards on windows at the second story and higher. Keep furniture away from windows.  Turn pan handles toward the back of the stove.  Don t leave hot liquids on tables with tablecloths that your child might pull down.  Have working smoke and carbon monoxide alarms on every floor. Test them every month and change the batteries every year. Make a family escape plan in case of fire in your home.    WHAT TO EXPECT AT YOUR CHILD S 18 MONTH VISIT  We will talk about    Handling stranger anxiety, setting limits, and knowing when to start toilet training    Supporting your child s speech and ability to communicate    Talking, reading, and using tablets or smartphones with your child    Eating healthy    Keeping your child safe at home, outside, and in the car        Helpful Resources: Poison Help Line:  307.512.2924  Information About Car Safety Seats: www.safercar.gov/parents  Toll-free Auto Safety Hotline: 967.655.4791  Consistent with Bright Futures: Guidelines for Health Supervision of Infants, Children, and Adolescents, 4th Edition  For more information, go to https://brightfutures.aap.org.            PROVIDER:[TOKEN:[[id number]]],PROVIDER:[TOKEN:[[id number]]]

## 2022-11-11 NOTE — PROVIDER CONTACT NOTE (CRITICAL VALUE NOTIFICATION) - TEST AND RESULT REPORTED:
Silvia Crawford informed states SX are mild right now. She is going to call  patients daughter Elizabeth Conte to call office to schedule an appointment for Monday.  If SX get worse prior to appt they will take patient to UC or the ED lactate 8.3

## 2023-01-19 NOTE — PHYSICAL THERAPY INITIAL EVALUATION ADULT - MODALITIES TREATMENT COMMENTS
History/Exam/Medical decision making pt left seated in chair post Eval; chair alarm donned; L LE elevated on stool/pillow; callbell in reach; pt instructed not to get up alone; call nursing for assist; soco well; denied pain; RN Dionne made aware pt OOB

## 2023-04-21 NOTE — PHYSICAL THERAPY INITIAL EVALUATION ADULT - TRANSFER SKILLS, REHAB EVAL
Health Maintenance   Topic Date Due    Colorectal Cancer Screen  04/11/2023    DTaP/Tdap/Td vaccine (1 - Tdap) 03/08/2024 (Originally 9/9/1976)    Flu vaccine (Season Ended) 03/08/2024 (Originally 8/1/2023)    Pneumococcal 65+ years Vaccine (1 - PCV) 03/08/2024 (Originally 9/9/2022)    COVID-19 Vaccine (4 - Booster for Leanora Julian series) 03/08/2024 (Originally 2/11/2022)    Hepatitis C screen  03/08/2024 (Originally [birth year]1975)    HIV screen  03/08/2024 (Originally 9/9/1972)    Lipids  02/21/2024    A1C test (Diabetic or Prediabetic)  03/08/2024    Depression Screen  03/08/2024    Shingles vaccine  Completed    Orthopox (Smallpox/Monkeypox) Vaccine  Completed    Hepatitis A vaccine  Aged Out    Hib vaccine  Aged Out    Meningococcal (ACWY) vaccine  Aged Out    Pneumococcal 0-64 years Vaccine  Discontinued    Prostate Specific Antigen (PSA) Screening or Monitoring  Discontinued             (applicable per patient's age: Cancer Screenings, Depression Screening, Fall Risk Screening, Immunizations)    Hemoglobin A1C (%)   Date Value   03/08/2023 6.2   03/24/2022 6.0   10/02/2017 5.8     LDL Cholesterol (mg/dL)   Date Value   02/21/2023 71     LDL Calculated (mg/dL)   Date Value   10/02/2017 51     AST (U/L)   Date Value   02/21/2023 30     ALT (U/L)   Date Value   02/21/2023 40     BUN (mg/dL)   Date Value   03/02/2023 18      (goal A1C is < 7)   (goal LDL is <100) need 30-50% reduction from baseline     BP Readings from Last 3 Encounters:   03/08/23 114/70   03/02/23 96/70   04/05/22 123/85    (goal /80)      All Future Testing planned in [facility]:  Lab Frequency Next Occurrence   XR ABDOMEN (KUB) (SINGLE AP VIEW) Once 03/02/2024   PSA Screening Once 15/77/9335   Basic Metabolic Panel Once 88/37/0484       Next Visit Date:  Future Appointments   Date Time Provider Ebonie Beverly   5/3/2023  1:45 PM SCHEDULE, [facility] PT SCHEDULE [facility]   9/8/2023  1:40 PM DARLEEN Justin - [facility] Tiff needs device and assist

## 2023-11-06 NOTE — H&P ADULT - NEGATIVE HEMATOLOGY SYMPTOMS
no gum bleeding/no nose bleeding Drysol Pregnancy And Lactation Text: This medication is considered safe during pregnancy and breast feeding.

## 2023-12-14 NOTE — ED PROVIDER NOTE - CCCP TRG CHIEF CMPLNT
Subjective:     Flo Montoya is a 12 m.o. male who is brought in for Well Child (With mother for tonny. )    History was provided by the mother.    Medical history is significant for the following:   Active Ambulatory Problems     Diagnosis Date Noted    No Active Ambulatory Problems     Resolved Ambulatory Problems     Diagnosis Date Noted    No Resolved Ambulatory Problems     No Additional Past Medical History        Since the last visit there have been no significant history changes, ER visits or admissions.     Current Issues:  Current concerns include none    Review of Nutrition:  Current diet: eats well, weaning from the breast and doing pea milk. Water and no juices.   Difficulties with feeding? no  Water system: [place]  Fluoride: yes  Sleep: nursing at night.    Social Screening:  Current child-care arrangements: none  Parental coping and self-care: doing well; no concerns  Secondhand smoke exposure? no    Screening Questions:  Risk factors for lead toxicity: no  Risk factors for tuberculosis: no  Risk factors for anemia: no    Developmental Milestones:  Pulls to stand:Yes  Free stands:Yes  Cruising:Yes  Taking steps:Yes  Pat-a-cake:Yes  Peek-a-collins:Yes  Says 2-4 words:Yes  Waves Bye-bye:Yes  Feeds self:Yes     Anticipatory Guidance:  The following Anticipatory guidance was discussed at this visit:  Nutrition/Diet: Yes  Safety: Yes  Environment: Yes  Dental/Oral Care: Yes  Discipline/Parenting: Yes  TV/Screen Time: Yes (No screen time before 2 years old, < 2 hours a day > 2 y and No TV at bedtime.)   Encourage reading daily before bedtime.     Growth parameters: Noted and is normal weight for age.    Review of Systems   Constitutional:  Negative for appetite change, fever and irritability.   HENT:  Negative for nasal congestion, ear pain and rhinorrhea.    Respiratory:  Negative for cough and wheezing.    Gastrointestinal:  Negative for abdominal pain, constipation, diarrhea and vomiting.  "  Integumentary:  Negative for rash.   Neurological:  Negative for headaches.   Psychiatric/Behavioral:  Negative for sleep disturbance.        Objective:     Temp 98.7 °F (37.1 °C)   Ht 2' 6" (0.762 m)   Wt 9.894 kg (21 lb 13 oz)   HC 47.5 cm (18.7")   BMI 17.04 kg/m²     General:   in no apparent distress and well developed and well nourished   Skin:   normal   Head:   normal fontanelles   Eyes:   pupils equal, round, and reactive to light, extraocular movements intact, positive red reflex   Ears:   normal bilaterally   Mouth:   No perioral or gingival cyanosis or lesions.  Tongue is normal in appearance.   Lungs:   clear to auscultation bilaterally   Heart:   regular rate and rhythm, S1, S2 normal, no murmur, click, rub or gallop   Abdomen:   soft, non-tender; bowel sounds normal; no masses,  no organomegaly   Screening DDH:   Ortolani's and Valencia's signs absent bilaterally, leg length symmetrical, and thigh & gluteal folds symmetrical   :   normal male - testes descended bilaterally and circumcised   Femoral pulses:   present bilaterally   Extremities:   extremities normal, atraumatic, no cyanosis or edema   Neuro:   alert, gait normal     No results found for: "HGB", "PBVENB"    Assessment:     Healthy 12 m.o. male infantKain Rossi was seen today for well child.    Diagnoses and all orders for this visit:    Encounter for well child check without abnormal findings    Need for vaccination  -     Hepatitis A vaccine pediatric / adolescent 2 dose IM  -     MMR vaccine subcutaneous  -     Varicella vaccine subcutaneous    Plan:     1. Anticipatory guidance discussed.  Gave handout on well-child issues at this age.  Specific topics reviewed: car seat issues, including proper placement and transition to toddler seat at 20 pounds, importance of varied diet, wean to cup at 9-12 months of age, and whole milk until 2 years old then taper to low-fat or skim.    2. Development:appropriate for age    3. Immunizations " today: MMR, VZV, Hep A. Indications and possible side effects discussed. Counseled x 5 components.    4. Ibuprofen every 6 hours as needed for fever or pain.     5. Mom declined cbc today.     Follow up in 3 months for check up or sooner as needed.     Symptomatic treatments and expected course for diagnosis were discussed and appropriate handouts were given including specific follow-up instructions.      Jaqueline Vasquez MD     fever/fall

## 2024-03-31 NOTE — CONSULT NOTE ADULT - ASSESSMENT
Pt was able to complete dialysis yesterday. 1 episode of dark stool this morning. Increasing fatigue and body aches. No fever. No congestion.   89 yo male with hx of chronic hsieh, urology called for difficult hsieh placement after failed attempts in the ER.   18 Fr. coude catheter was placed in a sterile fashion with clear very light pink tinge urine return, PVR 20 cc. Hand irrigated hsieh with clear urine return no clots or resistance.   Recommend  - D/C with hsieh to leg bag  - Monthly hsieh catheter changes with his nurse or with Dr. Ribeiro in the office     Case discussed with Dr. Ribeiro

## 2024-07-25 NOTE — ED PROVIDER NOTE - SKIN, MLM
Addended by: JOSEPH TILLMAN on: 7/25/2024 10:13 AM     Modules accepted: Orders    
+RUE: 3 cm in diameter superficial skin tear at dorsal aspect of right forearm. No evidence of rash.

## 2025-01-07 NOTE — CONSULT NOTE ADULT - ASSESSMENT
Preoperative cardiac evaluation- Pt underwent stress test last yr which did not reveal any ischemia.  No active cardiac symptoms.  He is a moderate risk pt for a moderate risk procedure with poor functional tolerance.  he can proceed with his surgery without any further cardiac testing.    Atrial fibrillaltion- hold couamdin in an ticipation of the surgery and restart as soon as ok by ortho team postop.  Pt and family understands the risk of CVA off the anticoagulaiton.  Continue current meds.    Hyperlipidemia-c ontinue statin.    HTN- continue current meds.    Other medical issues- Management per primary team.   Thank you for allowing me to participate in the care of this patient. Please feel free to contact me with any questions.
A/P: 87y Male with R/o L Periprosthetic Hip Fracture   Xrays are not definitive for acute dom prosthetic fracture, chronic appearing prosthesis movement into varus with new bone formation laterally  Will obtain CT scan with Hardware protocol to determine if acute fracture is present  Initial imaging reviewd  Pain control  NWB LLE   Antiinflammatories as tolerated  DVT PE ppx  Will d/w attending and advise if plan changes, CT scan will determine further treatment
no...

## 2025-03-26 NOTE — DISCHARGE NOTE NURSING/CASE MANAGEMENT/SOCIAL WORK - NSDCPEPTCOWADR_GEN_ALL_CORE
Coumadin/Warfarin increases the risk for bleeding. Notify your doctor if you see any bleeding or any of the side effects listed in the Coumadin/Warfarin Booklet. Diet and medications can affect the PT/INR blood level. When Coumadin/Warfarin is taken with other medicines it can change the way other medicines work. Other medicines can also change the way Coumadin/Warfarin works. It is very important to tell your healthcare provider about all of the other medicines, including over-the-counter medications, herbs, diet supplements, or products containing Vitamin K. Call your doctor before starting, stopping, or changing the dose of any prescription or over-the-counter medications.    Any product containing Aspirin lessens the blood's ability to form clots and adds to the effect of Coumadin/Warfarin.    Never take Aspirin without speaking with your health care provider.
[FreeTextEntry1] : last blood work d/w the pt at length
